# Patient Record
Sex: FEMALE | Race: WHITE | Employment: UNEMPLOYED | URBAN - METROPOLITAN AREA
[De-identification: names, ages, dates, MRNs, and addresses within clinical notes are randomized per-mention and may not be internally consistent; named-entity substitution may affect disease eponyms.]

---

## 2017-10-17 ENCOUNTER — ED HISTORICAL/CONVERTED ENCOUNTER (OUTPATIENT)
Dept: OTHER | Age: 42
End: 2017-10-17

## 2017-12-13 ENCOUNTER — ED HISTORICAL/CONVERTED ENCOUNTER (OUTPATIENT)
Dept: OTHER | Age: 42
End: 2017-12-13

## 2019-09-30 ENCOUNTER — ED HISTORICAL/CONVERTED ENCOUNTER (OUTPATIENT)
Dept: OTHER | Age: 44
End: 2019-09-30

## 2020-03-10 ENCOUNTER — ED HISTORICAL/CONVERTED ENCOUNTER (OUTPATIENT)
Dept: OTHER | Age: 45
End: 2020-03-10

## 2020-07-30 PROBLEM — R92.8 ABNORMAL MAMMOGRAM: Status: ACTIVE | Noted: 2020-07-30

## 2020-07-30 RX ORDER — SUMATRIPTAN 50 MG/1
50 TABLET, FILM COATED ORAL
COMMUNITY

## 2020-07-30 RX ORDER — ALPRAZOLAM 1 MG/1
0.5 TABLET ORAL
COMMUNITY

## 2020-07-30 RX ORDER — NAPROXEN 500 MG/1
500 TABLET ORAL 2 TIMES DAILY WITH MEALS
COMMUNITY
End: 2020-10-10

## 2020-07-31 ENCOUNTER — OFFICE VISIT (OUTPATIENT)
Dept: SURGERY | Age: 45
End: 2020-07-31
Payer: COMMERCIAL

## 2020-07-31 VITALS
SYSTOLIC BLOOD PRESSURE: 120 MMHG | TEMPERATURE: 98.4 F | HEART RATE: 59 BPM | DIASTOLIC BLOOD PRESSURE: 72 MMHG | BODY MASS INDEX: 26.97 KG/M2 | HEIGHT: 67 IN | WEIGHT: 171.8 LBS

## 2020-07-31 DIAGNOSIS — R92.8 ABNORMAL MAMMOGRAPHY: ICD-10-CM

## 2020-07-31 DIAGNOSIS — Z80.3 FAMILY HISTORY OF BREAST CANCER: ICD-10-CM

## 2020-07-31 DIAGNOSIS — F17.200 TOBACCO DEPENDENCE: ICD-10-CM

## 2020-07-31 DIAGNOSIS — N64.4 BREAST PAIN: Primary | ICD-10-CM

## 2020-07-31 PROCEDURE — 99204 OFFICE O/P NEW MOD 45 MIN: CPT | Performed by: SURGERY

## 2020-07-31 RX ORDER — IBUPROFEN 200 MG
1 TABLET ORAL EVERY 24 HOURS
Qty: 30 PATCH | Refills: 0 | Status: SHIPPED | OUTPATIENT
Start: 2020-07-31 | End: 2020-08-30

## 2020-07-31 RX ORDER — PREGABALIN 75 MG/1
CAPSULE ORAL
COMMUNITY
Start: 2020-06-18 | End: 2022-01-06

## 2020-07-31 RX ORDER — DICLOFENAC SODIUM 10 MG/G
GEL TOPICAL 4 TIMES DAILY
Qty: 50 G | Refills: 1 | Status: SHIPPED | OUTPATIENT
Start: 2020-07-31 | End: 2020-10-10

## 2020-07-31 NOTE — PROGRESS NOTES
1. Have you been to the ER, urgent care clinic since your last visit? Hospitalized since your last visit? No    2. Have you seen or consulted any other health care providers outside of the [address] [place] [address] since your last visit? Include any pap smears or colon screening. No       Patient is here for 2nd opinion for abnormal mammo. Patient has no other complaints.

## 2020-08-02 NOTE — PROGRESS NOTES
This is the first [address] [place] [address] visit for this 45y.o.-year-old woman who presents with left abnormal mammogram      HISTORY OF PRESENT ILLNESS: Ms Cayetano Goldman is a 45y.o.-year-old woman who presents for left abnormal mammogra. She denies any palpable masses, nipple discharge, skin changes, or axillary adenopathy. She denies breast pain. She does not have any significant past history for breast diseases or breast biopsy. Breast cancer risk factors:  Menarche at age 19    Age at first live birth: 27  premenopausal.  OCP use: admits to  HRT use: denies  Infertility treatment:  denies    FAMILY HISTORY:  Maternal aunt w breast cancer at 52yo  Maternal great aunt w breast cancer at 70yo  Maternal grandfather w stomach cancer at 68 (no relation to the great aunt)    Past Medical History:   Diagnosis Date    Anxiety     Diabetes ([address]     gestational    Fibromyalgia     Headache     migraines         Past Surgical History:   Procedure Laterality Date    HX CHOLECYSTECTOMY      HX GYN      hysterectomy    HX ORTHOPAEDIC      toe    HX TUBAL LIGATION           Social History     Socioeconomic History    Marital status: UNKNOWN     Spouse name: Not on file    Number of children: Not on file    Years of education: Not on file    Highest education level: Not on file   Tobacco Use    Smoking status: Current Every Day Smoker     Packs/day: 0.50     Years: 10.00     Pack years: 5.00    Smokeless tobacco: Never Used   Substance and Sexual Activity    Alcohol use: Yes     Comment: Rarely    Drug use: Not Currently         Allergies   Allergen Reactions    Sulfa (Sulfonamide Antibiotics) Unable to Obtain    Vancomycin Other (comments)     Red streaks at injection site.      Zoloft [Sertraline] Unable to Obtain         Current Outpatient Medications   Medication Sig Dispense Refill    pregabalin (LYRICA) 75 mg capsule TK 1 C PO BID      diclofenac (VOLTAREN) 1 % gel Apply to affected area four (4) times daily. 50 g 1    nicotine (NICODERM CQ) 14 mg/24 hr patch 1 Patch by TransDERmal route every twenty-four (24) hours for 30 days. 30 Patch 0    naproxen (NAPROSYN) 500 mg tablet Take 500 mg by mouth two (2) times daily (with meals).  fexofenadine HCl (ALLEGRA PO) Take  by mouth.  SUMAtriptan (IMITREX) 50 mg tablet Take 50 mg by mouth once as needed for Migraine.  ALPRAZolam (Xanax) 1 mg tablet Take  by mouth. REVIEW OF SYSTEMS:  General: normal, no unintentional weight loss  HEENT: normal, no lymphadenopathy  Cardiovascular: normal, no chest pain  Respiratory: no cough, shortness of breath, or wheezing  BREAST: no breast complaints  GI: normal, no blood in urine or stool  : normal, no hematuria  Musculoskeletal: no back pain  Integumentary: no abnormal skin lesions  Neuro: no memory changes, dizziness, loss of consciousness  Psych: no mood disorders, feels safe in a relationship      PHYSICAL EXAM:  Patient is a 45y.o.-year-old woman  General Appearance: healthy-appearing, no acute distress, ambulating normally  Head: normocephalic  Neck: supple and no masses  Lymph Nodes: no cervical LAD, supraclavicular LAD, or axillary LAD    BREASTS: symmetric  RIGHT BREAST: no erythema, induration, tenderness, ecchymosis, skin changes, abnormal secretions, or axillary lymphadenopathy and normal nipple appearance, no masses  LEFT BREAST: no erythema, induration, tenderness, ecchymosis, skin changes, abnormal secretions, or axillary lymphadenopathy and normal nipple appearance, no masses    Lungs: no dyspnea  Back: normal curvature  Abdomen: soft and no tenderness, no hepatomegaly, no splenomegaly  Skin: no jaundice  Musculoskeletal: Extremities w no edema, normal motor strength, normal movement of all extremities  Neurologic: Cranial Nerves: grossly intact.  Sensation: grossly intact  Psychiatric: good judgement and insight, active and alert and normal mood      RADIOLOGIC WORK-UP: Radiology films and reports were reviewed. 7.13.2020 left breast US and dx mmg: probably benign [id number] mass at the 3:00 position 8cm FN. Likely fibroadenoma. I performed an ultrasound in the office that demonstrated the left breast mass without suspicious characteristics      IMPRESSION:  45y.o.-year old woman with left abnormal mammogram, likely fibroadenoma    DISCUSSION AND PLAN:  I discussed the results of the physical examination and imaging with Ms. Criss Newsome and explained that the findings are probably benign. We would recommend a short term imaging and physical examination follow up to demonstrate stability of the findings. No other additional diagnostic or therapeutic intervention is indicated at this time. I will place an order for her 3-month follow-up imaging and she will return to clinic for an exam after her imaging is performed. I asked Ms Criss Newsome about tobacco use: she is a current smoker; I explained and advised that she quit; she is willing to quit. Patient will continue to attempt to quit. Advised her to use nicotine patches. The patient was counseled on continued smoking cessation. We will arrange follow-up of her tobacco us. I advised her to return to clinic earlier if she notes a change in her exam or she has a new finding. This encounter lasted 45 minutes and > 50% was devoted to a discussion of management options.

## 2020-08-17 ENCOUNTER — ED HISTORICAL/CONVERTED ENCOUNTER (OUTPATIENT)
Dept: OTHER | Age: 45
End: 2020-08-17

## 2020-10-05 ENCOUNTER — HOSPITAL ENCOUNTER (OUTPATIENT)
Dept: NON INVASIVE DIAGNOSTICS | Age: 45
Discharge: HOME | End: 2020-10-05
Attending: PSYCHIATRY & NEUROLOGY
Payer: COMMERCIAL

## 2020-10-05 VITALS
HEIGHT: 67 IN | BODY MASS INDEX: 26.68 KG/M2 | SYSTOLIC BLOOD PRESSURE: 122 MMHG | WEIGHT: 170 LBS | DIASTOLIC BLOOD PRESSURE: 71 MMHG

## 2020-10-05 DIAGNOSIS — R42 DIZZINESS: ICD-10-CM

## 2020-10-05 PROCEDURE — 93880 EXTRACRANIAL BILAT STUDY: CPT

## 2020-10-05 PROCEDURE — 93306 TTE W/DOPPLER COMPLETE: CPT

## 2020-10-06 LAB
ECHO AO ROOT DIAM: 3.1 CM
ECHO AV AREA PEAK VELOCITY: 3.67 CM2
ECHO AV AREA/BSA PEAK VELOCITY: 1.9 CM2/M2
ECHO AV PEAK GRADIENT: 5 MMHG
ECHO EST RA PRESSURE: 3 MMHG
ECHO LV E' SEPTAL VELOCITY: 12.4 CM/S
ECHO LV EJECTION FRACTION A2C: 35 %
ECHO LV EJECTION FRACTION BIPLANE: 63.9 % (ref 55–100)
ECHO LV EJECTION FRACTION BIPLANE: 71.1 % (ref 55–100)
ECHO LV INTERNAL DIMENSION DIASTOLIC: 4.24 CM (ref 3.9–5.3)
ECHO LV INTERNAL DIMENSION SYSTOLIC: 2.8 CM
ECHO LV IVSD: 0.85 CM (ref 0.6–0.9)
ECHO LV POSTERIOR WALL DIASTOLIC: 0.88 CM (ref 0.6–0.9)
ECHO LV POSTERIOR WALL DIASTOLIC: 0.89 CM (ref 0.6–0.9)
ECHO LVOT DIAM: 2 CM
ECHO LVOT PEAK GRADIENT: 7 MMHG
ECHO MV A VELOCITY: 40.7 CM/S
ECHO MV AREA PHT: 1.45 CM2
ECHO MV E DECELERATION TIME (DT): 0.23 S
ECHO MV E VELOCITY: 64.2 CM/S
ECHO MV E/A RATIO: 1.58
ECHO MV E/E' SEPTAL: 5.18
ECHO MV PRESSURE HALF TIME (PHT): 0.15 S
ECHO PV REGURGITANT MAX VELOCITY: 112 CM/S
ECHO PV REGURGITANT MAX VELOCITY: 131 CM/S
ECHO PVEIN A DURATION: 0.07 S
ECHO PVEIN A VELOCITY: 22.5 CM/S
ECHO RIGHT VENTRICULAR SYSTOLIC PRESSURE (RVSP): 16 MMHG
ECHO RV INTERNAL DIMENSION: 2.24 CM
ECHO TV MAX VELOCITY: 179 CM/S
ECHO TV REGURGITANT PEAK GRADIENT: 13 MMHG
LEFT CCA DIST DIAS: 33.6 CM/S
LEFT CCA DIST SYS: 117 CM/S
LEFT CCA PROX DIAS: 22.2 CM/S
LEFT CCA PROX SYS: 105 CM/S
LEFT ECA DIAS: 13 CM/S
LEFT ECA SYS: 108 CM/S
LEFT ICA DIST DIAS: 37.4 CM/S
LEFT ICA DIST SYS: 93.2 CM/S
LEFT ICA PROX DIAS: 35.1 CM/S
LEFT ICA PROX SYS: 96.2 CM/S
LEFT SUBCLAVIAN DIAS: 37.4 CM/S
LEFT SUBCLAVIAN SYS: 134 CM/S
LEFT VERTEBRAL DIAS: 19.1 CM/S
LEFT VERTEBRAL SYS: 67.2 CM/S
RIGHT CCA DIST DIAS: 27.5 CM/S
RIGHT CCA DIST SYS: 112 CM/S
RIGHT CCA PROX DIAS: 28.4 CM/S
RIGHT CCA PROX SYS: 123 CM/S
RIGHT ECA DIAS: 15.5 CM/S
RIGHT ECA SYS: 102 CM/S
RIGHT ICA DIST DIAS: 35.9 CM/S
RIGHT ICA DIST SYS: 109 CM/S
RIGHT ICA PROX DIAS: 35.1 CM/S
RIGHT ICA PROX SYS: 99.3 CM/S
RIGHT SUBCLAVIAN DIAS: 0 CM/S
RIGHT SUBCLAVIAN SYS: 112 CM/S
RIGHT VERTEBRAL DIAS: 10.7 CM/S
RIGHT VERTEBRAL SYS: 45.7 CM/S

## 2020-10-10 ENCOUNTER — HOSPITAL ENCOUNTER (EMERGENCY)
Age: 45
Discharge: HOME | End: 2020-10-10
Attending: EMERGENCY MEDICINE
Payer: COMMERCIAL

## 2020-10-10 ENCOUNTER — APPOINTMENT (OUTPATIENT)
Dept: GENERAL RADIOLOGY | Age: 45
End: 2020-10-10
Attending: EMERGENCY MEDICINE
Payer: COMMERCIAL

## 2020-10-10 VITALS
RESPIRATION RATE: 18 BRPM | WEIGHT: 165 LBS | HEART RATE: 60 BPM | DIASTOLIC BLOOD PRESSURE: 86 MMHG | TEMPERATURE: 98.3 F | SYSTOLIC BLOOD PRESSURE: 129 MMHG | HEIGHT: 67 IN | OXYGEN SATURATION: 100 % | BODY MASS INDEX: 25.9 KG/M2

## 2020-10-10 DIAGNOSIS — S93.401A SPRAIN OF RIGHT ANKLE, UNSPECIFIED LIGAMENT, INITIAL ENCOUNTER: Primary | ICD-10-CM

## 2020-10-10 PROCEDURE — 99283 EMERGENCY DEPT VISIT LOW MDM: CPT

## 2020-10-10 PROCEDURE — 73610 X-RAY EXAM OF ANKLE: CPT

## 2020-10-10 RX ORDER — IBUPROFEN 400 MG/1
400 TABLET ORAL
Qty: 20 TAB | Refills: 0 | Status: SHIPPED | OUTPATIENT
Start: 2020-10-10 | End: 2022-01-06

## 2020-10-10 NOTE — ED TRIAGE NOTES
Walking and turned left ankle out and fell, no other injury  Pt walked to triage limping, states she can move but hurts tender to lateral posterior side.  PMS good,

## 2020-10-10 NOTE — ED PROVIDER NOTES
EMERGENCY DEPARTMENT HISTORY AND PHYSICAL EXAM      Date: 10/10/2020  Patient Name: Cynthia Crenshaw    History of Presenting Illness     Chief Complaint   Patient presents with    Ankle Pain       History Provided By: Patient    HPI: Cynthia Crenshaw, 45 y.o. female with a past medical history significant Anxiety and DMpresents to the ED with cc of rolled right ankle yesterday    There are no other complaints, changes, or physical findings at this time. PCP: Soumya Woods, DO    No current facility-administered medications on file prior to encounter. Current Outpatient Medications on File Prior to Encounter   Medication Sig Dispense Refill    pregabalin (LYRICA) 75 mg capsule TK 1 C PO BID      [DISCONTINUED] diclofenac (VOLTAREN) 1 % gel Apply  to affected area four (4) times daily. 50 g 1    fexofenadine HCl (ALLEGRA PO) Take  by mouth.  SUMAtriptan (IMITREX) 50 mg tablet Take 50 mg by mouth once as needed for Migraine.  ALPRAZolam (Xanax) 1 mg tablet Take  by mouth.  [DISCONTINUED] naproxen (NAPROSYN) 500 mg tablet Take 500 mg by mouth two (2) times daily (with meals).          Past History     Past Medical History:  Past Medical History:   Diagnosis Date    Anxiety     Diabetes ([address]     gestational    Fibromyalgia     Headache     migraines       Past Surgical History:  Past Surgical History:   Procedure Laterality Date    HX CHOLECYSTECTOMY      HX GYN      hysterectomy    HX ORTHOPAEDIC      toe    HX TUBAL LIGATION         Family History:  Family History   Problem Relation Age of Onset    Lupus Mother     Lupus Sister     Coronary Artery Disease Other     Heart Disease Other         MI    Diabetes Other     Lung Cancer Other     Breast Cancer Maternal Aunt     Stomach Cancer Maternal Grandfather     Prostate Cancer Paternal Grandfather        Social History:  Social History     Tobacco Use    Smoking status: Current Every Day Smoker     Packs/day: 0.25     Years: 10.00     Pack years: 2.50    Smokeless tobacco: Never Used   Substance Use Topics    Alcohol use: Yes     Comment: Rarely    Drug use: Never       Allergies: Allergies   Allergen Reactions    Sulfa (Sulfonamide Antibiotics) Unable to Obtain    Vancomycin Other (comments)     Red streaks at injection site.  Zoloft [Sertraline] Unable to Obtain         Review of Systems     Review of Systems   Constitutional: Negative. HENT: Negative. Eyes: Negative. Respiratory: Negative. Cardiovascular: Negative. Gastrointestinal: Negative. Endocrine: Negative. Genitourinary: Negative. Musculoskeletal: Positive for joint swelling. Twisted right ankle. Painful and swollen   Neurological: Negative. Hematological: Negative. All other systems reviewed and are negative. Physical Exam     Physical Exam  Vitals signs and nursing note reviewed. Constitutional:       General: She is not in acute distress. Appearance: Normal appearance. HENT:      Head: Normocephalic and atraumatic. Neck:      Musculoskeletal: Normal range of motion and neck supple. Cardiovascular:      Rate and Rhythm: Regular rhythm. Pulses: Normal pulses. Heart sounds: Normal heart sounds. Pulmonary:      Effort: Pulmonary effort is normal.      Breath sounds: Normal breath sounds. Musculoskeletal:         General: Swelling and tenderness present. Comments: Right lateral malleolus   Neurological:      General: No focal deficit present. Mental Status: She is alert and oriented to person, place, and time. Diagnostic Study Results     Labs -   No results found for this or any previous visit (from the past 12 hour(s)). Radiologic Studies -   @lastxrresult@  CT Results  (Last 48 hours)    None        CXR Results  (Last 48 hours)    None            Medical Decision Making   I am the first provider for this patient.     I reviewed the vital signs, available nursing notes, past medical history, past surgical history, family history and social history. Vital Signs-Reviewed the patient's vital signs. Patient Vitals for the past 12 hrs:   Temp Pulse Resp BP SpO2   10/10/20 1111 98.3 °F (36.8 °C) 60 18 129/86 100 %       Records Reviewed:           Provider Notes (Medical Decision Making):     MDM  Number of Diagnoses or Management Options  Sprain of right ankle, unspecified ligament, initial encounter:      Amount and/or Complexity of Data Reviewed  Tests in the radiology section of CPT®: reviewed             ED Course:   Initial assessment performed. The patients presenting problems have been discussed, and they are in agreement with the care plan formulated and outlined with them. I have encouraged them to ask questions as they arise throughout their visit. PLAN:  1. Current Discharge Medication List      START taking these medications    Details   ibuprofen (MOTRIN) 400 mg tablet Take 1 Tab by mouth every six (6) hours as needed for Pain. Qty: 20 Tab, Refills: 0           2. Follow-up Information     Follow up With Specialties Details Why [address] [facility], [facility], [address] In 3 days  [address] [facility] [address]  [phone]          Return to ED if worse     Diagnosis     Clinical Impression:   1.  Sprain of right ankle, unspecified ligament, initial encounter

## 2021-06-28 ENCOUNTER — HOSPITAL ENCOUNTER (EMERGENCY)
Age: 46
Discharge: HOME | End: 2021-06-28
Attending: EMERGENCY MEDICINE
Payer: COMMERCIAL

## 2021-06-28 ENCOUNTER — APPOINTMENT (OUTPATIENT)
Dept: CT IMAGING | Age: 46
End: 2021-06-28
Attending: EMERGENCY MEDICINE
Payer: COMMERCIAL

## 2021-06-28 VITALS
DIASTOLIC BLOOD PRESSURE: 82 MMHG | RESPIRATION RATE: 18 BRPM | HEIGHT: 67 IN | HEART RATE: 73 BPM | SYSTOLIC BLOOD PRESSURE: 124 MMHG | BODY MASS INDEX: 27.15 KG/M2 | TEMPERATURE: 99.3 F | WEIGHT: 173 LBS | OXYGEN SATURATION: 97 %

## 2021-06-28 DIAGNOSIS — R10.84 ABDOMINAL PAIN, GENERALIZED: Primary | ICD-10-CM

## 2021-06-28 LAB
ALBUMIN SERPL-MCNC: 3.6 G/DL (ref 3.5–5)
ALBUMIN/GLOB SERPL: 0.9 {RATIO} (ref 1.1–2.2)
ALP SERPL-CCNC: 80 U/L (ref 45–117)
ALT SERPL-CCNC: 36 U/L (ref 12–78)
ANION GAP SERPL CALC-SCNC: 5 MMOL/L (ref 5–15)
APPEARANCE UR: CLEAR
AST SERPL W P-5'-P-CCNC: 22 U/L (ref 15–37)
BACTERIA URNS QL MICRO: NEGATIVE /HPF
BASOPHILS # BLD: 0.1 K/UL (ref 0–0.1)
BASOPHILS NFR BLD: 0 % (ref 0–1)
BILIRUB SERPL-MCNC: 0.2 MG/DL (ref 0.2–1)
BILIRUB UR QL: NEGATIVE
BUN SERPL-MCNC: 11 MG/DL (ref 6–20)
BUN/CREAT SERPL: 15 (ref 12–20)
CA-I BLD-MCNC: 9.1 MG/DL (ref 8.5–10.1)
CHLORIDE SERPL-SCNC: 108 MMOL/L (ref 97–108)
CO2 SERPL-SCNC: 26 MMOL/L (ref 21–32)
COLOR UR: NORMAL
CREAT SERPL-MCNC: 0.75 MG/DL (ref 0.55–1.02)
DIFFERENTIAL METHOD BLD: ABNORMAL
EOSINOPHIL # BLD: 0.4 K/UL (ref 0–0.4)
EOSINOPHIL NFR BLD: 3 % (ref 0–7)
ERYTHROCYTE [DISTWIDTH] IN BLOOD BY AUTOMATED COUNT: 12.9 % (ref 11.5–14.5)
GLOBULIN SER CALC-MCNC: 3.8 G/DL (ref 2–4)
GLUCOSE SERPL-MCNC: 134 MG/DL (ref 65–100)
GLUCOSE UR STRIP.AUTO-MCNC: NEGATIVE MG/DL
HCG SERPL QL: NEGATIVE
HCT VFR BLD AUTO: 44 % (ref 35–47)
HGB BLD-MCNC: 14.7 G/DL (ref 11.5–16)
HGB UR QL STRIP: NEGATIVE
IMM GRANULOCYTES # BLD AUTO: 0 K/UL (ref 0–0.04)
IMM GRANULOCYTES NFR BLD AUTO: 0 % (ref 0–0.5)
KETONES UR QL STRIP.AUTO: NEGATIVE MG/DL
LEUKOCYTE ESTERASE UR QL STRIP.AUTO: NEGATIVE
LIPASE SERPL-CCNC: 168 U/L (ref 73–393)
LYMPHOCYTES # BLD: 3.6 K/UL (ref 0.8–3.5)
LYMPHOCYTES NFR BLD: 27 % (ref 12–49)
MCH RBC QN AUTO: 30.3 PG (ref 26–34)
MCHC RBC AUTO-ENTMCNC: 33.4 G/DL (ref 30–36.5)
MCV RBC AUTO: 90.7 FL (ref 80–99)
MONOCYTES # BLD: 0.8 K/UL (ref 0–1)
MONOCYTES NFR BLD: 6 % (ref 5–13)
NEUTS SEG # BLD: 8.7 K/UL (ref 1.8–8)
NEUTS SEG NFR BLD: 64 % (ref 32–75)
NITRITE UR QL STRIP.AUTO: NEGATIVE
NRBC # BLD: 0 K/UL (ref 0–0.01)
NRBC BLD-RTO: 0 PER 100 WBC
PH UR STRIP: 6 [PH] (ref 5–8)
PLATELET # BLD AUTO: 286 K/UL (ref 150–400)
PMV BLD AUTO: 12.2 FL (ref 8.9–12.9)
POTASSIUM SERPL-SCNC: 3.7 MMOL/L (ref 3.5–5.1)
PROT SERPL-MCNC: 7.4 G/DL (ref 6.4–8.2)
PROT UR STRIP-MCNC: NEGATIVE MG/DL
RBC # BLD AUTO: 4.85 M/UL (ref 3.8–5.2)
RBC #/AREA URNS HPF: NORMAL /HPF (ref 0–5)
SODIUM SERPL-SCNC: 139 MMOL/L (ref 136–145)
SP GR UR REFRACTOMETRY: 1.01 (ref 1–1.03)
UA: UC IF INDICATED,UAUC: NORMAL
UROBILINOGEN UR QL STRIP.AUTO: 0.1 EU/DL (ref 0.1–1)
WBC # BLD AUTO: 13.5 K/UL (ref 3.6–11)
WBC URNS QL MICRO: NORMAL /HPF (ref 0–4)

## 2021-06-28 PROCEDURE — 83690 ASSAY OF LIPASE: CPT

## 2021-06-28 PROCEDURE — 99283 EMERGENCY DEPT VISIT LOW MDM: CPT

## 2021-06-28 PROCEDURE — 84703 CHORIONIC GONADOTROPIN ASSAY: CPT

## 2021-06-28 PROCEDURE — 80053 COMPREHEN METABOLIC PANEL: CPT

## 2021-06-28 PROCEDURE — 93005 ELECTROCARDIOGRAM TRACING: CPT

## 2021-06-28 PROCEDURE — 74011000636 HC RX REV CODE- 636: Performed by: EMERGENCY MEDICINE

## 2021-06-28 PROCEDURE — 81001 URINALYSIS AUTO W/SCOPE: CPT

## 2021-06-28 PROCEDURE — 36415 COLL VENOUS BLD VENIPUNCTURE: CPT

## 2021-06-28 PROCEDURE — 85025 COMPLETE CBC W/AUTO DIFF WBC: CPT

## 2021-06-28 PROCEDURE — 74177 CT ABD & PELVIS W/CONTRAST: CPT

## 2021-06-28 RX ORDER — ALUMINA, MAGNESIA, AND SIMETHICONE 2400; 2400; 240 MG/30ML; MG/30ML; MG/30ML
10 SUSPENSION ORAL
Qty: 335 ML | Refills: 0 | Status: SHIPPED | OUTPATIENT
Start: 2021-06-28 | End: 2022-01-06

## 2021-06-28 RX ADMIN — IOPAMIDOL 100 ML: 755 INJECTION, SOLUTION INTRAVENOUS at 19:22

## 2021-06-28 NOTE — ED PROVIDER NOTES
EMERGENCY DEPARTMENT HISTORY AND PHYSICAL EXAM      Date: 6/28/2021  Patient Name: Yoshi Almanzar    History of Presenting Illness     Chief Complaint   Patient presents with    Nausea    Abdominal Pain       History Provided By: Patient    HPI: Yoshi Almanzar, 39 y.o. female with PMHx as noted below presents the emergency department chief complaint abdominal pain, nausea vomiting. Patient stated onset of pain was earlier today. Patient states originally pain was in her epigastrium and describes it as a dull pain. She states the pain is radiating to her right lower quadrant and right upper quadrant. Pain is moderate in intensity and she does have some associated nausea. Pt denies any other alleviating or exacerbating factors. Additionally, pt specifically denies any recent fever, chills, headache, vomiting,CP, SOB, lightheadedness, dizziness, numbness, weakness, BLE swelling, heart palpitations, urinary sxs, diarrhea, constipation, melena, hematochezia, cough, or congestion. PCP: Gaetano Sofia, DO    Current Outpatient Medications   Medication Sig Dispense Refill    aluminum & magnesium hydroxide-simethicone (Maalox Maximum Strength) 400-400-40 mg/5 mL suspension Take 10 mL by mouth every six (6) hours as needed for Indigestion. 335 mL 0    ibuprofen (MOTRIN) 400 mg tablet Take 1 Tab by mouth every six (6) hours as needed for Pain. 20 Tab 0    pregabalin (LYRICA) 75 mg capsule TK 1 C PO BID      fexofenadine HCl (ALLEGRA PO) Take  by mouth.  SUMAtriptan (IMITREX) 50 mg tablet Take 50 mg by mouth once as needed for Migraine.  ALPRAZolam (Xanax) 1 mg tablet Take  by mouth.          Past History     Past Medical History:  Past Medical History:   Diagnosis Date    Anxiety     Diabetes ([facility] [address]     gestational    Fibromyalgia     Headache     migraines       Past Surgical History:  Past Surgical History:   Procedure Laterality Date    HX CHOLECYSTECTOMY      HX GYN      hysterectomy    HX ORTHOPAEDIC      toe    HX TUBAL LIGATION         Family History:  Family History   Problem Relation Age of Onset    Lupus Mother     Lupus Sister     Coronary Artery Disease Other     Heart Disease Other         MI    Diabetes Other     Lung Cancer Other     Breast Cancer Maternal Aunt     Stomach Cancer Maternal Grandfather     Prostate Cancer Paternal Grandfather        Social History:  Social History     Tobacco Use    Smoking status: Current Every Day Smoker     Packs/day: 0.25     Years: 10.00     Pack years: 2.50    Smokeless tobacco: Never Used   Substance Use Topics    Alcohol use: Yes     Comment: Rarely    Drug use: Never       Allergies: Allergies   Allergen Reactions    Sulfa (Sulfonamide Antibiotics) Unable to Obtain    Vancomycin Other (comments)     Red streaks at injection site.  Zoloft [Sertraline] Unable to Obtain         Review of Systems   Review of Systems  Constitutional: Negative for fever, chills, and fatigue. HENT: Negative for congestion, sore throat, rhinorrhea, sneezing and neck stiffness   Eyes: Negative for discharge and redness. Respiratory: Negative for  shortness of breath, wheezing   Cardiovascular: Negative for chest pain, palpitations   Gastrointestinal: Positive for nausea,  abdominal pain. Negative vomiting constipation, diarrhea and blood in stool. Genitourinary: Negative for dysuria, hematuria, flank pain, decreased urine volume, discharge,   Musculoskeletal: Negative for myalgias or joint pain . Skin: Negative for rash or lesions . Neurological: Negative weakness, light-headedness, numbness and headaches. Physical Exam   Physical Exam    GENERAL: alert and oriented, no acute distress  EYES: PEERL, No injection, discharge or icterus. ENT: Mucous membranes pink and moist.  NECK: Supple  LUNGS: Airway patent. Non-labored respirations. Breath sounds clear with good air entry bilaterally. HEART: Regular rate and rhythm.  No peripheral edema  ABDOMEN: Non-distended, mild diffusely tender, worse in the epigastrium and right lower quadrant without guarding or rebound. SKIN:  warm, dry  MSK/EXTREMITIES: Without swelling, tenderness or deformity, symmetric with normal ROM  NEUROLOGICAL: Alert, oriented      Diagnostic Study Results     Labs -     Recent Results (from the past 12 hour(s))   CBC WITH AUTOMATED DIFF    Collection Time: 06/28/21  5:45 PM   Result Value Ref Range    WBC 13.5 (H) 3.6 - 11.0 K/uL    RBC 4.85 3.80 - 5.20 M/uL    HGB 14.7 11.5 - 16.0 g/dL    HCT 44.0 35.0 - 47.0 %    MCV 90.7 80.0 - 99.0 FL    MCH 30.3 26.0 - 34.0 PG    MCHC 33.4 30.0 - 36.5 g/dL    RDW 12.9 11.5 - 14.5 %    PLATELET 599 080 - 343 K/uL    MPV 12.2 8.9 - 12.9 FL    NRBC 0.0 0.0  WBC    ABSOLUTE NRBC 0.00 0.00 - 0.01 K/uL    NEUTROPHILS 64 32 - 75 %    LYMPHOCYTES 27 12 - 49 %    MONOCYTES 6 5 - 13 %    EOSINOPHILS 3 0 - 7 %    BASOPHILS 0 0 - 1 %    IMMATURE GRANULOCYTES 0 0 - 0.5 %    ABS. NEUTROPHILS 8.7 (H) 1.8 - 8.0 K/UL    ABS. LYMPHOCYTES 3.6 (H) 0.8 - 3.5 K/UL    ABS. MONOCYTES 0.8 0.0 - 1.0 K/UL    ABS. EOSINOPHILS 0.4 0.0 - 0.4 K/UL    ABS. BASOPHILS 0.1 0.0 - 0.1 K/UL    ABS. IMM. GRANS. 0.0 0.00 - 0.04 K/UL    DF AUTOMATED     METABOLIC PANEL, COMPREHENSIVE    Collection Time: 06/28/21  5:45 PM   Result Value Ref Range    Sodium 139 136 - 145 mmol/L    Potassium 3.7 3.5 - 5.1 mmol/L    Chloride 108 97 - 108 mmol/L    CO2 26 21 - 32 mmol/L    Anion gap 5 5 - 15 mmol/L    Glucose 134 (H) 65 - 100 mg/dL    BUN 11 6 - 20 mg/dL    Creatinine 0.75 0.55 - 1.02 mg/dL    BUN/Creatinine ratio 15 12 - 20      GFR est AA >60 >60 ml/min/1.73m2    GFR est non-AA >60 >60 ml/min/1.73m2    Calcium 9.1 8.5 - 10.1 mg/dL    Bilirubin, total 0.2 0.2 - 1.0 mg/dL    AST (SGOT) 22 15 - 37 U/L    ALT (SGPT) 36 12 - 78 U/L    Alk.  phosphatase 80 45 - 117 U/L    Protein, total 7.4 6.4 - 8.2 g/dL    Albumin 3.6 3.5 - 5.0 g/dL    Globulin 3.8 2.0 - 4.0 g/dL    A-G Ratio 0.9 (L) 1.1 - 2.2     URINALYSIS W/ REFLEX CULTURE    Collection Time: 06/28/21  5:45 PM    Specimen: Urine   Result Value Ref Range    Color Yellow/Straw      Appearance Clear Clear      Specific gravity 1.014 1.003 - 1.030      pH (UA) 6.0 5.0 - 8.0      Protein Negative Negative mg/dL    Glucose Negative Negative mg/dL    Ketone Negative Negative mg/dL    Bilirubin Negative Negative      Blood Negative Negative      Urobilinogen 0.1 0.1 - 1.0 EU/dL    Nitrites Negative Negative      Leukocyte Esterase Negative Negative      UA:UC IF INDICATED Culture not indicated by UA result Culture not indicated by UA result      WBC 0-4 0 - 4 /hpf    RBC 0-5 0 - 5 /hpf    Bacteria Negative Negative /hpf   HCG QL SERUM    Collection Time: 06/28/21  5:45 PM   Result Value Ref Range    HCG, Ql. Negative Negative     LIPASE    Collection Time: 06/28/21  5:45 PM   Result Value Ref Range    Lipase 168 73 - 393 U/L       Radiologic Studies -   CT ABD PELV W CONT   Final Result      No acute process. Cholecystectomy. Hysterectomy. Small left ovarian cyst, within physiologic   limits. Mild diverticulosis. No diverticulitis. CT Results  (Last 48 hours)               06/28/21 1921  CT ABD PELV W CONT Final result    Impression:      No acute process. Cholecystectomy. Hysterectomy. Small left ovarian cyst, within physiologic   limits. Mild diverticulosis. No diverticulitis. Narrative:  Technique:        Axial images with multiplanar reformats of abdomen and pelvis with 100cc Isoview   370   intravenous contrast injection. Oral contrast is not used for this study. Dose Reduction:  All CT scans at this facility are performed using dose   reduction optimization techniques as appropriate to a performed exam including   the following: Automated exposure control, adjustments of the mA and/or kV   according to patient size, or use of iterative reconstruction technique. Comparison:  No priors available. Findings:        Clear lung bases. There is a small calcification involving hepatic dome, most   likely representing small calcified granuloma. Cholecystectomy. Bile ducts are   not dilated. No suspicious liver lesion. Unremarkable spleen, pancreas,   bilateral adrenal glands. Unremarkable bilateral kidneys. No hydronephrosis. No   definite renal or ureteral calculus seen. There is mild diverticulosis involving   left colon and sigmoid colon. No diverticulitis. Hysterectomy. Unremarkable   right adnexa. Left adnexal 2 cm cyst present, within physiologic limits. No   definite mass or lymphadenopathy seen. No ascites. There is no free   intraperitoneal air present. No suspicious osseous lesions. CXR Results  (Last 48 hours)    None            Medical Decision Making     IHollis MD am the first provider for this patient and am the attending of record for this patient encounter. I reviewed the vital signs, available nursing notes, past medical history, past surgical history, family history and social history. Vital Signs-Reviewed the patient's vital signs. Patient Vitals for the past 12 hrs:   Temp Pulse Resp BP SpO2   06/28/21 1700 99.3 °F (37.4 °C) 73 18 124/82 97 %         Records Reviewed: Nursing Notes and Old Medical Records    Provider Notes (Medical Decision Making): The patient presents with a chief complaint of abdominal pain. Differential diagnosis considered includes acute appendicitis, acute cholecystitis, pancreatitis, gastritis, PUD, diverticulitis, mesenteric ischemia, abdominal aortic aneurysm, bowel obstruction, enteritis, colitis, fecal impaction, volvulus, IBS, inflammatory bowel disease, specific food intolerance, peritonitis, perforated viscous, malignancy, UTI, abscess, and abdominal pain NOS. Pelvic source of pain was also considered.   All labs and diagnostic studies were reviewed as above and reassuring, nondiagnostic clinical examination, history, and work-up exclude some of the more serious diagnoses as listed above. Cause of the abdominal pain was not clearly identified. Pt is tolerating po. The patient states that their symptoms are relatively mild, was offered pain medication however she declined. There are no other new complaints at this time      There were no concerns for any acute life-threatening or surgical pathology that would warrant admission at this time. Vital signs were within acceptable limits at the time of discharge. Patient was in stable condition and felt to be reliable for outpatient management. There were no lab findings of immediate concern. The patient was advised to return to the emergency room for acutely worsening pain, persistence of pain, fevers, bloody stools, intractable vomiting or bloody emesis, inability to tolerate food/liquids, syncope, or change in mental status. Otherwise, the patient is encouraged to follow-up with a primary care physician within the week if possible. .  The patient understood the work-up and assessment and had no further questions. The patient was discharged home in stable clinical condition. ED Course:   Initial assessment performed. The patients presenting problems have been discussed, and they are in agreement with the care plan formulated and outlined with them. I have encouraged them to ask questions as they arise throughout their visit. PROGRESS  Maribel ENIO Weston's  results have been reviewed with her. She has been counseled regarding her diagnosis. She verbally conveys understanding and agreement of the signs, symptoms, diagnosis, treatment and prognosis and additionally agrees to follow up as recommended with Dr. Cathryn Henderson DO in 24 - 48 hours. She also agrees with the care-plan and conveys that all of her questions have been answered.   I have also put together some discharge instructions for her that include: 1) educational information regarding their diagnosis, 2) how to care for their diagnosis at home, as well a 3) list of reasons why they would want to return to the ED prior to their follow-up appointment, should their condition change. Disposition:  home    PLAN:  1. Discharge Medication List as of 6/28/2021  9:45 PM      START taking these medications    Details   aluminum & magnesium hydroxide-simethicone (Maalox Maximum Strength) 400-400-40 mg/5 mL suspension Take 10 mL by mouth every six (6) hours as needed for Indigestion. , Normal, Disp-335 mL, R-0         CONTINUE these medications which have NOT CHANGED    Details   ibuprofen (MOTRIN) 400 mg tablet Take 1 Tab by mouth every six (6) hours as needed for Pain., Normal, Disp-20 Tab,R-0      pregabalin (LYRICA) 75 mg capsule TK 1 C PO BID, Historical Med      fexofenadine HCl (ALLEGRA PO) Take  by mouth., Historical Med      SUMAtriptan (IMITREX) 50 mg tablet Take 50 mg by mouth once as needed for Migraine., Historical Med      ALPRAZolam (Xanax) 1 mg tablet Take  by mouth., Historical Med           2. Follow-up Information     Follow up With Specialties Details Why [address] [place], [place] [facility] Schedule an appointment as soon as possible for a visit in 2 days  [address]  [facility] [address] [facility] EMERGENCY DEPT Emergency Medicine  If symptoms worsen [address] [id number] [phone]        Return to ED if worse     Diagnosis     Clinical Impression:   1. Abdominal pain, generalized        Please note that this dictation was completed with Dragon, computer voice recognition software. Quite often unanticipated grammatical, syntax, homophones, and other interpretive errors are inadvertently transcribed by the computer software. Please disregard these errors. Additionally, please excuse any errors that have escaped final proofreading.

## 2021-06-28 NOTE — ED NOTES
Bedside shift change [facility]  (oncoming nurse) by Avelina Wang RN  (offgoing nurse). Report included the following information SBAR and ED Summary.

## 2021-06-29 LAB
ATRIAL RATE: 76 BPM
CALCULATED P AXIS, ECG09: 74 DEGREES
CALCULATED R AXIS, ECG10: 81 DEGREES
CALCULATED T AXIS, ECG11: 33 DEGREES
DIAGNOSIS, 93000: NORMAL
P-R INTERVAL, ECG05: 142 MS
Q-T INTERVAL, ECG07: 390 MS
QRS DURATION, ECG06: 118 MS
QTC CALCULATION (BEZET), ECG08: 438 MS
VENTRICULAR RATE, ECG03: 76 BPM

## 2021-10-29 ENCOUNTER — TRANSCRIBE ORDER (OUTPATIENT)
Dept: SCHEDULING | Age: 46
End: 2021-10-29

## 2021-10-29 DIAGNOSIS — R18.8 ASCITIC FLUID: Primary | ICD-10-CM

## 2021-10-29 DIAGNOSIS — R10.9 STOMACH ACHE: ICD-10-CM

## 2021-11-03 ENCOUNTER — HOSPITAL ENCOUNTER (OUTPATIENT)
Dept: CT IMAGING | Age: 46
Discharge: HOME | End: 2021-11-03
Attending: INTERNAL MEDICINE
Payer: COMMERCIAL

## 2021-11-03 DIAGNOSIS — R10.9 STOMACH ACHE: ICD-10-CM

## 2021-11-03 DIAGNOSIS — R18.8 ASCITIC FLUID: ICD-10-CM

## 2021-11-03 PROCEDURE — 74011000636 HC RX REV CODE- 636: Performed by: INTERNAL MEDICINE

## 2021-11-03 PROCEDURE — 74177 CT ABD & PELVIS W/CONTRAST: CPT

## 2021-11-03 RX ADMIN — IOPAMIDOL 100 ML: 755 INJECTION, SOLUTION INTRAVENOUS at 10:33

## 2022-01-06 ENCOUNTER — HOSPITAL ENCOUNTER (OUTPATIENT)
Dept: PREADMISSION TESTING | Age: 47
Discharge: HOME | End: 2022-01-06
Payer: COMMERCIAL

## 2022-01-06 VITALS
RESPIRATION RATE: 16 BRPM | HEART RATE: 69 BPM | WEIGHT: 170.8 LBS | BODY MASS INDEX: 26.81 KG/M2 | OXYGEN SATURATION: 97 % | TEMPERATURE: 98.4 F | HEIGHT: 67 IN | SYSTOLIC BLOOD PRESSURE: 120 MMHG | DIASTOLIC BLOOD PRESSURE: 76 MMHG

## 2022-01-06 LAB
ABO + RH BLD: NORMAL
ANION GAP SERPL CALC-SCNC: 8 MMOL/L (ref 5–15)
BLOOD GROUP ANTIBODIES SERPL: NORMAL
BUN SERPL-MCNC: 6 MG/DL (ref 6–20)
BUN/CREAT SERPL: 9 (ref 12–20)
CALCIUM SERPL-MCNC: 9.4 MG/DL (ref 8.5–10.1)
CHLORIDE SERPL-SCNC: 106 MMOL/L (ref 97–108)
CO2 SERPL-SCNC: 26 MMOL/L (ref 21–32)
CREAT SERPL-MCNC: 0.66 MG/DL (ref 0.55–1.02)
ERYTHROCYTE [DISTWIDTH] IN BLOOD BY AUTOMATED COUNT: 12.9 % (ref 11.5–14.5)
GLUCOSE SERPL-MCNC: 100 MG/DL (ref 65–100)
HCT VFR BLD AUTO: 44.4 % (ref 35–47)
HGB BLD-MCNC: 15 G/DL (ref 11.5–16)
MCH RBC QN AUTO: 30.1 PG (ref 26–34)
MCHC RBC AUTO-ENTMCNC: 33.8 G/DL (ref 30–36.5)
MCV RBC AUTO: 89.2 FL (ref 80–99)
NRBC # BLD: 0 K/UL (ref 0–0.01)
NRBC BLD-RTO: 0 PER 100 WBC
PLATELET # BLD AUTO: 315 K/UL (ref 150–400)
PMV BLD AUTO: 11.2 FL (ref 8.9–12.9)
POTASSIUM SERPL-SCNC: 4.7 MMOL/L (ref 3.5–5.1)
RBC # BLD AUTO: 4.98 M/UL (ref 3.8–5.2)
SODIUM SERPL-SCNC: 140 MMOL/L (ref 136–145)
SPECIMEN EXP DATE BLD: NORMAL
WBC # BLD AUTO: 13.1 K/UL (ref 3.6–11)

## 2022-01-06 PROCEDURE — 93005 ELECTROCARDIOGRAM TRACING: CPT

## 2022-01-06 PROCEDURE — 36415 COLL VENOUS BLD VENIPUNCTURE: CPT

## 2022-01-06 PROCEDURE — 86900 BLOOD TYPING SEROLOGIC ABO: CPT

## 2022-01-06 PROCEDURE — U0005 INFEC AGEN DETEC AMPLI PROBE: HCPCS

## 2022-01-06 PROCEDURE — 80048 BASIC METABOLIC PNL TOTAL CA: CPT

## 2022-01-06 PROCEDURE — 85027 COMPLETE CBC AUTOMATED: CPT

## 2022-01-06 RX ORDER — DULOXETIN HYDROCHLORIDE 60 MG/1
60 CAPSULE, DELAYED RELEASE ORAL
Status: ON HOLD | COMMUNITY
End: 2022-06-17

## 2022-01-06 RX ORDER — METFORMIN HYDROCHLORIDE 500 MG/1
500 TABLET ORAL 2 TIMES DAILY WITH MEALS
COMMUNITY

## 2022-01-06 NOTE — PERIOP NOTES
[address]                   [facility] [place] [facility], [address] [facility]   MAIN OR                                  [phone]   MAIN PRE OP                          [phone]                                                                                AMBULATORY PRE OP          [phone]  PRE-ADMISSION TESTING    [phone]   Surgery Date: Wednesday, January 12, 2022      Is surgery arrival time given by surgeon? NO  If NO, [facility] staff will call you between 3 and 7pm the day before your surgery with your arrival time. (If your surgery is on a Monday, we will call you the Friday before.)    Call [phone] after 7pm Monday-Friday if you did not receive this call. INSTRUCTIONS BEFORE YOUR SURGERY   When You  Arrive Arrive at the 2nd [address] [facility] on the day of your surgery  Have your insurance card, photo ID, and any copayment (if needed)   Food   and   Drink NO food or drink after midnight the night before surgery    This means NO water, gum, mints, coffee, juice, etc.  No alcohol (beer, wine, liquor) 24 hours before and after surgery   Medications to   TAKE   Morning of Surgery MEDICATIONS TO TAKE THE MORNING OF SURGERY WITH A SIP OF WATER:   Xanax   Medications  To  STOP      7 days before surgery  Non-Steroidal anti-inflammatory Drugs (NSAID's): for example, Ibuprofen (Advil, Motrin), Naproxen (Aleve)   Aspirin, if taking for pain    Herbal supplements, vitamins, and fish oil   Other:  (Pain medications not listed above, including Tylenol may be taken)   Blood  Thinners  If you take  Aspirin, Plavix, Coumadin, or any blood-thinning or anti-blood clot medicine, talk to the doctor who prescribed the medications for pre-operative instructions.    Bathing Clothing  Jewelry  Valuables      If you shower the morning of surgery, please do not apply anything to your skin (lotions, powders, deodorant, or makeup, especially mascara)   Follow [employer] body wash instructions provided to you during PAT appointment. Begin 3 days prior to surgery.  Do not shave or trim anywhere 24 hours before surgery   Wear your hair loose or down; no pony-tails, buns, or metal hair clips   Wear loose, comfortable, clean clothes   Wear glasses instead of contacts  [employer] money, valuables, and jewelry, including body piercings, at home   If you were given an [employer], bring it on day of surgery. Going Home - or Spending the Night  SAME-DAY SURGERY: You must have a responsible adult drive you home and stay with you 24 hours after surgery   ADMITS: If your doctor is keeping you in the hospital after surgery, leave personal belongings/luggage in your car until you have a hospital room number. Hospital discharge time is 12 noon  Drivers must be here before 12 noon unless you are told differently   Special Instructions It is now mandated that all surgical patients be tested for COVID-19 prior to surgery. Testing has to be exactly 4 days prior to surgery. Your COVID test date is today        Patients are advised to self-quarantine at home after testing and prior to your surgery date. You will be notified if your results are positive. What to watch for:   Coronavirus (COVID-19) affects different people in different ways   It also appears with a wide range of symptoms from mild to severe   Signs usually appear 2-14 days after exposure     If you develop any of the following, notify your doctor immediately:  o Fever  o Chills, with or without a shiver  o Muscle pain  o Headache  o Sore throat  o Dry cough  o New loss of taste or smell  o Tiredness      If you develop any of the following, call 911:  o Shortness of breath  o Difficulty breathing  o Chest pain  o New confusion  o Blueness of fingers and/or lips       Follow all instructions so your surgery wont be cancelled. Please, be on time.                     If a situation occurs and you are delayed the day of surgery, call  [phone]. If your physical condition changes (like a fever, cold, flu, etc.) call your surgeon. Home medication(s) reviewed and verified via      LIST   VERBAL   during PAT appointment. The patient was contacted by     IN-PERSON  The patient verbalizes understanding of all instructions and     DOES NOT   need reinforcement.

## 2022-01-07 LAB
ATRIAL RATE: 66 BPM
CALCULATED P AXIS, ECG09: 59 DEGREES
CALCULATED R AXIS, ECG10: 83 DEGREES
CALCULATED T AXIS, ECG11: 53 DEGREES
DIAGNOSIS, 93000: NORMAL
P-R INTERVAL, ECG05: 150 MS
Q-T INTERVAL, ECG07: 402 MS
QRS DURATION, ECG06: 134 MS
QTC CALCULATION (BEZET), ECG08: 421 MS
VENTRICULAR RATE, ECG03: 66 BPM

## 2022-01-09 LAB
SARS-COV-2, XPLCVT: NOT DETECTED
SOURCE, COVRS: NORMAL

## 2022-01-11 ENCOUNTER — ANESTHESIA EVENT (OUTPATIENT)
Dept: SURGERY | Age: 47
End: 2022-01-11
Payer: COMMERCIAL

## 2022-01-12 ENCOUNTER — HOSPITAL ENCOUNTER (OUTPATIENT)
Age: 47
Discharge: HOME | End: 2022-01-12
Attending: STUDENT IN AN ORGANIZED HEALTH CARE EDUCATION/TRAINING PROGRAM | Admitting: STUDENT IN AN ORGANIZED HEALTH CARE EDUCATION/TRAINING PROGRAM
Payer: COMMERCIAL

## 2022-01-12 ENCOUNTER — ANESTHESIA (OUTPATIENT)
Dept: SURGERY | Age: 47
End: 2022-01-12
Payer: COMMERCIAL

## 2022-01-12 VITALS
SYSTOLIC BLOOD PRESSURE: 118 MMHG | WEIGHT: 167.99 LBS | DIASTOLIC BLOOD PRESSURE: 80 MMHG | OXYGEN SATURATION: 95 % | HEART RATE: 93 BPM | HEIGHT: 67 IN | BODY MASS INDEX: 26.37 KG/M2 | TEMPERATURE: 98.8 F | RESPIRATION RATE: 16 BRPM

## 2022-01-12 DIAGNOSIS — G89.18 POSTOPERATIVE PAIN: Primary | ICD-10-CM

## 2022-01-12 PROBLEM — N83.8 OVARIAN MASS, LEFT: Status: ACTIVE | Noted: 2022-01-12

## 2022-01-12 PROBLEM — R10.2 PELVIC PAIN: Status: ACTIVE | Noted: 2022-01-12

## 2022-01-12 LAB
GLUCOSE BLD STRIP.AUTO-MCNC: 120 MG/DL (ref 65–117)
SERVICE CMNT-IMP: ABNORMAL

## 2022-01-12 PROCEDURE — 76030000005 HC AMB SURG OR TIME 2.5 TO 3: Performed by: STUDENT IN AN ORGANIZED HEALTH CARE EDUCATION/TRAINING PROGRAM

## 2022-01-12 PROCEDURE — 74011250636 HC RX REV CODE- 250/636: Performed by: ANESTHESIOLOGY

## 2022-01-12 PROCEDURE — 77030008606 HC TRCR ENDOSC KII AMR -B: Performed by: STUDENT IN AN ORGANIZED HEALTH CARE EDUCATION/TRAINING PROGRAM

## 2022-01-12 PROCEDURE — 76210000035 HC AMBSU PH I REC 1 TO 1.5 HR: Performed by: STUDENT IN AN ORGANIZED HEALTH CARE EDUCATION/TRAINING PROGRAM

## 2022-01-12 PROCEDURE — 2709999900 HC NON-CHARGEABLE SUPPLY: Performed by: STUDENT IN AN ORGANIZED HEALTH CARE EDUCATION/TRAINING PROGRAM

## 2022-01-12 PROCEDURE — 77030010507 HC ADH SKN DERMBND J&J -B: Performed by: STUDENT IN AN ORGANIZED HEALTH CARE EDUCATION/TRAINING PROGRAM

## 2022-01-12 PROCEDURE — 76060000065 HC AMB SURG ANES 2.5 TO 3 HR: Performed by: STUDENT IN AN ORGANIZED HEALTH CARE EDUCATION/TRAINING PROGRAM

## 2022-01-12 PROCEDURE — 77030009848 HC PASSR SUT SET COOP -C: Performed by: STUDENT IN AN ORGANIZED HEALTH CARE EDUCATION/TRAINING PROGRAM

## 2022-01-12 PROCEDURE — 77030031139 HC SUT VCRL2 J&J -A: Performed by: STUDENT IN AN ORGANIZED HEALTH CARE EDUCATION/TRAINING PROGRAM

## 2022-01-12 PROCEDURE — 77030012770 HC TRCR OPT FX AMR -B: Performed by: STUDENT IN AN ORGANIZED HEALTH CARE EDUCATION/TRAINING PROGRAM

## 2022-01-12 PROCEDURE — 77030035029 HC NDL INSUF VERES DISP COVD -B: Performed by: STUDENT IN AN ORGANIZED HEALTH CARE EDUCATION/TRAINING PROGRAM

## 2022-01-12 PROCEDURE — 82962 GLUCOSE BLOOD TEST: CPT

## 2022-01-12 PROCEDURE — 88305 TISSUE EXAM BY PATHOLOGIST: CPT

## 2022-01-12 PROCEDURE — 74011000250 HC RX REV CODE- 250: Performed by: STUDENT IN AN ORGANIZED HEALTH CARE EDUCATION/TRAINING PROGRAM

## 2022-01-12 PROCEDURE — 77030026438 HC STYL ET INTUB CARD -A: Performed by: ANESTHESIOLOGY

## 2022-01-12 PROCEDURE — 77030008684 HC TU ET CUF COVD -B: Performed by: ANESTHESIOLOGY

## 2022-01-12 PROCEDURE — 77030010517 HC APPL SEAL FLOSEL BAXT -B: Performed by: STUDENT IN AN ORGANIZED HEALTH CARE EDUCATION/TRAINING PROGRAM

## 2022-01-12 PROCEDURE — 77030020829: Performed by: STUDENT IN AN ORGANIZED HEALTH CARE EDUCATION/TRAINING PROGRAM

## 2022-01-12 PROCEDURE — 77030002933 HC SUT MCRYL J&J -A: Performed by: STUDENT IN AN ORGANIZED HEALTH CARE EDUCATION/TRAINING PROGRAM

## 2022-01-12 PROCEDURE — 77030034154 HC SHR COAG HARM ACE J&J -F: Performed by: STUDENT IN AN ORGANIZED HEALTH CARE EDUCATION/TRAINING PROGRAM

## 2022-01-12 PROCEDURE — 74011250636 HC RX REV CODE- 250/636: Performed by: NURSE ANESTHETIST, CERTIFIED REGISTERED

## 2022-01-12 PROCEDURE — 77030039147 HC PWDR HEMSTS SURGICEL JNJ -D: Performed by: STUDENT IN AN ORGANIZED HEALTH CARE EDUCATION/TRAINING PROGRAM

## 2022-01-12 PROCEDURE — 76210000050 HC AMBSU PH II REC 0.5 TO 1 HR: Performed by: STUDENT IN AN ORGANIZED HEALTH CARE EDUCATION/TRAINING PROGRAM

## 2022-01-12 PROCEDURE — 74011000250 HC RX REV CODE- 250: Performed by: NURSE ANESTHETIST, CERTIFIED REGISTERED

## 2022-01-12 PROCEDURE — 77030019905 HC CATH URETH INTMIT MDII -A: Performed by: STUDENT IN AN ORGANIZED HEALTH CARE EDUCATION/TRAINING PROGRAM

## 2022-01-12 PROCEDURE — 77030009851 HC PCH RTVR ENDOSC AMR -B: Performed by: STUDENT IN AN ORGANIZED HEALTH CARE EDUCATION/TRAINING PROGRAM

## 2022-01-12 RX ORDER — SODIUM CHLORIDE, SODIUM LACTATE, POTASSIUM CHLORIDE, CALCIUM CHLORIDE 600; 310; 30; 20 MG/100ML; MG/100ML; MG/100ML; MG/100ML
125 INJECTION, SOLUTION INTRAVENOUS CONTINUOUS
Status: DISCONTINUED | OUTPATIENT
Start: 2022-01-12 | End: 2022-01-12 | Stop reason: HOSPADM

## 2022-01-12 RX ORDER — IBUPROFEN 800 MG/1
800 TABLET ORAL
Qty: 30 TABLET | Refills: 0 | Status: SHIPPED | OUTPATIENT
Start: 2022-01-12 | End: 2022-01-22

## 2022-01-12 RX ORDER — MIDAZOLAM HYDROCHLORIDE 1 MG/ML
INJECTION, SOLUTION INTRAMUSCULAR; INTRAVENOUS AS NEEDED
Status: DISCONTINUED | OUTPATIENT
Start: 2022-01-12 | End: 2022-01-12 | Stop reason: HOSPADM

## 2022-01-12 RX ORDER — DEXAMETHASONE SODIUM PHOSPHATE 4 MG/ML
INJECTION, SOLUTION INTRA-ARTICULAR; INTRALESIONAL; INTRAMUSCULAR; INTRAVENOUS; SOFT TISSUE AS NEEDED
Status: DISCONTINUED | OUTPATIENT
Start: 2022-01-12 | End: 2022-01-12 | Stop reason: HOSPADM

## 2022-01-12 RX ORDER — BUPIVACAINE HYDROCHLORIDE 5 MG/ML
INJECTION, SOLUTION EPIDURAL; INTRACAUDAL AS NEEDED
Status: DISCONTINUED | OUTPATIENT
Start: 2022-01-12 | End: 2022-01-12 | Stop reason: HOSPADM

## 2022-01-12 RX ORDER — ALBUTEROL SULFATE 0.83 MG/ML
2.5 SOLUTION RESPIRATORY (INHALATION) AS NEEDED
Status: DISCONTINUED | OUTPATIENT
Start: 2022-01-12 | End: 2022-01-12 | Stop reason: HOSPADM

## 2022-01-12 RX ORDER — PROPOFOL 10 MG/ML
INJECTION, EMULSION INTRAVENOUS
Status: DISCONTINUED | OUTPATIENT
Start: 2022-01-12 | End: 2022-01-12 | Stop reason: HOSPADM

## 2022-01-12 RX ORDER — ROCURONIUM BROMIDE 10 MG/ML
INJECTION, SOLUTION INTRAVENOUS AS NEEDED
Status: DISCONTINUED | OUTPATIENT
Start: 2022-01-12 | End: 2022-01-12 | Stop reason: HOSPADM

## 2022-01-12 RX ORDER — NEOSTIGMINE METHYLSULFATE 1 MG/ML
INJECTION, SOLUTION INTRAVENOUS AS NEEDED
Status: DISCONTINUED | OUTPATIENT
Start: 2022-01-12 | End: 2022-01-12 | Stop reason: HOSPADM

## 2022-01-12 RX ORDER — LIDOCAINE HYDROCHLORIDE 20 MG/ML
INJECTION, SOLUTION EPIDURAL; INFILTRATION; INTRACAUDAL; PERINEURAL AS NEEDED
Status: DISCONTINUED | OUTPATIENT
Start: 2022-01-12 | End: 2022-01-12 | Stop reason: HOSPADM

## 2022-01-12 RX ORDER — HYDROMORPHONE HYDROCHLORIDE 1 MG/ML
0.5 INJECTION, SOLUTION INTRAMUSCULAR; INTRAVENOUS; SUBCUTANEOUS
Status: DISCONTINUED | OUTPATIENT
Start: 2022-01-12 | End: 2022-01-12 | Stop reason: HOSPADM

## 2022-01-12 RX ORDER — DIPHENHYDRAMINE HYDROCHLORIDE 50 MG/ML
12.5 INJECTION, SOLUTION INTRAMUSCULAR; INTRAVENOUS AS NEEDED
Status: DISCONTINUED | OUTPATIENT
Start: 2022-01-12 | End: 2022-01-12 | Stop reason: HOSPADM

## 2022-01-12 RX ORDER — LIDOCAINE HYDROCHLORIDE 10 MG/ML
0.1 INJECTION, SOLUTION EPIDURAL; INFILTRATION; INTRACAUDAL; PERINEURAL AS NEEDED
Status: DISCONTINUED | OUTPATIENT
Start: 2022-01-12 | End: 2022-01-12 | Stop reason: HOSPADM

## 2022-01-12 RX ORDER — SODIUM CHLORIDE 0.9 % (FLUSH) 0.9 %
5-40 SYRINGE (ML) INJECTION EVERY 8 HOURS
Status: DISCONTINUED | OUTPATIENT
Start: 2022-01-12 | End: 2022-01-12 | Stop reason: HOSPADM

## 2022-01-12 RX ORDER — ONDANSETRON 2 MG/ML
INJECTION INTRAMUSCULAR; INTRAVENOUS AS NEEDED
Status: DISCONTINUED | OUTPATIENT
Start: 2022-01-12 | End: 2022-01-12 | Stop reason: HOSPADM

## 2022-01-12 RX ORDER — OXYCODONE AND ACETAMINOPHEN 5; 325 MG/1; MG/1
1 TABLET ORAL
Qty: 12 TABLET | Refills: 0 | Status: SHIPPED | OUTPATIENT
Start: 2022-01-12 | End: 2022-01-15

## 2022-01-12 RX ORDER — PROPOFOL 10 MG/ML
INJECTION, EMULSION INTRAVENOUS AS NEEDED
Status: DISCONTINUED | OUTPATIENT
Start: 2022-01-12 | End: 2022-01-12 | Stop reason: HOSPADM

## 2022-01-12 RX ORDER — ONDANSETRON 2 MG/ML
4 INJECTION INTRAMUSCULAR; INTRAVENOUS AS NEEDED
Status: DISCONTINUED | OUTPATIENT
Start: 2022-01-12 | End: 2022-01-12 | Stop reason: HOSPADM

## 2022-01-12 RX ORDER — KETOROLAC TROMETHAMINE 30 MG/ML
INJECTION, SOLUTION INTRAMUSCULAR; INTRAVENOUS AS NEEDED
Status: DISCONTINUED | OUTPATIENT
Start: 2022-01-12 | End: 2022-01-12 | Stop reason: HOSPADM

## 2022-01-12 RX ORDER — EPHEDRINE SULFATE/0.9% NACL/PF 50 MG/5 ML
SYRINGE (ML) INTRAVENOUS AS NEEDED
Status: DISCONTINUED | OUTPATIENT
Start: 2022-01-12 | End: 2022-01-12 | Stop reason: HOSPADM

## 2022-01-12 RX ORDER — FENTANYL CITRATE 50 UG/ML
INJECTION, SOLUTION INTRAMUSCULAR; INTRAVENOUS AS NEEDED
Status: DISCONTINUED | OUTPATIENT
Start: 2022-01-12 | End: 2022-01-12 | Stop reason: HOSPADM

## 2022-01-12 RX ORDER — SODIUM CHLORIDE, SODIUM LACTATE, POTASSIUM CHLORIDE, CALCIUM CHLORIDE 600; 310; 30; 20 MG/100ML; MG/100ML; MG/100ML; MG/100ML
150 INJECTION, SOLUTION INTRAVENOUS CONTINUOUS
Status: DISCONTINUED | OUTPATIENT
Start: 2022-01-12 | End: 2022-01-12 | Stop reason: HOSPADM

## 2022-01-12 RX ORDER — SODIUM CHLORIDE 0.9 % (FLUSH) 0.9 %
5-40 SYRINGE (ML) INJECTION AS NEEDED
Status: DISCONTINUED | OUTPATIENT
Start: 2022-01-12 | End: 2022-01-12 | Stop reason: HOSPADM

## 2022-01-12 RX ORDER — GLYCOPYRROLATE 0.2 MG/ML
INJECTION INTRAMUSCULAR; INTRAVENOUS AS NEEDED
Status: DISCONTINUED | OUTPATIENT
Start: 2022-01-12 | End: 2022-01-12 | Stop reason: HOSPADM

## 2022-01-12 RX ADMIN — GLYCOPYRROLATE 0.4 MG: 0.2 INJECTION INTRAMUSCULAR; INTRAVENOUS at 10:06

## 2022-01-12 RX ADMIN — ROCURONIUM BROMIDE 50 MG: 10 INJECTION INTRAVENOUS at 08:04

## 2022-01-12 RX ADMIN — PROPOFOL 150 MG: 10 INJECTION, EMULSION INTRAVENOUS at 08:02

## 2022-01-12 RX ADMIN — Medication 5 MG: at 08:35

## 2022-01-12 RX ADMIN — LIDOCAINE HYDROCHLORIDE 40 MG: 20 INJECTION, SOLUTION INTRAVENOUS at 08:02

## 2022-01-12 RX ADMIN — ONDANSETRON HYDROCHLORIDE 4 MG: 2 SOLUTION INTRAMUSCULAR; INTRAVENOUS at 08:11

## 2022-01-12 RX ADMIN — SODIUM CHLORIDE, POTASSIUM CHLORIDE, SODIUM LACTATE AND CALCIUM CHLORIDE: 600; 310; 30; 20 INJECTION, SOLUTION INTRAVENOUS at 07:53

## 2022-01-12 RX ADMIN — KETOROLAC TROMETHAMINE 30 MG: 30 INJECTION, SOLUTION INTRAMUSCULAR; INTRAVENOUS at 10:04

## 2022-01-12 RX ADMIN — FENTANYL CITRATE 100 MCG: 50 INJECTION INTRAMUSCULAR; INTRAVENOUS at 08:02

## 2022-01-12 RX ADMIN — FENTANYL CITRATE 50 MCG: 50 INJECTION INTRAMUSCULAR; INTRAVENOUS at 08:23

## 2022-01-12 RX ADMIN — ROCURONIUM BROMIDE 10 MG: 10 INJECTION INTRAVENOUS at 09:40

## 2022-01-12 RX ADMIN — PROPOFOL 50 MCG/KG/MIN: 10 INJECTION, EMULSION INTRAVENOUS at 08:02

## 2022-01-12 RX ADMIN — DEXAMETHASONE SODIUM PHOSPHATE 8 MG: 4 INJECTION, SOLUTION INTRAMUSCULAR; INTRAVENOUS at 08:11

## 2022-01-12 RX ADMIN — FENTANYL CITRATE 50 MCG: 50 INJECTION INTRAMUSCULAR; INTRAVENOUS at 09:38

## 2022-01-12 RX ADMIN — LIDOCAINE HYDROCHLORIDE 20 MG: 20 INJECTION, SOLUTION INTRAVENOUS at 10:28

## 2022-01-12 RX ADMIN — MIDAZOLAM 3 MG: 1 INJECTION, SOLUTION INTRAMUSCULAR; INTRAVENOUS at 07:54

## 2022-01-12 RX ADMIN — Medication 3 MG: at 10:06

## 2022-01-12 RX ADMIN — FENTANYL CITRATE 50 MCG: 50 INJECTION INTRAMUSCULAR; INTRAVENOUS at 09:05

## 2022-01-12 RX ADMIN — ROCURONIUM BROMIDE 10 MG: 10 INJECTION INTRAVENOUS at 09:27

## 2022-01-12 NOTE — BRIEF OP NOTE
Brief Postoperative Note    Patient: Adriana Urrutia  YOB: 1975  MRN: [id number]    Date of Procedure: 1/12/2022     Pre-Op Diagnosis: COMPLEX LEFT OVARIAN MASS  PELVIC PAIN    Post-Op Diagnosis: Same as preoperative diagnosis. and extensive bowel and omental adhesions      Procedure(s):  LAPAROSCOPIC LEFT SALPINGO-OOPHORECTOMY, LYSIS OF ADHESIONS    Surgeon(s):  Thomas Duncan DO    Surgical Assistant: Surg Asst-1: Jacinda Bejarano    Anesthesia: General     Estimated Blood Loss (mL): 25cc, UOP 125cc, IVF 862LH LR     Complications: None    Specimens:   ID Type Source Tests Collected by Time Destination   1 : left fallopian tube and left ovary Preservative Fallopian Tube  Thomas Duncan DO 1/12/2022 0956 Pathology        Implants: None    Drains: None     Findings: Extensive omental adhesions in midline near umbilicus and in RUQ of the abdomen, Dayhoit Janak appearance of adhesions on the liver, bowel adherent to both pelvic side walls, adhesions of the sigmoid colon on the left were carefully dissected to allow visualization of the left ovary and fallopian tube. The left ovary was ~5cm in size with what appeared to be a 3cm simple appearing cyst, normal smooth surface. The left fallopian tube appeared normal. Both the ovary and fallopian tube had adhesions to the left pelvic sidewall. The right adnexa was not explored as adhesions of the bowel on that side made visualization difficult and it was felt more potential for harm than good to exam that area. She had normal appearing right ovary on US imaging.      Electronically Signed by Rowena Rocha DO on 1/12/2022 at 10:22 AM

## 2022-01-12 NOTE — H&P
Day of surgery H&P Update     Pt seen and examined. No interval changes. Please see paper H&P from the office on 12/29/21. Diagnosis is complex left ovarian mass, pelvic pain, dyspareunia. Planned procedure is laparoscopic left salpingo-oophorectomy. Consents reviewed and signed and all questions answered. SCDs for DVT PPx. ABX PPx not indicated. Proceed to OR.      Olive Brandt,

## 2022-01-12 NOTE — PERIOP NOTES
POST ANESTHESIA CARE    DISCHARGE / TRANSFER NOTE  Laurie Rodrigues was:    [x] discharged        via   [x] Wheelchair          to [x] Private Vehicle     [] transferred   [] Carried   [] Taxi / Vehicle \"for Hire\"  [] Walk out  [] Ambulance / Medical Transportation   [] Stretcher  [] Hospital room _**_          [] Bed      Patient was escorted by:      [x] Nurse   [] Volunteer [] Transporter / Technician  [] Parent      [] Spouse / Family /      Patient verbalized     [x] appreciation and was very pleased with care received   [] frustration with care received       throughout their stay. Patient was discharged in     [x] pleasant mood  [] sad mood  [] mad mood . Pain at discharge/transfer was      2  /10. Discharge, medication and follow-up instructions were verbalized as understood prior to discharge  (if applicable for same-day procedures being discharged.)    All personal belongings have been returned to patient, and patient/family verbally confirm receiving belongings as all present.

## 2022-01-12 NOTE — ANESTHESIA PREPROCEDURE EVALUATION
Relevant Problems   No relevant active problems       Anesthetic History   No history of anesthetic complications            Review of Systems / Medical History  Patient summary reviewed, nursing notes reviewed and pertinent labs reviewed    Pulmonary          Smoker         Neuro/Psych         Psychiatric history    Comments: Anxiety/depression Cardiovascular  Within defined limits                Exercise tolerance: >4 METS     GI/Hepatic/Renal  Within defined limits              Endo/Other    Diabetes: well controlled, type 2         Other Findings   Comments: Cysts on ovary           Physical Exam    Airway  Mallampati: III    Neck ROM: normal range of motion   Mouth opening: Normal     Cardiovascular    Rhythm: regular  Rate: normal         Dental  No notable dental hx       Pulmonary  Breath sounds clear to auscultation               Abdominal         Other Findings            Anesthetic Plan    ASA: 2  Anesthesia type: general          Induction: Intravenous  Anesthetic plan and risks discussed with: Patient      Informed consent obtained.

## 2022-01-12 NOTE — ANESTHESIA POSTPROCEDURE EVALUATION
Procedure(s):  LAPAROSCOPIC LEFT SALPINGO-OOPHORECTOMY. general    Anesthesia Post Evaluation      Multimodal analgesia: multimodal analgesia not used between 6 hours prior to anesthesia start to PACU discharge  Patient location during evaluation: PACU  Patient participation: complete - patient participated  Level of consciousness: awake and alert and sleepy but conscious  Pain score: 0  Pain management: adequate  Airway patency: patent  Anesthetic complications: no  Cardiovascular status: hemodynamically stable and acceptable  Respiratory status: acceptable  Hydration status: acceptable  Comments: Patient seen and evaluated; no concerns. Post anesthesia nausea and vomiting:  none      INITIAL Post-op Vital signs:   Vitals Value Taken Time   /83 01/12/22 1115   Temp 37.1 °C (98.8 °F) 01/12/22 1041   Pulse 95 01/12/22 1116   Resp 15 01/12/22 1116   SpO2 97 % 01/12/22 1116   Vitals shown include unvalidated device data.

## 2022-01-12 NOTE — DISCHARGE INSTRUCTIONS
Discharge Instructions for outpatient GYN surgery    Patient ID:  Mellissa Yoo  [id number]  42 y.o.  1975    Take Home Medications     Follow-up with Dr Alpa Ramachandran in 2 weeks, call office for appointment, [phone]    Follow-up care is a key part of your treatment and safety. Be sure to schedule and keep all recommended follow up appointments    What to Expect at [address] might feel a bit sleepy, groggy or nauseous today because of the anesthetic or pain medication you received. Do not drive today. These symptoms should resolve by tomorrow. You will probably feel some cramping over the next day or two- this is normal.  You may take an over-the-counter pain medication such as Tylenol, Aleve, Motrin, Advil (ibuprofen) or you may have been given a prescription pain medication. Try to limit use of prescription pain medication to just a day or two, as they can cause constipation. You will probably experience some light vaginal bleeding or spotting. This is normal.     How can you care for yourself at home? Activity  Pelvic rest for 2-4 weeks (nothing in your vagina such as tampons, intercourse or douching)  You man return to work and normal activities tomorrow or the next day. If you are feeling well, you can also resume exercise. If you are having pain or not feeling well, you should wait a few days before exercising. Diet  Regular diet as tolerated  Drink plenty of fluids    Medicines  Take pain medicines as directed by your doctor. If you a prescription for pain medicine, take as needed . If you are not taking a prescription pain medicine, take an over-the-counter medicine such as acetaminophen (Tylenol), ibuprofen (Advil, Motrin), or naproxen (Aleve). Read and follow all instructions on the label. Do not take two or more pain medicines at the same time unless the doctor told you to. Many pain medicines contain acetaminophen, which is Tylenol.  Too much Tylenol can be harmful. If your doctor prescribed antibiotics, take them as directed. Do not stop taking them just because you feel better. If you think your pain medicine is making your stomach upset:  Take your medicine after meals (unless your doctor tells you not to). Ask your doctor for a different pain medicine. Call your doctor  or seek immediate medical care if you have:  bright red vaginal bleeding that soaks one or more pads in an hour, or you have large clots. foul-smelling discharge from your vagina. persistent nausea and vomiting  pain that does not get better after you take pain medicine. signs of infection, such as: Increased pain, swelling, warmth, or redness. A persistent fever of 101.5 F  signs of a blood clot, such as:  Pain, redness or swelling in your lower extremeties  You have trouble passing urine or stool, especially if you have pain or swelling in your lower belly. hot flashes, sweating, flushing, or a fast or pounding heartbeat.:  no bowel movement after taking a laxative. loss of consciousness  sudden chest pain and shortness of breath, or you cough up blood. persistent abdominal pain despite taking pain medication          DISCHARGE SUMMARY from Your Nurse    PATIENT INSTRUCTIONS:    AFTER ANESTHESIA & SEDATION, and WHILE TAKING PAIN MEDICINE  After general anesthesia / intravenous sedation and the 24 hours following, and/or while taking prescription Opiates:  · Limit your activities  · Do not drive and operate hazardous machinery until you have been of all narcotics and sedatives for over 24 hours  · Do not make important personal or business decisions  · Do  not drink alcoholic beverages  · If you have not urinated within 8 hours after discharge, please contact your surgeon on call.         SIGNS OF INFECTION, THINGS TO REPORT TO YOUR DOCTOR  Report the following Signs of Infection or General Problems after surgery to your surgeon:  · Excessive pain, swelling, redness, drainage, pus or odor of or around the surgical area  · Fever/ temperature over 101; Temperature over 100 if on medications (chemotherapy or medicines which affect your ability to fight infections)  · Nausea and vomiting lasting longer than 4 hours or if unable to take medications  · Any signs of decreased circulation or nerve impairment to extremity: change in color, persistent  numbness, tingling, coldness or increase pain  · If you have any questions. GOOD HELP TO FIGHT AN INFECTION  Here are a few tip to help reduce the chance of getting an infection after surgery:   Wash Your Hands   Good handwashing is the most important thing you and your caregiver can do.  Wash before and after caring for any wounds. Dry your hand with a clean towel.  Wash with soap and water for at least 20 seconds. A TIP: sing the \"Happy Birthday\" song through one time while washing to help with the timing.  Use a hand  in between washings.  Shower   When your surgeon says it is OK to take a shower, use a new bar of antibacterial soap (if that is what you use, and keep that bar of soap ONLY for your use), or antibacterial body wash.  Use a clean wash cloth or sponge when you bathe.  Dry off with a clean towel  after every bath - be careful around any wounds, skin staples, sutures or surgical glue over/on wounds.  Do not enter swimming pools, hot tubs, lakes, rivers and/or ocean until wounds are healed and your doctor/surgeon says it is OK.  Use Clean Sheets   Sleep on freshly laundered sheets after your surgery.  Keep the surgery site covered with a clean, dry bandage (if instructed to do so). If the bandage becomes soiled, reapply a new, dry, clean bandage.  Do not allow pets to sleep with you while your wound is healing.  Lifestyle Modification and Controlling Your Blood Sugar   Smoking slows wound healing. Stop smoking and limit exposure to second-hand smoke.  High blood sugar slows wound healing. Eat a well-balanced diet to provide proper nutrition while healing   Monitor your blood sugar (if you are a diabetic) and take your medications as you are suppose to so you can control you blood sugar after surgery. COUGH AND DEEP BREATHE  Breathing deep and coughing are very important exercises to do after surgery. Deep breathing and coughing open the little air tubes and air sacks in your lungs. You take deep breaths every day. You may not even notice - it is just something you do when you sigh or yawn. It is a natural exercise you do to keep these air passages open. After surgery, take deep breaths and cough, on purpose. Coughing and deep breathing help prevent bronchitis and pneumonia after surgery. If you had chest or belly surgery, use a pillow as a \"hug belkys\" and hold it tightly to your chest or belly when you cough. DIRECTIONS:  1. Take 10 to 15 slow deep breaths every hour while awake. 2. Breathe in deeply, and hold it for 2 seconds. 3. Exhale slowly through puckered lips, like blowing up a balloon. 4. After every 4th or 5th deep breath, hug your pillow to your chest or belly and give a hard, deep cough. Yes, it will probably hurt if you had abdominal surgery. But doing this exercise is very important part of healing after surgery. Take your pain medicine to help you do this exercise without too much pain. ANKLE PUMPS    Ankle pumps increase the circulation of oxygenated blood to your lower extremities and decrease your risk for circulation problems such as blood clots. They also stretch the muscles, tendons and ligaments in your foot and ankle, and prevent joint contracture in the ankle and foot, especially after surgeries on the legs. It is important to do ankle pump exercises regularly after surgery because immobility increases your risk for developing a blood clot. Your doctor may also have you take an Aspirin for the next few days as well.       If your doctor did not ask you to take an Aspirin, consult with him before starting Aspirin therapy on your own. The exercise is quite simple. · Slowly point your foot forward, feeling the muscles on the top of your lower leg stretch, and hold this position for 5 seconds. · Next, pull your foot back toward you as far as possible, stretching the calf muscles, and hold that position for 5 seconds. · Repeat with the other foot. · Perform 10 repetitions every hour while awake for both ankles if possible (down and then up with the foot once is one repetition). You should feel gentle stretching of the muscles in your lower leg when doing this exercise. If you feel pain, or your range of motion is limited, don't push too hard. Only go the limit your joint and muscles will let you go. If you have increasing pain, progressively worsening leg warmth or swelling, STOP the exercise and call your doctor. Other Wound Care information:  [x] No additional recommendations. Below is information on the medication(s) your doctor is prescribing for you: The maximum daily dose of acetaminophen was discussed with the patient. She was encouraged not to exceed 3,000 mg of acetaminophen during a 24 hour period and was asked to keep in mind that acetaminophen can also be found in many over-the-counter cold medications as well as narcotics that may be given for pain. The patient expresses understanding of these issues and questions were answered. 4 THINGS ABOUT PAIN MEDICINE I ALWAYS TALK ABOUT:  There are 4 side effects I always talk about for pain medications. 1. They make you sleepy and drowsy. Do not drive a car or operate machines while taking pain medication. Do not make any major decisions. Take a nap. Relax. Let your body recover from the affects of anesthesia and surgery. 2. Some people have quite a problem with itching and. ..  3. Nausea and/or vomiting. These are mention together because they are a related genetic issue; while some people experience these problems, others do not. These are expected and know side effects. Itching is caused by histamine release - practically all opiate medications can cause this. An over-the-counter anti-histamine can help. Over-the-counter Benadryl® (the generic drug name is diphenhydramine) can help, but may cause increased drowsiness which can be intensified by pain medications. Over-the-counter Claritin® (the generic drug name is loratadine) or Zyrtec® (the generic drug name is cetirizine) may be effective without as much drowsiness as with the Benadryl/diphenhydramine. If you have nausea, like the itching, practically all opioids can cause this. Hopefully your surgeon may have given you some medicine for nausea. If your surgeon did not give you anti-nausea medications, and you are experiencing nausea/vomiting that prevent you from drinking clear liquids, CALL HIM/HER and request them, especially if these issues seem to get worse after you leave the hospital.    4. Last but not least is the problem of constipation (not obi able to have a bowel movement - poop.)  All pain medicine can slow down the movement of food through the gut. The slower it goes, the worse it can be. This only adds insult to the injury of surgery. And if you had tummy surgery, like having your gall bladder removed or a hernia repair, YOU DO NOT WANT THIS PROBLEM. There are 4 things I recommend. · Drink lots of fluids. For healthy people with no heart problems, this means at least 64 ounces of liquids or more per day. For example, a Big Gulp® from [employer] is 32 ounces. So you need to drink at least 2  Big Gulp®'s of fluids every day. If you have heart problems you may not be able to do this. Talk to your doctor about what you should do to prevent constipation.     · Drinking fruit juice like apple, pear, or prune juice gives you extra \"BANG\" for your beverage. These drinks are high in natural fiber. If you are a diabetic, drink sugar-free fluids with fiber additives (see next 2 points.)  Avoid drinking extra fruit juice unless this is a regular part of your diet plan. · Eat extra fresh fruits and vegetables. · Add extra fiber-products. Fiber products like Metamucil®, Citrucel®, Miralax® or Benefiber® can help. These products are over-the-counter and you do not need a prescription from your doctor. If you have followed these recommendations and still have some difficulty having a good poop, take and over-the-counter stimulant like Dulcolax® (biscodyl)  or Senokot® (senna concentrate). These may help get things moving. Ignacio Smith MEDICATION AND   SIDE EFFECT GUIDE    The [facility] MEDICATION AND SIDE EFFECT GUIDE was provided to the PATIENT AND CARE PROVIDER. Information provided includes instruction about drug purpose and common side effects for the following medications:   · IBUPROFEN  · PERCOCET      Medication information added to discharge record on January 12, 2022 at 10:20 AM.      Some information we wish all of our patients to be familiar with and General Information for Healthy Lifestyle choices:    · Make a list of your current medications with your Primary Care Provider. · Update this list whenever your medications are discontinued, doses are changed, or new medications (including over-the-counter products like ibuprofen, vitamins, or herbal remedies) are added. · Carry medication information at all times in case of emergency situations      No smoking / No tobacco products / Avoid exposure to second hand smoke    Surgeon General's Warning:  Quitting smoking now greatly reduces serious risk to your health. Obesity, smoking, and sedentary lifestyle greatly increases your risk for illness.   A healthy diet, regular physical exercise & weight monitoring are important for maintaining a healthy lifestyle. A Note About Congestive Heart Failure: You may be retaining fluid if you have a history of heart failure or if you experience any of the following symptoms:      · Weight gain of 3 pounds or more overnight or 5 pounds in a week  · Increased swelling in our hands or feet  · Shortness of breath while lying flat in bed      Please call your doctor as soon as you notice any of these symptoms; do not wait until your next office visit. A Note About Strokes:  Recognize signs and symptoms of STROKE. The simple mnemonic, F.A.S.T., can help you remember signs of a stroke and what to do if you suspect a stroke is occuring to you or someone you are with:    F - Face looks uneven  A - Arms unable to move, or move evenly  S - Speech is slurred or non-existent  T - Time - CALL 911 as soon as signs and symptoms begin - DO NOT go to bed or wait to see if you get better - TIME IS BRAIN. Warning Signs of HEART ATTACK   Call 911 if you have these symptoms:     Chest discomfort. Most heart attacks involve discomfort in the center of the chest that lasts more than a few minutes, or that goes away and comes back. It can feel like uncomfortable pressure, squeezing, fullness, or pain.  Discomfort in other areas of the upper body. Symptoms can include pain or discomfort in one or both arms, the back, neck, jaw, or stomach.  Shortness of breath with or without chest discomfort.  Other signs may include breaking out in a cold sweat, nausea, or lightheadedness. Don't wait more than five minutes to call 911 - MINUTES MATTER! Fast action can save your life. Calling 911 is almost always the fastest way to get lifesaving treatment. Emergency Medical Services staff can begin treatment when they arrive -- up to an hour sooner than if someone gets to the hospital by car. Learning About Coronavirus [phone])  Coronavirus [phone]): Overview  What is coronavirus (COVID-19)?   The coronavirus disease (COVID-19) is caused by a virus. It is an illness that was first found in [place], [place], in December 2019. It has since spread worldwide. The virus can cause fever, cough, and trouble breathing. In severe cases, it can cause pneumonia and make it hard to breathe without help. It can cause death. Coronaviruses are a large group of viruses. They cause the common cold. They also cause more serious illnesses like Middle East respiratory syndrome (MERS) and severe acute respiratory syndrome (SARS). COVID-19 is caused by a novel coronavirus. That means it's a new type that has not been seen in people before. This virus spreads person-to-person through droplets from coughing and sneezing. It can also spread when you are close to someone who is infected. And it can spread when you touch something that has the virus on it, such as a doorknob or a tabletop. What can you do to protect yourself from coronavirus (COVID-19)? The best way to protect yourself from getting sick is to:  · Avoid areas where there is an outbreak. · Avoid contact with people who may be infected. · Wash your hands often with soap or alcohol-based hand sanitizers. · Avoid crowds and try to stay at least 6 feet away from other people. · Wash your hands often, especially after you cough or sneeze. Use soap and water, and scrub for at least 20 seconds. If soap and water aren't available, use an alcohol-based hand . · Avoid touching your mouth, nose, and eyes. What can you do to avoid spreading the virus to others? To help avoid spreading the virus to others:  · Cover your mouth with a tissue when you cough or sneeze. Then throw the tissue in the trash. · Use a disinfectant to clean things that you touch often. · Stay home if you are sick or have been exposed to the virus. Don't go to school, work, or public areas. And don't use public transportation. · If you are sick:  ? Leave your home only if you need to get medical care.  But call the doctor's office first so they know you're coming. And wear a face mask, if you have one.  ? If you have a face mask, wear it whenever you're around other people. It can help stop the spread of the virus when you cough or sneeze. ? Clean and disinfect your home every day. Use household  and disinfectant wipes or sprays. Take special care to clean things that you grab with your hands. These include doorknobs, remote controls, phones, and handles on your refrigerator and microwave. And don't forget countertops, tabletops, bathrooms, and computer keyboards. When to call for help  Call 911 anytime you think you may need emergency care. For example, call if:  · You have severe trouble breathing. (You can't talk at all.)  · You have constant chest pain or pressure. · You are severely dizzy or lightheaded. · You are confused or can't think clearly. · Your face and lips have a blue color. · You pass out (lose consciousness) or are very hard to wake up. Call your doctor now if you develop symptoms such as:  · Shortness of breath. · Fever. · Cough. If you need to get care, call ahead to the doctor's office for instructions before you go. Make sure you wear a face mask, if you have one, to prevent exposing other people to the virus. Where can you get the latest information? The following health organizations are tracking and studying this virus. Their websites contain the most up-to-date information. Cele Lugo also learn what to do if you think you may have been exposed to the virus. · U.S. Centers for Disease Control and Prevention (CDC): The CDC provides updated news about the disease and travel advice. The website also tells you how to prevent the spread of infection. www.cdc.gov  · World Health Organization [facility]): WHO offers information about the virus outbreaks.  WHO also has travel advice. www.who.int  Current as of: April 1, 2020               Content Version: 12.4  © 8787-1304 [employer]. Care instructions adapted under license by your healthcare professional. If you have questions about a medical condition or this instruction, always ask your healthcare professional. [place] [address] any warranty or liability for your use of this information. AT THE COMPLETION OF DISCHARGE INSTRUCTION REVIEW, WE VERIFY:  The discharge information has been reviewed with the patient and caregiver. Questions have been asked and answered meeting patient and caregiver expectations. The patient and caregiver verbalized understanding. Your discharge nurse was Marbella Avitia RN-BC       Board Certified - Pain Management      CONTENTS FOUND IN YOUR DISCHARGE ENVELOPE:  [x]     Surgeon and Hospital Discharge Instructions  [x]     [facility] Care Provider Card  [x]     Medication & Side Effect Guide            (your newly prescribed medications have been marked/highlighted showing the most common side effects from the classes of drugs on your prescriptions)  [x]     Medication Prescription(s) x 2 ( [x] These have been sent electronically to your pharmacy by your surgeon,   - OR -       your surgeon has already provided these to you during a previous/pre-op office visit)  []     Pain block and/or block with On-Q Catheter from Anesthesia Service (information included in your instructions above)        []    [facility] Education Information  []     Physical Therapy Prescription  []     Follow-up Appointment Cards  []     Surgery-related Pictures/Media  []     Medical device information sheets/pamphlets from their    []     School/work excuse note. []     /parent work excuse note.       The following personal items collected during your admission for safe keeping are returned to you:     Dental Appliance: Dental Appliances: None  Vi louie: Visual Aid: Glasses  Hearing Aid:    Jewelry: Jewelry: None  Clothing: Clothing: Footwear,Shirt,Pants,Undergarments,Jacket/Coat  Other Valuables:  Other Valuables: None  Valuables sent to safe:

## 2022-01-12 NOTE — PERIOP NOTES
PACU IN REPORT FROM ANESTHESIA    Verbal report received from   Pari Rosales   [] MD/-Anesthesiologist    [x] CRNA   [x] with student    CHOICE ANESTHESIA:  [x] GENERAL  [] TIVA  [] MAC  [] LOCAL  [] REGIONAL  [] SPINAL   [] EPIDURAL   **Note the anesthesia record for medications given intraoperatively. **           [] E.R.A.S. PROTOCOL    SURGICAL PROCEDURE: Procedure(s) (LRB):  LAPAROSCOPIC LEFT SALPINGO-OOPHORECTOMY (N/A)     SURGEON: Tamiko VUONG DO.    Brief Initial Visual Assessment:    Patient Age: [] Infant(1-12mo)      []Pediatric(1-13yrs)    [] Adolescent(13-18yrs)    [x] Adult(18-65yrs)      []Geriatric Adult(>65yrs). Patient    [] Alert           []Calm & Cooperative      [] Anxious  Appearance: [] Drowsy      [x] Sedated      [x] Unresponsive     Oriented x  0            Airway:     [x] Patent                          [] Obstructs easily/Obstructed on arrival   [] \"Difficult Airway\" report by Anesthesia                        [] Airway improved with head/airway repositioning                       Airway Adjuncts Present: [] Oral Airway    [] Nasal Trumpet    [] ETT    [] LMA            Respiratory  [x] Even   [] Labored   [] Shallow   [] Tachypnea   [] Bradypnea  Pattern:    [x] Non-Labored  [] VENT and/or respiratory assistance     being provided. Skin:     [x] Pink [] Dusky    [] Pale        [x] Warm    [] Hot [] Cool       [] Cold   [x]Dry [] Moist [] Diaphoretic     Membranes:  [x] Pink [] Pale       [x] Moist [] Dry     [] Crusty     Pain:   [x] No Acute Discomfort. 0  /10 Scale [x] Verbal Numeric   [] Moderate Discomfort.     [] V.A.S. [] Acute Discomfort. [x] A.N.V.    [] Chronic-Issue Related Discomfort.   [] F.L.A.C.C. Note E-MAR for medications administered. []Faces, Shafer/Baker    Note assessments documented in flowsheets; any assessment variants to be found in comments or narrative perioperative nurse notes.        Post-anesthesia care now assumed by [facility] BSN, RN-BC

## 2022-01-12 NOTE — OP NOTES
[facility]eddy [facility] 81  OPERATIVE REPORT    Name:  Swati Arellano  MR#:  [id number]  :  1975  ACCOUNT #:  [de-identified]  DATE OF SERVICE:  2022    PREOPERATIVE DIAGNOSES:  A 44-year-old  3, para 3 with left lower quadrant pain, dyspareunia and complex left ovarian mass. POSTOPERATIVE DIAGNOSES:  A 44-year-old  3, para 3 with left lower quadrant pain, dyspareunia and complex left ovarian mass, with extensive bowel and omental adhesions. PROCEDURE PERFORMED:  Laparoscopic left salpingo-oophorectomy with extensive lysis of adhesions. SURGEON:  Ning Manzo DO    ASSISTANT:  Deborah Wolfe SA     ANESTHESIA:  General.    COMPLICATIONS:  None. SPECIMENS REMOVED:  Left fallopian tube and left ovary. IMPLANTS:  None. ESTIMATED BLOOD LOSS:  25 mL. URINE OUTPUT:  125 mL. IV FLUIDS:  900 mL of crystalloid. DRAINS:  None. FINDINGS:  Extensive omental adhesions in the midline near the umbilicus in the right upper quadrant of the abdomen, Ohco-Fgak-Bnnped appearance of adhesions on the liver, bowel adherent to both pelvic sidewalls, adhesions of the sigmoid colon on the left were carefully dissected to allow visualization of the left ovary and fallopian tube. The left ovary was approximately 5 cm in size with what appeared to be simple 3-cm ovarian cyst and had a normal smooth surface. The left fallopian tube appeared normal.  Both the ovary and fallopian tube adhesions to the left pelvic sidewall. The right adnexa was not explored as adhesions of the bowel on that side made visualization difficult and it was felt more potential for harm than good to examine that area. She had normal-appearing right ovary on ultrasound imaging. PROCEDURE:  The patient was taken to the operating room and positioned supine on the operating room table.   After adequate anesthesia was achieved via general endotracheal anesthesia, she had her legs positioned in the [facility]. Her vagina and perineum and abdomen were prepped and she was draped in a sterile fashion. After a time-out was performed, a sponge on a stick was inserted into the vagina to help with manipulation. Attention was turned to the abdominal portion of the procedure. A 5-mm incision was made at the umbilicus and entry with the Veress needle was attempted, however, opening pressure was noted to be high so it was felt abdominal entry may not have been obtained so direct optical entry was achieved without difficulty. Once the camera was inserted, it was noted that the trocar had been placed through omental adhesions. There was no bleeding noted. A 5-mm port was placed in the left lower quadrant under direct visualization. There were no abdominal adhesions on the upper left side or on the anterior wall of the abdomen on the left. The camera was then moved over to the left lower quadrant port and a 12 mm port was placed in the right lower quadrant under direct visualization, being careful to avoid the adhesions of the omentum on that anterior abdominal wall more in the right upper quadrant. The pelvis and abdomen were inspected with the findings noted above. The patient was placed in Trendelenburg position. Attention was turned to the left adnexal area. Blunt dissection was attempted to peel more filmy adhesions of the bowel off of the sidewall of the pelvis. The epiploica of the bowel was able to be manipulated to provide traction to identify the filmier aspects of the adhesions to the pelvic sidewall. These were then taken down using the Harmonic. This was carried out with great care to avoid using energy source near the bowel or grabbing the bowel with the grasper. Blunt dissection was utilized where possible using the blunt grasper to peel the bowel away as small gains were made in taking down adhesions. Ultimately, the ovary could be visualized.   On the left side, it was approximately 5 cm in size with what appeared to be a 3-cm simple-appearing ovarian cyst with a smooth surface. The left fallopian tube looked normal.  Both had adhesions to the left pelvic sidewall. The distal end of the tube was able to be grasped and brought medially to expose the adhesions which were taken down using the Harmonic device. The infundibulopelvic ligament was identified and cauterized using a bipolar [place], followed by a transection using the Harmonic. Additional adhesions along the ovarian edge to the pelvic sidewall were carefully dissected free using the Harmonic. Once the specimen was removed, it was placed in an EndoCatch bag and removed through the 12-mm port. The area of the left pelvic sidewall where dissection was performed and ovary removed was inspected. Hemostasis was good. Shaka powder was placed. The ureter was able to be visualized, peristalsing in the left lower quadrant. At this point, given the omental adhesions in the midline, we decided to close the fascia of the 12-mm port from above via direct visualization. All trocars were removed. Gas was allowed to escape from the abdomen prior to removing them. The fascia was visualized and elevated using Kocher clamps and 0 Vicryl suture was used in a running fashion to close the 12-mm defect and fascia in the right lower quadrant. Skin of the incision was then closed with 4-0 Monocryl, bandaged with Dermabond. All counts were correct at the end of the case. The sponge stick was removed from the vagina. There were no complications.       DO KELLEN Montiel/V_TRHIN_I/BC_CAD  D:  01/12/2022 10:56  T:  01/12/2022 16:09  JOB #:  [id number]  CC:  Joss Lemus DO

## 2022-03-04 ENCOUNTER — HOSPITAL ENCOUNTER (EMERGENCY)
Age: 47
Discharge: HOME | End: 2022-03-04
Attending: EMERGENCY MEDICINE
Payer: COMMERCIAL

## 2022-03-04 VITALS
DIASTOLIC BLOOD PRESSURE: 79 MMHG | BODY MASS INDEX: 26.21 KG/M2 | TEMPERATURE: 97.9 F | RESPIRATION RATE: 18 BRPM | HEIGHT: 67 IN | OXYGEN SATURATION: 100 % | HEART RATE: 76 BPM | SYSTOLIC BLOOD PRESSURE: 122 MMHG | WEIGHT: 167 LBS

## 2022-03-04 DIAGNOSIS — T78.40XA ALLERGIC REACTION, INITIAL ENCOUNTER: Primary | ICD-10-CM

## 2022-03-04 DIAGNOSIS — H57.89 IRRITATION OF RIGHT EYE: ICD-10-CM

## 2022-03-04 DIAGNOSIS — L29.9 ITCHING: ICD-10-CM

## 2022-03-04 PROCEDURE — 99283 EMERGENCY DEPT VISIT LOW MDM: CPT

## 2022-03-04 PROCEDURE — 74011000250 HC RX REV CODE- 250: Performed by: EMERGENCY MEDICINE

## 2022-03-04 PROCEDURE — 74011636637 HC RX REV CODE- 636/637: Performed by: EMERGENCY MEDICINE

## 2022-03-04 RX ORDER — PREDNISONE 20 MG/1
40 TABLET ORAL
Status: COMPLETED | OUTPATIENT
Start: 2022-03-04 | End: 2022-03-04

## 2022-03-04 RX ORDER — TETRACAINE HYDROCHLORIDE 5 MG/ML
1 SOLUTION OPHTHALMIC
Status: COMPLETED | OUTPATIENT
Start: 2022-03-04 | End: 2022-03-04

## 2022-03-04 RX ORDER — METHYLPREDNISOLONE 4 MG/1
TABLET ORAL
Qty: 1 DOSE PACK | Refills: 0 | Status: SHIPPED | OUTPATIENT
Start: 2022-03-04 | End: 2022-04-21 | Stop reason: ALTCHOICE

## 2022-03-04 RX ORDER — ERYTHROMYCIN 5 MG/G
OINTMENT OPHTHALMIC
Qty: 1 G | Refills: 0 | Status: SHIPPED | OUTPATIENT
Start: 2022-03-04 | End: 2022-03-11

## 2022-03-04 RX ADMIN — PREDNISONE 40 MG: 20 TABLET ORAL at 14:29

## 2022-03-04 RX ADMIN — FLUORESCEIN SODIUM 1 STRIP: 1 STRIP OPHTHALMIC at 14:29

## 2022-03-04 RX ADMIN — TETRACAINE HYDROCHLORIDE 1 DROP: 5 SOLUTION OPHTHALMIC at 14:29

## 2022-03-04 NOTE — ED PROVIDER NOTES
EMERGENCY DEPARTMENT HISTORY AND PHYSICAL EXAM      Date: 3/4/2022  Patient Name: Mony Rodriguez    History of Presenting Illness     Chief Complaint   Patient presents with    Skin Problem       History Provided By: Patient    HPI: Mony Rodriguez, 42 y.o. female with a past medical history significant No significant past medical history presents to the ED with cc of itching all over and left eye irritation. Pt states she was leaf blowing two days ago. She didn't notice anything going into her eye but she developed some swelling of her eyelid and mild tenderness of her outer lower lid. She also developed itching all over without a rash. She took Benadryl before coming today and It has helped some. She does have some seasonal allergies but hasn't had this happen before. She denies any blurred vision, pain with eye movement or drainage. She denies throat, lip or facial swelling. There are no other complaints, changes, or physical findings at this time. PCP: Corrina Yeager, DO    No current facility-administered medications on file prior to encounter. Current Outpatient Medications on File Prior to Encounter   Medication Sig Dispense Refill    metFORMIN (GLUCOPHAGE) 500 mg tablet Take 500 mg by mouth two (2) times daily (with meals).  DULoxetine (Cymbalta) 60 mg capsule Take 60 mg by mouth nightly.  SUMAtriptan (IMITREX) 50 mg tablet Take 50 mg by mouth once as needed for Migraine.  ALPRAZolam (Xanax) 1 mg tablet Take 0.5 mg by mouth daily as needed.          Past History     Past Medical History:  Past Medical History:   Diagnosis Date    Anxiety     Diabetes ([facility] [address]     gestational    Fibromyalgia     Currently seeing a Neurologist to confirm diagnosis    Ill-defined condition     Patient reports increased WBC's- has appointment with Oncologist    Migraines        Past Surgical History:  Past Surgical History:   Procedure Laterality Date    HX CHOLECYSTECTOMY      HX GYN hysterectomy    HX ORTHOPAEDIC      toe    HX TUBAL LIGATION      HX WISDOM TEETH EXTRACTION Bilateral     Bottoms only       Family History:  Family History   Problem Relation Age of Onset    Lupus Mother     Lupus Sister     Coronary Art Dis Other     Heart Disease Other         MI    Diabetes Other     Lung Cancer Other     Breast Cancer Maternal Aunt     Stomach Cancer Maternal Grandfather     Prostate Cancer Paternal Grandfather        Social History:  Social History     Tobacco Use    Smoking status: Current Every Day Smoker     Packs/day: 0.50     Years: 15.00     Pack years: 7.50    Smokeless tobacco: Never Used   Vaping Use    Vaping Use: Some days    Substances: Nicotine   Substance Use Topics    Alcohol use: Yes     Comment: Rarely    Drug use: Never       Allergies: Allergies   Allergen Reactions    Sulfa (Sulfonamide Antibiotics) Unable to Obtain     Migraine Headache    Vancomycin Other (comments)     Red streaks at injection site.  Zoloft [Sertraline] Unable to Obtain     Anxious and passed out         Review of Systems     Review of Systems   Constitutional: Negative for activity change, appetite change, fatigue and fever. HENT: Negative. Negative for congestion, rhinorrhea and sore throat. Eyes: Negative for photophobia, discharge, redness and visual disturbance. Respiratory: Negative. Negative for cough, shortness of breath and wheezing. Cardiovascular: Negative. Negative for chest pain and leg swelling. Gastrointestinal: Negative. Negative for abdominal distention, abdominal pain, constipation, diarrhea, nausea and vomiting. Endocrine: Negative. Genitourinary: Negative for difficulty urinating, dysuria, menstrual problem, vaginal bleeding and vaginal discharge. Musculoskeletal: Negative. Negative for arthralgias, joint swelling and myalgias. Skin: Negative. Negative for rash. itching   Neurological: Negative.   Negative for dizziness, weakness, light-headedness and headaches. Psychiatric/Behavioral: Negative. Physical Exam     Physical Exam  Vitals and nursing note reviewed. Constitutional:       General: She is not in acute distress. HENT:      Head: Atraumatic. Eyes:      General:         Right eye: No discharge. Left eye: No discharge. Extraocular Movements: Extraocular movements intact. Pupils: Pupils are equal, round, and reactive to light. Comments: Righ tlateral, lower inner lid erythematous  No swelling, no stye  Eyelid inversion did not show foreign body   Cardiovascular:      Rate and Rhythm: Normal rate and regular rhythm. Heart sounds: Normal heart sounds. No murmur heard. Pulmonary:      Effort: Pulmonary effort is normal. No respiratory distress. Breath sounds: Normal breath sounds. No wheezing or rales. Chest:      Chest wall: No tenderness. Abdominal:      General: Bowel sounds are normal. There is no distension. Palpations: Abdomen is soft. There is no mass. Tenderness: There is no abdominal tenderness. There is no guarding or rebound. Musculoskeletal:         General: Normal range of motion. Cervical back: Normal range of motion. Skin:     General: Skin is warm. Comments: dermatographia   Neurological:      Mental Status: She is alert and oriented to person, place, and time. Cranial Nerves: No cranial nerve deficit. Lab and Diagnostic Study Results     Labs -   No results found for this or any previous visit (from the past 12 hour(s)). Radiologic Studies -   @lastxrresult@  CT Results  (Last 48 hours)    None        CXR Results  (Last 48 hours)    None            Medical Decision Making   - I am the first provider for this patient. - I reviewed the vital signs, available nursing notes, past medical history, past surgical history, family history and social history. - Initial assessment performed.  The patients presenting problems have been discussed, and they are in agreement with the care plan formulated and outlined with them. I have encouraged them to ask questions as they arise throughout their visit. Vital Signs-Reviewed the patient's vital signs. Patient Vitals for the past 12 hrs:   Temp Pulse Resp BP SpO2   03/04/22 1326 97.9 °F (36.6 °C) -- -- -- --   03/04/22 1325 -- 76 18 122/79 100 %       Records Reviewed: Nursing Notes    The patient presents with itching with a differential diagnosis of allergic reaction with possible foreign body or irritant vs corneal abrasion right eye. ED Course:          Provider Notes (Medical Decision Making):   *MDM   Will irrigate eye then use Fluorscein stain woods lamp to r/o corneal abrasion or ulcer. Will provide steroids for systemic allergice reaction. Procedures   Medical Decision Makingedical Decision Making  Performed by: Yasir Montalvo MD  PROCEDURES:*  Procedures     Procedure Note - Wood's lamp exam:  7740 PM  Performed by: Dr Cassi Gomez  Pts right eye was anesthetized with tetracaine, stained with fluorescein, and examined with a Wood's lamp, using lid eversion. Foreign body: no  Fluorescein uptake: no,   The procedure took 1-15 minutes, and pt tolerated well. Disposition   Disposition: Condition improved    Discharged    DISCHARGE PLAN:  1. Cannot display discharge medications since this patient is not currently admitted. 2.   Follow-up Information     Follow up With Specialties Details Why [address] [place] [address]  Schedule an appointment as soon as possible for a visit   [address] [address]        3. Return to ED if worse   4.    Discharge Medication List as of 3/4/2022  3:26 PM      START taking these medications    Details   methylPREDNISolone (Medrol, Jason,) 4 mg tablet Take as directed, Normal, Disp-1 Dose Pack, R-0      erythromycin (ILOTYCIN) ophthalmic ointment Apply half inch to affected eye (pull down eyelid and apply thin line across approx half inch), Normal, Disp-1 g, R-0         CONTINUE these medications which have NOT CHANGED    Details   metFORMIN (GLUCOPHAGE) 500 mg tablet Take 500 mg by mouth two (2) times daily (with meals). , Historical Med      DULoxetine (Cymbalta) 60 mg capsule Take 60 mg by mouth nightly., Historical Med      SUMAtriptan (IMITREX) 50 mg tablet Take 50 mg by mouth once as needed for Migraine., Historical Med      ALPRAZolam (Xanax) 1 mg tablet Take 0.5 mg by mouth daily as needed., Historical Med               Diagnosis     Clinical Impression:   1. Allergic reaction, initial encounter    2. Itching    3. Irritation of right eye        Attestations:    Rosalinda Mccarthy MD    Please note that this dictation was completed with Spotistic, the Cardize voice recognition software. Quite often unanticipated grammatical, syntax, homophones, and other interpretive errors are inadvertently transcribed by the computer software. Please disregard these errors. Please excuse any errors that have escaped final proofreading. Thank you.

## 2022-03-04 NOTE — Clinical Note
[address] [facility] EMERGENCY DEPARTMENT  [address] [facility] [address]  [phone]    Work/School Note    Date: 3/4/2022    To Whom It May concern:      Valeria Perez was seen and treated today in the emergency room by the following provider(s):  Attending Provider: Isabell Louis MD.      Valeria Perez is excused from work/school on 03/04/22. She is clear to return to work/school on 03/05/22.         Sincerely,          Dhruv Mendosa MD

## 2022-03-18 PROBLEM — R10.2 PELVIC PAIN: Status: ACTIVE | Noted: 2022-01-12

## 2022-03-19 PROBLEM — R92.8 ABNORMAL MAMMOGRAM: Status: ACTIVE | Noted: 2020-07-30

## 2022-03-20 PROBLEM — N83.8 OVARIAN MASS, LEFT: Status: ACTIVE | Noted: 2022-01-12

## 2022-04-06 ENCOUNTER — OFFICE VISIT (OUTPATIENT)
Dept: PODIATRY | Age: 47
End: 2022-04-06
Payer: COMMERCIAL

## 2022-04-06 VITALS
OXYGEN SATURATION: 99 % | WEIGHT: 173 LBS | HEIGHT: 67 IN | HEART RATE: 89 BPM | BODY MASS INDEX: 27.15 KG/M2 | SYSTOLIC BLOOD PRESSURE: 127 MMHG | DIASTOLIC BLOOD PRESSURE: 76 MMHG | TEMPERATURE: 97.8 F

## 2022-04-06 DIAGNOSIS — L85.3 XEROSIS CUTIS: ICD-10-CM

## 2022-04-06 DIAGNOSIS — E11.42 TYPE 2 DIABETES MELLITUS WITH DIABETIC POLYNEUROPATHY, WITHOUT LONG-TERM CURRENT USE OF INSULIN (HCC): ICD-10-CM

## 2022-04-06 DIAGNOSIS — B35.3 TINEA PEDIS OF BOTH FEET: ICD-10-CM

## 2022-04-06 DIAGNOSIS — M79.674 PAIN OF TOE OF RIGHT FOOT: Primary | ICD-10-CM

## 2022-04-06 PROCEDURE — 99204 OFFICE O/P NEW MOD 45 MIN: CPT | Performed by: PODIATRIST

## 2022-04-06 RX ORDER — CLOTRIMAZOLE AND BETAMETHASONE DIPROPIONATE 10; .64 MG/G; MG/G
CREAM TOPICAL 2 TIMES DAILY
Qty: 45 G | Refills: 0 | Status: SHIPPED | OUTPATIENT
Start: 2022-04-06

## 2022-04-06 RX ORDER — AMMONIUM LACTATE 12 G/100G
CREAM TOPICAL 2 TIMES DAILY
Qty: 280 G | Refills: 2 | Status: SHIPPED | OUTPATIENT
Start: 2022-04-06

## 2022-04-06 NOTE — PROGRESS NOTES
1. Have you been to the ER, urgent care clinic since your last visit? Hospitalized since your last visit? Yes Where: [facility]    2. Have you seen or consulted any other health care providers outside of the [address] [place] [address] since your last visit? Include any pap smears or colon screening.  No     Chief Complaint   Patient presents with    Foot Swelling     toes are swelling and itching

## 2022-04-21 ENCOUNTER — OFFICE VISIT (OUTPATIENT)
Dept: NEUROLOGY | Age: 47
End: 2022-04-21
Payer: COMMERCIAL

## 2022-04-21 VITALS
DIASTOLIC BLOOD PRESSURE: 86 MMHG | HEART RATE: 69 BPM | SYSTOLIC BLOOD PRESSURE: 124 MMHG | OXYGEN SATURATION: 99 % | RESPIRATION RATE: 16 BRPM

## 2022-04-21 DIAGNOSIS — R29.818 TRANSIENT NEUROLOGICAL SYMPTOMS: Primary | ICD-10-CM

## 2022-04-21 PROCEDURE — 99204 OFFICE O/P NEW MOD 45 MIN: CPT | Performed by: SPECIALIST

## 2022-04-21 NOTE — PATIENT INSTRUCTIONS
History noted patient examined. Would like to get the formal radiological report on the brain MRI and will order some labs and pend revisit until we can get additional information at this point in time. Further suggestions could follow.

## 2022-04-21 NOTE — LETTER
4/21/2022    Patient: Cherylene Redman   YOB: 1975   Date of Visit: 4/21/2022     Carl Snyder, [place] [address] [place] [id number]  Via Fax: [phone]    Dear Barry Laird,      Thank you for referring Ms. Marcy Eid to [address] [place] [address] for evaluation. My notes for this consultation are attached. If you have questions, please do not hesitate to call me. I look forward to following your patient along with you.       Sincerely,    Daniela Craig MD

## 2022-04-21 NOTE — PROGRESS NOTES
Neurology Consult      Subjective:      Rafal Nguyen is a 42 y.o. female who comes in today with the following history. Said 3 years ago she was running and felt a prompt right shin pain. And it did not happen again in isolation. She occasionally get electrical pulses in her bed back and have a dizziness sensation? This would come and go as a still does maybe 3-4 times a month and lasting seconds? Interestingly in the course of her conversation she spontaneously offered she will get this random change of colors in the room visually and in almost loss of consciousness? But does not pass out. Did reference within the last 6 years a minutes worth of loss of consciousness and went to [facility] ER and was told she had a UTI? She has had a total of 8-9 of the spells? She ended up with [facility] but the LP apparently was complicated and was told that the samples from the spinal fluid were insufficient or not reliable? Was going to get a call back from the physician but never did. More recently has noticed that she gets pain all over. Does smoke but is cutting down and alcohol is seldom. Denies any affiliation with THC or street drugs etc.  Does have background diabetes and fibromyalgia and migraines. Current Outpatient Medications   Medication Sig Dispense Refill    clotrimazole-betamethasone (LOTRISONE) topical cream Apply  to affected area two (2) times a day. 45 g 0    ammonium lactate (AMLACTIN) 12 % topical cream Apply  to affected area two (2) times a day. rub in to affected area well 280 g 2    metFORMIN (GLUCOPHAGE) 500 mg tablet Take 500 mg by mouth two (2) times daily (with meals).  DULoxetine (Cymbalta) 60 mg capsule Take 60 mg by mouth nightly.  SUMAtriptan (IMITREX) 50 mg tablet Take 50 mg by mouth once as needed for Migraine.       ALPRAZolam (Xanax) 1 mg tablet Take 0.5 mg by mouth daily as needed. Allergies   Allergen Reactions    Sulfa (Sulfonamide Antibiotics) Unable to Obtain     Migraine Headache    Vancomycin Other (comments)     Red streaks at injection site.  Zoloft [Sertraline] Unable to Obtain     Anxious and passed out     Past Medical History:   Diagnosis Date    Anxiety     Diabetes ([facility] [address]     gestational    Fibromyalgia     Currently seeing a Neurologist to confirm diagnosis    Ill-defined condition     Patient reports increased WBC's- has appointment with Oncologist    Migraines       Past Surgical History:   Procedure Laterality Date    HX CHOLECYSTECTOMY      HX GYN      hysterectomy    HX ORTHOPAEDIC      toe    HX TUBAL LIGATION      HX WISDOM TEETH EXTRACTION Bilateral     Bottoms only      Social History     Socioeconomic History    Marital status:      Spouse name: Not on file    Number of children: Not on file    Years of education: Not on file    Highest education level: Not on file   Occupational History    Not on file   Tobacco Use    Smoking status: Current Every Day Smoker     Packs/day: 0.50     Years: 15.00     Pack years: 7.50    Smokeless tobacco: Never Used   Vaping Use    Vaping Use: Some days    Substances: Nicotine   Substance and Sexual Activity    Alcohol use: Yes     Comment: Rarely    Drug use: Never    Sexual activity: Not on file   Other Topics Concern    Not on file   Social History Narrative    Not on file     Social Determinants of Health     Financial Resource Strain:     Difficulty of Paying Living Expenses: Not on file   Food Insecurity:     Worried About Running Out of Food in the Last Year: Not on file    Rosio of Food in the Last Year: Not on file   Transportation Needs:     Lack of Transportation (Medical): Not on file    Lack of Transportation (Non-Medical):  Not on file   Physical Activity:     Days of Exercise per Week: Not on file    Minutes of Exercise per Session: Not on file   Stress:     Feeling of Stress : Not on file   Social Connections:     Frequency of Communication with Friends and Family: Not on file    Frequency of Social Gatherings with Friends and Family: Not on file    Attends Islam Services: Not on file    Active Member of Clubs or Organizations: Not on file    Attends Club or Organization Meetings: Not on file    Marital Status: Not on file   Intimate Partner Violence:     Fear of Current or Ex-Partner: Not on file    Emotionally Abused: Not on file    Physically Abused: Not on file    Sexually Abused: Not on file   Housing Stability:     Unable to Pay for Housing in the Last Year: Not on file    Number of [place] in the Last Year: Not on file    Unstable Housing in the Last Year: Not on file      Family History   Problem Relation Age of Onset    Lupus Mother     Lupus Sister     Coronary Art Dis Other     Heart Disease Other         MI    Diabetes Other     Lung Cancer Other     Breast Cancer Maternal Aunt     Stomach Cancer Maternal Grandfather     Prostate Cancer Paternal Grandfather       Visit Vitals  /86 (BP 1 Location: Left arm, BP Patient Position: Sitting, BP Cuff Size: Adult)   Pulse 69   Resp 16   SpO2 99%        Review of Systems:   A comprehensive review of systems was negative except for that written in the HPI. Neuro Exam:     Appearance: The patient is well developed, well nourished, provides a coherent history and is in no acute distress. Mental Status: Oriented to time, place and person. Mood and affect appropriate. Cranial Nerves:   Intact visual fields. Fundi are benign. MARC, EOM's full, no nystagmus, no ptosis. Facial sensation is normal. Corneal reflexes are intact. Facial movement is symmetric. Hearing is normal bilaterally. Palate is midline with normal sternocleidomastoid and trapezius muscles are normal. Tongue is midline. Motor:  5/5 strength in upper and lower proximal and distal muscles.  Normal bulk and tone. No fasciculations. Reflexes:   Deep tendon reflexes 2+/4 and symmetrical.   Sensory:   Normal to touch, pinprick and vibration. Gait:   Gait shows patient with a stiffness on right foot approach step to step. Romberg negative. On toes on heels was off balance as was independently extending in tandem. Did slightly improve with repetition. Tremor:   No tremor noted. Cerebellar:  No cerebellar signs present. Neurovascular:  Normal heart sounds and regular rhythm, peripheral pulses intact, and no carotid bruits. Toes equivocal on the right downgoing on the left- positive clonus both feet. Straight leg raising -90 degrees. Lhermitte's negative. No Echevarria's. Assessment:   Transient neurologic symptoms. Very interesting chronological history and would like to get the official brain MRI report from [facility].  We will also initiate some labs. Further suggestions could follow. This includes the possibility of formal imaging of neck and spine itself. Plan:   Revisit pended.   Signed by :  Sabrina Smith MD

## 2022-04-23 LAB
ALBUMIN SERPL-MCNC: 4.4 G/DL (ref 3.8–4.8)
ALBUMIN/GLOB SERPL: 1.6 {RATIO} (ref 1.2–2.2)
ALP SERPL-CCNC: 78 IU/L (ref 44–121)
ALT SERPL-CCNC: 21 IU/L (ref 0–32)
ANA SER QL: NEGATIVE
AST SERPL-CCNC: 20 IU/L (ref 0–40)
BASOPHILS # BLD AUTO: 0.1 X10E3/UL (ref 0–0.2)
BASOPHILS NFR BLD AUTO: 0 %
BILIRUB SERPL-MCNC: 0.3 MG/DL (ref 0–1.2)
BUN SERPL-MCNC: 5 MG/DL (ref 6–24)
BUN/CREAT SERPL: 8 (ref 9–23)
CALCIUM SERPL-MCNC: 9.9 MG/DL (ref 8.7–10.2)
CHLORIDE SERPL-SCNC: 102 MMOL/L (ref 96–106)
CO2 SERPL-SCNC: 20 MMOL/L (ref 20–29)
CREAT SERPL-MCNC: 0.61 MG/DL (ref 0.57–1)
EGFR: 113 ML/MIN/1.73
EOSINOPHIL # BLD AUTO: 0.4 X10E3/UL (ref 0–0.4)
EOSINOPHIL NFR BLD AUTO: 3 %
ERYTHROCYTE [DISTWIDTH] IN BLOOD BY AUTOMATED COUNT: 12.8 % (ref 11.7–15.4)
ERYTHROCYTE [SEDIMENTATION RATE] IN BLOOD BY WESTERGREN METHOD: 2 MM/HR (ref 0–32)
FOLATE SERPL-MCNC: 4.3 NG/ML
GLOBULIN SER CALC-MCNC: 2.8 G/DL (ref 1.5–4.5)
GLUCOSE SERPL-MCNC: 82 MG/DL (ref 65–99)
HCT VFR BLD AUTO: 43.9 % (ref 34–46.6)
HGB BLD-MCNC: 14.5 G/DL (ref 11.1–15.9)
IMM GRANULOCYTES # BLD AUTO: 0 X10E3/UL (ref 0–0.1)
IMM GRANULOCYTES NFR BLD AUTO: 0 %
LYMPHOCYTES # BLD AUTO: 4 X10E3/UL (ref 0.7–3.1)
LYMPHOCYTES NFR BLD AUTO: 31 %
MCH RBC QN AUTO: 30.1 PG (ref 26.6–33)
MCHC RBC AUTO-ENTMCNC: 33 G/DL (ref 31.5–35.7)
MCV RBC AUTO: 91 FL (ref 79–97)
MONOCYTES # BLD AUTO: 0.6 X10E3/UL (ref 0.1–0.9)
MONOCYTES NFR BLD AUTO: 5 %
NEUTROPHILS # BLD AUTO: 8 X10E3/UL (ref 1.4–7)
NEUTROPHILS NFR BLD AUTO: 61 %
PLATELET # BLD AUTO: 308 X10E3/UL (ref 150–450)
POTASSIUM SERPL-SCNC: 4.2 MMOL/L (ref 3.5–5.2)
PROT SERPL-MCNC: 7.2 G/DL (ref 6–8.5)
RBC # BLD AUTO: 4.81 X10E6/UL (ref 3.77–5.28)
SODIUM SERPL-SCNC: 141 MMOL/L (ref 134–144)
TSH SERPL DL<=0.005 MIU/L-ACNC: 2.86 UIU/ML (ref 0.45–4.5)
VIT B12 SERPL-MCNC: 299 PG/ML (ref 232–1245)
WBC # BLD AUTO: 13.1 X10E3/UL (ref 3.4–10.8)

## 2022-04-26 DIAGNOSIS — M79.674 PAIN OF TOE OF RIGHT FOOT: ICD-10-CM

## 2022-05-06 NOTE — PROGRESS NOTES
[facility]    Subjective:         Patient is a 42 y.o. female who is being seen as a new pt for B/L foot pain and an itchy rash to the toes. Pt states she has been suffering with the pain in her feet for the past few weeks. She denies any trauma. States the pain rises to the level of 3/10. She states she does have any itchy rash, recalcitrant to OTC meds, to her toes. She denies any recent changes in her activity level or shoes gear. She denies any recent diagnostic testing. She denies any other pedal complaints    Past Medical History:   Diagnosis Date    Anxiety     Diabetes ([facility] [address]     gestational    Fibromyalgia     Currently seeing a Neurologist to confirm diagnosis    Ill-defined condition     Patient reports increased WBC's- has appointment with Oncologist    Migraines      Past Surgical History:   Procedure Laterality Date    HX CHOLECYSTECTOMY      HX GYN      hysterectomy    HX ORTHOPAEDIC      toe    HX TUBAL LIGATION      HX WISDOM TEETH EXTRACTION Bilateral     Bottoms only       Family History   Problem Relation Age of Onset    Lupus Mother     Lupus Sister     Coronary Art Dis Other     Heart Disease Other         MI    Diabetes Other     Lung Cancer Other     Breast Cancer Maternal Aunt     Stomach Cancer Maternal Grandfather     Prostate Cancer Paternal Grandfather       Social History     Tobacco Use    Smoking status: Current Every Day Smoker     Packs/day: 0.50     Years: 15.00     Pack years: 7.50    Smokeless tobacco: Never Used   Substance Use Topics    Alcohol use: Yes     Comment: Rarely     Allergies   Allergen Reactions    Sulfa (Sulfonamide Antibiotics) Unable to Obtain     Migraine Headache    Vancomycin Other (comments)     Red streaks at injection site.  Zoloft [Sertraline] Unable to Obtain     Anxious and passed out     Prior to Admission medications    Medication Sig Start Date End Date Taking?  Authorizing Provider clotrimazole-betamethasone (LOTRISONE) topical cream Apply  to affected area two (2) times a day. 4/6/22  Yes Shemarpau Sarina Hughes, DPM   ammonium lactate (AMLACTIN) 12 % topical cream Apply  to affected area two (2) times a day. rub in to affected area well 4/6/22  Yes Ane Severance, DPM   metFORMIN (GLUCOPHAGE) 500 mg tablet Take 500 mg by mouth two (2) times daily (with meals). Yes Provider, Historical   DULoxetine (Cymbalta) 60 mg capsule Take 60 mg by mouth nightly. Yes Provider, Historical   SUMAtriptan (IMITREX) 50 mg tablet Take 50 mg by mouth once as needed for Migraine. Yes Provider, Historical   ALPRAZolam (Xanax) 1 mg tablet Take 0.5 mg by mouth daily as needed. Yes Provider, Historical       Review of Systems   Constitutional: Negative. HENT: Negative. Eyes: Negative. Respiratory: Negative. Cardiovascular: Negative. Gastrointestinal: Negative. Endocrine: Negative. Genitourinary: Negative. Musculoskeletal: Positive for arthralgias. Skin: Negative. Allergic/Immunologic: Positive for immunocompromised state. Neurological: Positive for numbness. Hematological: Negative. Psychiatric/Behavioral: The patient is nervous/anxious. All other systems reviewed and are negative. Objective:     Visit Vitals  /76 (BP 1 Location: Left upper arm, BP Patient Position: Sitting, BP Cuff Size: Adult)   Pulse 89   Temp 97.8 °F (36.6 °C) (Temporal)   Ht 5' 7\" (1.702 m)   Wt 173 lb (78.5 kg)   SpO2 99%   BMI 27.10 kg/m²       Physical Exam  Vitals reviewed. Constitutional:       Appearance: She is overweight. Cardiovascular:      Pulses:           Dorsalis pedis pulses are 2+ on the right side and 2+ on the left side. Posterior tibial pulses are 2+ on the right side and 2+ on the left side. Pulmonary:      Effort: Pulmonary effort is normal.   Musculoskeletal:      Right lower leg: No edema. Left lower leg: No edema.       Right foot: Decreased range of motion. Deformity present. No Charcot foot or foot drop. Left foot: Normal range of motion. No deformity, Charcot foot or foot drop. Feet:      Right foot:      Protective Sensation: 10 sites tested. 10 sites sensed. Skin integrity: Dry skin present. Toenail Condition: Right toenails are normal.      Left foot:      Protective Sensation: 10 sites tested. 10 sites sensed. Skin integrity: Dry skin present. Toenail Condition: Left toenails are normal.   Lymphadenopathy:      Lower Body: No right inguinal adenopathy. No left inguinal adenopathy. Skin:     General: Skin is warm. Capillary Refill: Capillary refill takes 2 to 3 seconds. Findings: Rash present. Neurological:      Mental Status: She is alert and oriented to person, place, and time. Psychiatric:         Mood and Affect: Mood and affect normal.         Behavior: Behavior is cooperative. Data Review: No results found for this or any previous visit (from the past 24 hour(s)). Impression:       ICD-10-CM ICD-9-CM    1. Pain of toe of right foot  M79.674 729.5 XR FOOT RT MIN 3 V   2. Tinea pedis of both feet  B35.3 110.4    3. Xerosis cutis  L85.3 706.8    4. Type 2 diabetes mellitus with diabetic polyneuropathy, without long-term current use of insulin ([facility])  E11.42 250.60      357.2        Recommendation:     Patient seen and evaluated in the office  Discussed and educated patient regarding their current medical condition at it pertains to her diabetes and her overall pedal health. Instructed patient to monitor their feet daily for possible wound formation, be compliant with all medications and wear supportive shoe gear for wound prevention. Reviewed and discussed most recent lab work, specifically as it pertains to her diabetes.  Pt verbalized understanding of all discussed and reviewed  Rx for XR to eval the pain in the toes of her right foot  Topical antifungal/steroid cream to be applied BID for 2 weeks for her tinea pedis  Ammonium lactate 12% cream to be applied BID for her xerotic skin        Tee Godoy, [facility], [address] [place] [address] and Mike [address] [place] [address] [address], [address]  O: [phone]  F: [phone]  C: [phone]

## 2022-05-24 ENCOUNTER — OFFICE VISIT (OUTPATIENT)
Dept: PODIATRY | Age: 47
End: 2022-05-24
Payer: COMMERCIAL

## 2022-05-24 VITALS
WEIGHT: 173 LBS | TEMPERATURE: 97.3 F | BODY MASS INDEX: 27.15 KG/M2 | DIASTOLIC BLOOD PRESSURE: 73 MMHG | HEIGHT: 67 IN | SYSTOLIC BLOOD PRESSURE: 119 MMHG | HEART RATE: 73 BPM

## 2022-05-24 DIAGNOSIS — D49.2 SOFT TISSUE TUMOR OF RIGHT FOOT: Primary | ICD-10-CM

## 2022-05-24 PROCEDURE — 99214 OFFICE O/P EST MOD 30 MIN: CPT | Performed by: PODIATRIST

## 2022-05-24 NOTE — PROGRESS NOTES
Chief Complaint   Patient presents with    Results     xr results     1. Have you been to the ER, urgent care clinic since your last visit? Hospitalized since your last visit? No    2. Have you seen or consulted any other health care providers outside of the [address] [place] [address] since your last visit? Include any pap smears or colon screening.  No  PCP-Dr Adame Rx

## 2022-05-24 NOTE — PROGRESS NOTES
[facility]    Subjective:         Patient is a 42 y.o. female who is being seen as returning patient for continued mass formation to the right 4th toe. Patient states she has gone for her x ray and would like to review the findings. She cont to deny any trauma. Denies any pain at this time. Denies any breaks in the skin/local/systemic signs of infection. She denies any other pedal complaints      Past Medical History:   Diagnosis Date    Anxiety     Diabetes ([facility] [address]     gestational    Fibromyalgia     Currently seeing a Neurologist to confirm diagnosis    Ill-defined condition     Patient reports increased WBC's- has appointment with Oncologist    Migraines      Past Surgical History:   Procedure Laterality Date    HX CHOLECYSTECTOMY      HX GYN      hysterectomy    HX ORTHOPAEDIC      toe    HX TUBAL LIGATION      HX WISDOM TEETH EXTRACTION Bilateral     Bottoms only       Family History   Problem Relation Age of Onset    Lupus Mother     Lupus Sister     Coronary Art Dis Other     Heart Disease Other         MI    Diabetes Other     Lung Cancer Other     Breast Cancer Maternal Aunt     Stomach Cancer Maternal Grandfather     Prostate Cancer Paternal Grandfather       Social History     Tobacco Use    Smoking status: Current Every Day Smoker     Packs/day: 0.50     Years: 15.00     Pack years: 7.50    Smokeless tobacco: Never Used   Substance Use Topics    Alcohol use: Yes     Comment: Rarely     Allergies   Allergen Reactions    Sulfa (Sulfonamide Antibiotics) Unable to Obtain     Migraine Headache    Vancomycin Other (comments)     Red streaks at injection site.  Zoloft [Sertraline] Unable to Obtain     Anxious and passed out     Prior to Admission medications    Medication Sig Start Date End Date Taking? Authorizing Provider   clotrimazole-betamethasone (LOTRISONE) topical cream Apply  to affected area two (2) times a day.  4/6/22   Ane Severance, DPM ammonium lactate (AMLACTIN) 12 % topical cream Apply  to affected area two (2) times a day. rub in to affected area well 4/6/22   Sumit Patino DPM   metFORMIN (GLUCOPHAGE) 500 mg tablet Take 500 mg by mouth two (2) times daily (with meals). Provider, Historical   DULoxetine (Cymbalta) 60 mg capsule Take 60 mg by mouth nightly. Provider, Historical   SUMAtriptan (IMITREX) 50 mg tablet Take 50 mg by mouth once as needed for Migraine. Provider, Historical   ALPRAZolam (Xanax) 1 mg tablet Take 0.5 mg by mouth daily as needed. Provider, Historical       Review of Systems   Constitutional: Negative. HENT: Negative. Eyes: Negative. Respiratory: Negative. Cardiovascular: Negative. Gastrointestinal: Negative. Endocrine: Negative. Genitourinary: Negative. Musculoskeletal: Positive for arthralgias. Skin: Negative. Allergic/Immunologic: Positive for immunocompromised state. Neurological: Positive for numbness. Hematological: Negative. Psychiatric/Behavioral: The patient is nervous/anxious. All other systems reviewed and are negative. Objective:     Visit Vitals  /73 (BP 1 Location: Right upper arm, BP Patient Position: Sitting, BP Cuff Size: Large adult)   Pulse 73   Temp 97.3 °F (36.3 °C)   Ht 5' 7\" (1.702 m)   Wt 173 lb (78.5 kg)   BMI 27.10 kg/m²       Physical Exam  Vitals reviewed. Constitutional:       Appearance: She is overweight. Cardiovascular:      Pulses:           Dorsalis pedis pulses are 2+ on the right side and 2+ on the left side. Posterior tibial pulses are 2+ on the right side and 2+ on the left side. Pulmonary:      Effort: Pulmonary effort is normal.   Musculoskeletal:      Right lower leg: No edema. Left lower leg: No edema. Right foot: Decreased range of motion. Deformity present. No Charcot foot or foot drop. Left foot: Normal range of motion. No deformity, Charcot foot or foot drop.    Feet:      Right foot:      Protective Sensation: 10 sites tested. 10 sites sensed. Skin integrity: Dry skin present. Toenail Condition: Right toenails are normal.      Left foot:      Protective Sensation: 10 sites tested. 10 sites sensed. Skin integrity: Dry skin present. Toenail Condition: Left toenails are normal.   Lymphadenopathy:      Lower Body: No right inguinal adenopathy. No left inguinal adenopathy. Skin:     General: Skin is warm. Capillary Refill: Capillary refill takes 2 to 3 seconds. Findings: Rash present. Neurological:      Mental Status: She is alert and oriented to person, place, and time. Psychiatric:         Mood and Affect: Mood and affect normal.         Behavior: Behavior is cooperative. Data Review:         Impression:       ICD-10-CM ICD-9-CM    1. Soft tissue tumor of right foot  D49.2 239.2          Recommendation:     Patient seen and evaluated in the office  Personally reviewed x-ray images of the right foot and discussed findings with patient. Treatment options reviewed, conservative or surgical.  Possible complications of each discussed. Patient elected for surgical intervention at this time. In-depth conversation had regarding preoperative, intraoperative and postoperative surgical protocols. Patient given a form to be completed by her PCP for preoperative surgical clearance        eTe Briggs, [facility], [address] [place] [address] and Mike  Surgery  [address] [place] [address]  O: [phone]  F: [phone]  C: [phone]

## 2022-06-13 RX ORDER — BACLOFEN 10 MG/1
TABLET ORAL 3 TIMES DAILY
COMMUNITY

## 2022-06-17 ENCOUNTER — ANESTHESIA (OUTPATIENT)
Dept: SURGERY | Age: 47
End: 2022-06-17
Payer: COMMERCIAL

## 2022-06-17 ENCOUNTER — HOSPITAL ENCOUNTER (OUTPATIENT)
Age: 47
Setting detail: OUTPATIENT SURGERY
Discharge: HOME | End: 2022-06-17
Attending: PODIATRIST | Admitting: INTERNAL MEDICINE
Payer: COMMERCIAL

## 2022-06-17 ENCOUNTER — ANESTHESIA EVENT (OUTPATIENT)
Dept: SURGERY | Age: 47
End: 2022-06-17
Payer: COMMERCIAL

## 2022-06-17 VITALS
HEIGHT: 67 IN | HEART RATE: 67 BPM | WEIGHT: 172 LBS | TEMPERATURE: 98.1 F | DIASTOLIC BLOOD PRESSURE: 75 MMHG | OXYGEN SATURATION: 99 % | SYSTOLIC BLOOD PRESSURE: 104 MMHG | RESPIRATION RATE: 18 BRPM | BODY MASS INDEX: 27 KG/M2

## 2022-06-17 DIAGNOSIS — D49.2 SOFT TISSUE TUMOR OF RIGHT FOOT: Primary | ICD-10-CM

## 2022-06-17 LAB
ATRIAL RATE: 66 BPM
CALCULATED P AXIS, ECG09: 54 DEGREES
CALCULATED R AXIS, ECG10: 84 DEGREES
CALCULATED T AXIS, ECG11: 42 DEGREES
DIAGNOSIS, 93000: NORMAL
GLUCOSE BLD STRIP.AUTO-MCNC: 111 MG/DL (ref 65–117)
P-R INTERVAL, ECG05: 150 MS
PERFORMED BY, TECHID: NORMAL
Q-T INTERVAL, ECG07: 416 MS
QRS DURATION, ECG06: 136 MS
QTC CALCULATION (BEZET), ECG08: 436 MS
VENTRICULAR RATE, ECG03: 66 BPM

## 2022-06-17 PROCEDURE — 28092 REMOVAL OF TOE LESIONS: CPT | Performed by: PODIATRIST

## 2022-06-17 PROCEDURE — 74011250636 HC RX REV CODE- 250/636: Performed by: NURSE ANESTHETIST, CERTIFIED REGISTERED

## 2022-06-17 PROCEDURE — 74011000250 HC RX REV CODE- 250: Performed by: ANESTHESIOLOGY

## 2022-06-17 PROCEDURE — 74011000250 HC RX REV CODE- 250: Performed by: NURSE ANESTHETIST, CERTIFIED REGISTERED

## 2022-06-17 PROCEDURE — 82962 GLUCOSE BLOOD TEST: CPT

## 2022-06-17 PROCEDURE — 74011250636 HC RX REV CODE- 250/636: Performed by: PODIATRIST

## 2022-06-17 PROCEDURE — 2709999900 HC NON-CHARGEABLE SUPPLY: Performed by: PODIATRIST

## 2022-06-17 PROCEDURE — 76060000032 HC ANESTHESIA 0.5 TO 1 HR: Performed by: PODIATRIST

## 2022-06-17 PROCEDURE — 76010000138 HC OR TIME 0.5 TO 1 HR: Performed by: PODIATRIST

## 2022-06-17 PROCEDURE — 76210000021 HC REC RM PH II 0.5 TO 1 HR: Performed by: PODIATRIST

## 2022-06-17 PROCEDURE — 88305 TISSUE EXAM BY PATHOLOGIST: CPT

## 2022-06-17 PROCEDURE — 93005 ELECTROCARDIOGRAM TRACING: CPT

## 2022-06-17 PROCEDURE — 76210000006 HC OR PH I REC 0.5 TO 1 HR: Performed by: PODIATRIST

## 2022-06-17 RX ORDER — SODIUM CHLORIDE 0.9 % (FLUSH) 0.9 %
5-40 SYRINGE (ML) INJECTION EVERY 8 HOURS
Status: DISCONTINUED | OUTPATIENT
Start: 2022-06-17 | End: 2022-06-17 | Stop reason: HOSPADM

## 2022-06-17 RX ORDER — HYDROMORPHONE HYDROCHLORIDE 1 MG/ML
0.5 INJECTION, SOLUTION INTRAMUSCULAR; INTRAVENOUS; SUBCUTANEOUS
Status: DISCONTINUED | OUTPATIENT
Start: 2022-06-17 | End: 2022-06-17 | Stop reason: HOSPADM

## 2022-06-17 RX ORDER — MORPHINE SULFATE 10 MG/ML
2 INJECTION, SOLUTION INTRAMUSCULAR; INTRAVENOUS
Status: DISCONTINUED | OUTPATIENT
Start: 2022-06-17 | End: 2022-06-17 | Stop reason: HOSPADM

## 2022-06-17 RX ORDER — SODIUM CHLORIDE 0.9 % (FLUSH) 0.9 %
5-40 SYRINGE (ML) INJECTION AS NEEDED
Status: DISCONTINUED | OUTPATIENT
Start: 2022-06-17 | End: 2022-06-17 | Stop reason: HOSPADM

## 2022-06-17 RX ORDER — OXYCODONE AND ACETAMINOPHEN 5; 325 MG/1; MG/1
1 TABLET ORAL AS NEEDED
Status: DISCONTINUED | OUTPATIENT
Start: 2022-06-17 | End: 2022-06-17 | Stop reason: HOSPADM

## 2022-06-17 RX ORDER — LABETALOL HCL 20 MG/4 ML
5 SYRINGE (ML) INTRAVENOUS
Status: DISCONTINUED | OUTPATIENT
Start: 2022-06-17 | End: 2022-06-17 | Stop reason: HOSPADM

## 2022-06-17 RX ORDER — MIDAZOLAM HYDROCHLORIDE 1 MG/ML
0.5 INJECTION, SOLUTION INTRAMUSCULAR; INTRAVENOUS
Status: DISCONTINUED | OUTPATIENT
Start: 2022-06-17 | End: 2022-06-17 | Stop reason: HOSPADM

## 2022-06-17 RX ORDER — ONDANSETRON 2 MG/ML
INJECTION INTRAMUSCULAR; INTRAVENOUS AS NEEDED
Status: DISCONTINUED | OUTPATIENT
Start: 2022-06-17 | End: 2022-06-17 | Stop reason: HOSPADM

## 2022-06-17 RX ORDER — OXYCODONE AND ACETAMINOPHEN 5; 325 MG/1; MG/1
1 TABLET ORAL
Qty: 12 TABLET | Refills: 0 | Status: SHIPPED | OUTPATIENT
Start: 2022-06-17 | End: 2022-06-20

## 2022-06-17 RX ORDER — LIDOCAINE HYDROCHLORIDE 20 MG/ML
INJECTION, SOLUTION EPIDURAL; INFILTRATION; INTRACAUDAL; PERINEURAL AS NEEDED
Status: DISCONTINUED | OUTPATIENT
Start: 2022-06-17 | End: 2022-06-17 | Stop reason: HOSPADM

## 2022-06-17 RX ORDER — CEFAZOLIN SODIUM 1 G/3ML
INJECTION, POWDER, FOR SOLUTION INTRAMUSCULAR; INTRAVENOUS AS NEEDED
Status: DISCONTINUED | OUTPATIENT
Start: 2022-06-17 | End: 2022-06-17 | Stop reason: HOSPADM

## 2022-06-17 RX ORDER — METOPROLOL TARTRATE 5 MG/5ML
2.5 INJECTION INTRAVENOUS
Status: DISCONTINUED | OUTPATIENT
Start: 2022-06-17 | End: 2022-06-17 | Stop reason: HOSPADM

## 2022-06-17 RX ORDER — HYDRALAZINE HYDROCHLORIDE 20 MG/ML
10 INJECTION INTRAMUSCULAR; INTRAVENOUS
Status: DISCONTINUED | OUTPATIENT
Start: 2022-06-17 | End: 2022-06-17 | Stop reason: HOSPADM

## 2022-06-17 RX ORDER — DIPHENHYDRAMINE HYDROCHLORIDE 50 MG/ML
12.5 INJECTION, SOLUTION INTRAMUSCULAR; INTRAVENOUS AS NEEDED
Status: DISCONTINUED | OUTPATIENT
Start: 2022-06-17 | End: 2022-06-17 | Stop reason: HOSPADM

## 2022-06-17 RX ORDER — SODIUM CHLORIDE, SODIUM LACTATE, POTASSIUM CHLORIDE, CALCIUM CHLORIDE 600; 310; 30; 20 MG/100ML; MG/100ML; MG/100ML; MG/100ML
20 INJECTION, SOLUTION INTRAVENOUS CONTINUOUS
Status: DISCONTINUED | OUTPATIENT
Start: 2022-06-17 | End: 2022-06-17 | Stop reason: HOSPADM

## 2022-06-17 RX ORDER — NORETHINDRONE AND ETHINYL ESTRADIOL 0.5-0.035
5 KIT ORAL AS NEEDED
Status: DISCONTINUED | OUTPATIENT
Start: 2022-06-17 | End: 2022-06-17 | Stop reason: HOSPADM

## 2022-06-17 RX ORDER — PROPOFOL 10 MG/ML
INJECTION, EMULSION INTRAVENOUS AS NEEDED
Status: DISCONTINUED | OUTPATIENT
Start: 2022-06-17 | End: 2022-06-17 | Stop reason: HOSPADM

## 2022-06-17 RX ORDER — FENTANYL CITRATE 50 UG/ML
50 INJECTION, SOLUTION INTRAMUSCULAR; INTRAVENOUS
Status: DISCONTINUED | OUTPATIENT
Start: 2022-06-17 | End: 2022-06-17 | Stop reason: HOSPADM

## 2022-06-17 RX ORDER — DEXAMETHASONE SODIUM PHOSPHATE 4 MG/ML
INJECTION, SOLUTION INTRA-ARTICULAR; INTRALESIONAL; INTRAMUSCULAR; INTRAVENOUS; SOFT TISSUE AS NEEDED
Status: DISCONTINUED | OUTPATIENT
Start: 2022-06-17 | End: 2022-06-17 | Stop reason: HOSPADM

## 2022-06-17 RX ORDER — ONDANSETRON 2 MG/ML
4 INJECTION INTRAMUSCULAR; INTRAVENOUS AS NEEDED
Status: DISCONTINUED | OUTPATIENT
Start: 2022-06-17 | End: 2022-06-17 | Stop reason: HOSPADM

## 2022-06-17 RX ORDER — MIDAZOLAM HYDROCHLORIDE 1 MG/ML
1 INJECTION, SOLUTION INTRAMUSCULAR; INTRAVENOUS AS NEEDED
Status: DISCONTINUED | OUTPATIENT
Start: 2022-06-17 | End: 2022-06-17 | Stop reason: HOSPADM

## 2022-06-17 RX ORDER — FENTANYL CITRATE 50 UG/ML
INJECTION, SOLUTION INTRAMUSCULAR; INTRAVENOUS AS NEEDED
Status: DISCONTINUED | OUTPATIENT
Start: 2022-06-17 | End: 2022-06-17 | Stop reason: HOSPADM

## 2022-06-17 RX ORDER — SODIUM CHLORIDE, SODIUM LACTATE, POTASSIUM CHLORIDE, CALCIUM CHLORIDE 600; 310; 30; 20 MG/100ML; MG/100ML; MG/100ML; MG/100ML
INJECTION, SOLUTION INTRAVENOUS
Status: DISCONTINUED | OUTPATIENT
Start: 2022-06-17 | End: 2022-06-17 | Stop reason: HOSPADM

## 2022-06-17 RX ORDER — FENTANYL CITRATE 50 UG/ML
50 INJECTION, SOLUTION INTRAMUSCULAR; INTRAVENOUS AS NEEDED
Status: DISCONTINUED | OUTPATIENT
Start: 2022-06-17 | End: 2022-06-17 | Stop reason: HOSPADM

## 2022-06-17 RX ORDER — LIDOCAINE HYDROCHLORIDE 10 MG/ML
0.1 INJECTION, SOLUTION EPIDURAL; INFILTRATION; INTRACAUDAL; PERINEURAL AS NEEDED
Status: DISCONTINUED | OUTPATIENT
Start: 2022-06-17 | End: 2022-06-17 | Stop reason: HOSPADM

## 2022-06-17 RX ORDER — MIDAZOLAM HYDROCHLORIDE 1 MG/ML
INJECTION, SOLUTION INTRAMUSCULAR; INTRAVENOUS AS NEEDED
Status: DISCONTINUED | OUTPATIENT
Start: 2022-06-17 | End: 2022-06-17 | Stop reason: HOSPADM

## 2022-06-17 RX ADMIN — MIDAZOLAM HYDROCHLORIDE 2 MG: 2 INJECTION, SOLUTION INTRAMUSCULAR; INTRAVENOUS at 10:03

## 2022-06-17 RX ADMIN — ONDANSETRON 4 MG: 2 INJECTION INTRAMUSCULAR; INTRAVENOUS at 10:09

## 2022-06-17 RX ADMIN — FENTANYL CITRATE 100 MCG: 50 INJECTION, SOLUTION INTRAMUSCULAR; INTRAVENOUS at 10:06

## 2022-06-17 RX ADMIN — SODIUM CHLORIDE, POTASSIUM CHLORIDE, SODIUM LACTATE AND CALCIUM CHLORIDE 20 ML/HR: 600; 310; 30; 20 INJECTION, SOLUTION INTRAVENOUS at 09:14

## 2022-06-17 RX ADMIN — LIDOCAINE HYDROCHLORIDE 80 MG: 20 INJECTION, SOLUTION EPIDURAL; INFILTRATION; INTRACAUDAL; PERINEURAL at 10:07

## 2022-06-17 RX ADMIN — SODIUM CHLORIDE, POTASSIUM CHLORIDE, SODIUM LACTATE AND CALCIUM CHLORIDE: 600; 310; 30; 20 INJECTION, SOLUTION INTRAVENOUS at 10:03

## 2022-06-17 RX ADMIN — PROPOFOL 160 MG: 10 INJECTION, EMULSION INTRAVENOUS at 10:07

## 2022-06-17 RX ADMIN — CEFAZOLIN SODIUM 2 G: 1 INJECTION, POWDER, FOR SOLUTION INTRAMUSCULAR; INTRAVENOUS at 10:12

## 2022-06-17 RX ADMIN — SODIUM CHLORIDE, POTASSIUM CHLORIDE, SODIUM LACTATE AND CALCIUM CHLORIDE: 600; 310; 30; 20 INJECTION, SOLUTION INTRAVENOUS at 10:17

## 2022-06-17 RX ADMIN — DEXAMETHASONE SODIUM PHOSPHATE 4 MG: 4 INJECTION, SOLUTION INTRA-ARTICULAR; INTRALESIONAL; INTRAMUSCULAR; INTRAVENOUS; SOFT TISSUE at 10:09

## 2022-06-17 NOTE — OP NOTES
Operative Report    Patient: Mukesh Read MRN: [id number]  SSN: [id number]    YOB: 1975  Age: 42 y.o. Sex: female       Date of Surgery:   06/17/2022     Preoperative Diagnosis:   SOFT TISSUE TUMOR TO THE FOURTH TOE OF THE RIGHT FOOT     Postoperative Diagnosis:   Same    Surgeon(s) and Role:     * Viet Rodriguez DPM - Primary    Assistant(s):  None    Anesthesia:   General with local, consisting of 20cc of 1% lidocaine plain    Procedure:  EXCISION OF TUMOR, RIGHT FOURTH TOE     Procedure in Detail:   Pt was seen in the pre-operative holding area and all questions were answered and all concerns were addressed. The operative procedure was discussed in great detail, with all possible complications highlighted. Pt verbalized complete understanding and the consent was signed and witnessed. Pt was given ancef 2g for surgical prophylaxis. The operative limb was marked and the pt was transported to the operating room. The pt was transferred to the operating table and anesthesia was administered as indicated above. The RLE was scrubbed and draped in sterile fashion. A time out was performed to confirm the correct pt, correct procedure, correct limb, abx, allergies, fire risk and attendees within the operating room. A tourniquet was applied and inflated to the right calf at 250mmHg. Upon completion, the procedure commenced. With a #15 blade, a linear incision was made overlying the midline of the dorsum of the right fourth toe. Careful dissection was taken down to the level of target tissue, taking note to avoid vital neurovascular structures. The soft tissue tumor was located and sharply excised. The tissue was sent to pathology for analysis. Inspection was performed and all tumor was noted to have been removed. The site was flushed with normal saline. The skin was reapproximated with 4-0 nylon.   The tourniquet was released to the right calf and noted to be inflated for a total of 12 min. The surgical site was dressed with Betadine ointment, 4 x 4's, Kerlix and a light Ace wrap. A postoperative shoe was applied for protected ambulation    Pt tolerated the above procedures well and all post operative counts were correct. Pt was transferred to PACU without incident. A thorough neurovascular check was then performed. Upon meeting discharge criteria, pt was discharged home to self-care. She is instructed to keep her dressing clean/dry/intact. She is to elevate above the level of her heart and apply ice behind the right knee. She is to ambulate with the protection of her postoperative shoe. A prescription was given for Percocet 5-325 for oral analgesia.   She is to follow-up in the office in 2 weeks for suture removal    Estimated Blood Loss:    7cc    Tourniquet Time:   Total Tourniquet Time Documented:  Calf (Right) - 12 minutes  Total: Calf (Right) - 12 minutes      Implants:   none           Specimens:   ID Type Source Tests Collected by Time Destination   1 : tumor right fourth toe Preservative Toe  Antwon Benitez DPM 6/17/2022 1020 Pathology         Drains:   None                Complications:   None      Signed By:  Yvette Vann DPM     June 17, 2022

## 2022-06-17 NOTE — PROGRESS NOTES
Toes normal color, popliteal pulse positive. Drink provided, call bell within reach, will continue to monitor.

## 2022-06-17 NOTE — PROGRESS NOTES
1038: Received pt. In pacu. Toes warm, Right 2nd toe purple/discolored. OR nurse going to notify Dr. Winston Harrison. 1047: Big toe on R foot becoming purplish/discolored. Dr. Winston Harrison notified and to bedside, post op shoe removed, ace wrap removed, toes remain warm, placed in reverse trendelenberg. Pt. Awake. Pt. Denies pain or tingling. 1055: Dr. Lacey Caceres in pacu, R big toe back to normal color, R  2nd toe coloring better but still purplish tint. Warm blanket applied. 1100: Toes normal color; warm, good cap. Refill.

## 2022-06-17 NOTE — ROUTINE PROCESS
[facility]. SBAR reviewed, sites viewed. Post op shoe at bedside. Family and belongings to bedside. Educated pt. If toes become discolored apply warm compress and call Dr. Chinedu Simon if doesn't improve. Verbalizes understanding. Toes normal color at this time.

## 2022-06-17 NOTE — DISCHARGE INSTRUCTIONS
ELEVATE EXTREMITY Elevate foot and apply ice behind the right knee for swelling and pain control. Instructed to keep her dressing clean/dry/intact. Elevate above the level of her heart and apply ice behind the right knee. Ambulate with the protection of her postoperative shoe. A prescription was given for Percocet. Follow-up in the office in 2 weeks for suture removal.    Stool softener should be added when taking narcotics. Ice chips, tea, cool drink will soothe a sore throat. This will resolve in a few days.

## 2022-06-17 NOTE — ANESTHESIA PREPROCEDURE EVALUATION
Relevant Problems   No relevant active problems       Anesthetic History   No history of anesthetic complications            Review of Systems / Medical History  Patient summary reviewed, nursing notes reviewed and pertinent labs reviewed    Pulmonary          Smoker         Neuro/Psych         Psychiatric history    Comments: Anxiety/depression Cardiovascular  Within defined limits                Exercise tolerance: >4 METS  Comments: Echo:  · Echo study was technically difficulty. · LV: Calculated LVEF is 63%. Normal cavity size, wall thickness, systolic function (ejection fraction normal) and diastolic function. · LA: No atrial septal defect present.    GI/Hepatic/Renal  Within defined limits              Endo/Other    Diabetes: well controlled, type 2         Other Findings            Past Medical History:   Diagnosis Date    Anxiety     Diabetes ([facility] [address]     gestational    Fibromyalgia     Currently seeing a Neurologist to confirm diagnosis, MS, right arm pain    Ill-defined condition     Patient reports increased WBC's- has appointment with Oncologist    Migraines        Past Surgical History:   Procedure Laterality Date    HX CHOLECYSTECTOMY      HX GYN      hysterectomy    HX OOPHORECTOMY Left     january 2022    HX ORTHOPAEDIC      toe, bilateral big toes    HX TUBAL LIGATION      HX WISDOM TEETH EXTRACTION Bilateral     Bottoms only       Current Outpatient Medications   Medication Instructions    ALPRAZolam (XANAX) 0.5 mg, Oral, DAILY AS NEEDED    ammonium lactate (AMLACTIN) 12 % topical cream Topical, 2 TIMES DAILY, rub in to affected area well    baclofen (LIORESAL) 10 mg tablet Oral, 3 TIMES DAILY    clotrimazole-betamethasone (LOTRISONE) topical cream Topical, 2 TIMES DAILY    metFORMIN (GLUCOPHAGE) 500 mg, Oral, 2 TIMES DAILY WITH MEALS    SUMAtriptan (IMITREX) 50 mg, Oral, ONCE PRN       Current Facility-Administered Medications   Medication Dose Route Frequency    lactated Ringers infusion  20 mL/hr IntraVENous CONTINUOUS       Patient Vitals for the past 24 hrs:   Temp Pulse Resp BP SpO2   06/17/22 0907 36.6 °C (97.8 °F) 72 18 137/76 100 %       Lab Results   Component Value Date/Time    WBC 13.1 (H) 04/22/2022 11:33 AM    HGB 14.5 04/22/2022 11:33 AM    HCT 43.9 04/22/2022 11:33 AM    PLATELET 361 87/18/8429 11:33 AM    MCV 91 04/22/2022 11:33 AM     Lab Results   Component Value Date/Time    Sodium 141 04/22/2022 11:33 AM    Potassium 4.2 04/22/2022 11:33 AM    Chloride 102 04/22/2022 11:33 AM    CO2 20 04/22/2022 11:33 AM    Anion gap 8 01/06/2022 09:59 AM    Glucose 82 04/22/2022 11:33 AM    BUN 5 (L) 04/22/2022 11:33 AM    Creatinine 0.61 04/22/2022 11:33 AM    BUN/Creatinine ratio 8 (L) 04/22/2022 11:33 AM    GFR est AA >60 01/06/2022 09:59 AM    GFR est non-AA >60 01/06/2022 09:59 AM    Calcium 9.9 04/22/2022 11:33 AM     No results found for: APTT, PTP, INR, INREXT  Lab Results   Component Value Date/Time    Glucose 82 04/22/2022 11:33 AM    Glucose (POC) 111 06/17/2022 09:15 AM     Physical Exam    Airway  Mallampati: III    Neck ROM: normal range of motion   Mouth opening: Normal     Cardiovascular    Rhythm: regular  Rate: normal         Dental  No notable dental hx       Pulmonary  Breath sounds clear to auscultation               Abdominal         Other Findings            Anesthetic Plan    ASA: 2  Anesthesia type: general          Induction: Intravenous  Anesthetic plan and risks discussed with: Patient and Family

## 2022-06-17 NOTE — PROGRESS NOTES
Pt wheeled down to  in private vehicle. Discharge instructions reviewed. Right toes normal color, popliteal pulse palpable.

## 2022-06-30 ENCOUNTER — OFFICE VISIT (OUTPATIENT)
Dept: PODIATRY | Age: 47
End: 2022-06-30
Payer: COMMERCIAL

## 2022-06-30 VITALS
RESPIRATION RATE: 17 BRPM | TEMPERATURE: 97.8 F | HEART RATE: 71 BPM | SYSTOLIC BLOOD PRESSURE: 108 MMHG | WEIGHT: 171 LBS | DIASTOLIC BLOOD PRESSURE: 72 MMHG | HEIGHT: 67 IN | BODY MASS INDEX: 26.84 KG/M2 | OXYGEN SATURATION: 98 %

## 2022-06-30 DIAGNOSIS — Z98.890 POST-OPERATIVE STATE: Primary | ICD-10-CM

## 2022-06-30 PROCEDURE — 99024 POSTOP FOLLOW-UP VISIT: CPT | Performed by: PODIATRIST

## 2022-06-30 NOTE — PROGRESS NOTES
1. \"Have you been to the ER, urgent care clinic since your last visit? Hospitalized since your last visit? \" No    2. \"Have you seen or consulted any other health care providers outside of the [address] [place] [address] since your last visit? \" No     3. For patients aged 39-70: Has the patient had a colonoscopy / FIT/ Cologuard? Yes - Care Gap present. Most recent result on file      If the patient is female:    4. For patients aged 41-77: Has the patient had a mammogram within the past 2 years? Yes - Care Gap present. Most recent result on file      5. For patients aged 21-65: Has the patient had a pap smear? Yes - Care Gap present.  Most recent result on file     Chief Complaint   Patient presents with    Post OP Follow Up       Visit Vitals  /72 (BP 1 Location: Left upper arm, BP Patient Position: Sitting, BP Cuff Size: Adult)   Pulse 71   Temp 97.8 °F (36.6 °C) (Temporal)   Resp 17   Ht 5' 7\" (1.702 m)   Wt 171 lb (77.6 kg)   SpO2 98%   BMI 26.78 kg/m²

## 2022-07-11 NOTE — ANESTHESIA POSTPROCEDURE EVALUATION
Procedure(s):  EXCISION OF TUMOR, RIGHT FOURTH TOE.     general    Anesthesia Post Evaluation      Multimodal analgesia: multimodal analgesia used between 6 hours prior to anesthesia start to PACU discharge  Patient location during evaluation: PACU  Patient participation: complete - patient participated  Level of consciousness: awake  Pain score: 0  Pain management: adequate  Airway patency: patent  Anesthetic complications: no  Cardiovascular status: acceptable  Respiratory status: acceptable  Hydration status: acceptable  Post anesthesia nausea and vomiting:  controlled  Final Post Anesthesia Temperature Assessment:  Normothermia (36.0-37.5 degrees C)      INITIAL Post-op Vital signs:   Vitals Value Taken Time   /69 06/17/22 1100   Temp 37 °C (98.6 °F) 06/17/22 1107   Pulse 77 06/17/22 1100   Resp 15 06/17/22 1100   SpO2 99 % 06/17/22 1100
No

## 2022-07-30 NOTE — PROGRESS NOTES
[facility]      HPI:  Patient is being seen for first postoperative visit status post excision of soft tissue tumor to the left 4th toe. Patient states she has had no pain since the procedure. She states she has elevated her left lower extremity is much as possible. She is applying ice behind the knee. She has kept her dressing/splint clean, dry, intact. She states she has only ambulated with the protection of her post shoe to the left foot. She denies any local/systemic signs infection. She denies any other pedal complaints       ROS:  A complete review of systems is unremarkable outside of HPI       PE:  Upon removing the surgical dressing, surgical site noted to be intact with no signs of infection or dehiscence. ASSESSMENT:  S/p soft tissue tumor excision fourth digit (DOS: 06/17/2022)       PLAN:  Surgical dressing removed to the left lower extremity. Sutures removed without incident. Patient cleared from surgical protocol and is activity as tolerated  She is to continue with oral anti-inflammatories as needed        Tee Deutsch, [facility], [address] [place] [address] and [facility]  [address] [place] [address]  O: [phone]  F: [phone]  C: [phone]

## 2022-08-26 LAB
ENA SS-A AB SER-ACNC: <0.2 AI (ref 0–0.9)
ENA SS-B AB SER-ACNC: <0.2 AI (ref 0–0.9)

## 2022-10-06 ENCOUNTER — TELEPHONE (OUTPATIENT)
Dept: NEUROLOGY | Age: 47
End: 2022-10-06

## 2022-10-13 ENCOUNTER — OFFICE VISIT (OUTPATIENT)
Dept: NEUROLOGY | Age: 47
End: 2022-10-13
Payer: COMMERCIAL

## 2022-10-13 VITALS — HEART RATE: 79 BPM | OXYGEN SATURATION: 96 % | DIASTOLIC BLOOD PRESSURE: 94 MMHG | SYSTOLIC BLOOD PRESSURE: 122 MMHG

## 2022-10-13 DIAGNOSIS — G35 MS (MULTIPLE SCLEROSIS) (HCC): Primary | ICD-10-CM

## 2022-10-13 PROCEDURE — 99214 OFFICE O/P EST MOD 30 MIN: CPT | Performed by: SPECIALIST

## 2022-10-13 RX ORDER — BUPROPION HYDROCHLORIDE 150 MG/1
TABLET, EXTENDED RELEASE ORAL
COMMUNITY
Start: 2022-10-06

## 2022-10-13 NOTE — LETTER
10/13/2022    Patient: Quincy Monterroso   YOB: 1975   Date of Visit: 10/13/2022     Israel Mojica [address] [address]  Via Fax: [phone]    Dear Lorena Leal,      Thank you for referring Ms. Elijah Hardy to [address] [place] [address] for evaluation. My notes for this consultation are attached. If you have questions, please do not hesitate to call me. I look forward to following your patient along with you.       Sincerely,    Milagros Connors MD

## 2022-10-13 NOTE — PATIENT INSTRUCTIONS
Patient history viewed patient examined. Concerns with recent updated MRI of head and neck definitely implicate MS as I see the course of events over 2 years and her very important history shared with me once again. Would like to refer to physical therapy and also initiate a 3-day pulse IV Solu-Medrol treatment scheme to see if we can help her improve her situation. We will give her some literature on the symptom and oral disease modifying therapies as well. We will catch up in about 6 weeks or so and see how things are going and what the decision making process can be narrowed to.

## 2022-10-13 NOTE — PROGRESS NOTES
Neurology Consult      Subjective:      Aminata Angulo is a 42 y.o. female who comes in on revisit. Previous revisit is adequately documented and concerns went to what had been discovered on MRI imaging but I did not get the MRI report and recently until this morning. Has a subtle new lesion in the left brachium pontis with faint enhancement consistent with active demyelination. Has several foci of T2 and flair hyperintensity in the right periatrial and pericallosal white matter ventral bebo and left brachium pontis totaling 6. The MRI of the C-spine shows motion limited exam and a nonenhancing T2 and STIR hyperintensity focus in the ventral cord at C2-3. However this is compatible and stable from MRI done in November 2021 and no evidence of active cord demyelination is pictured. From the patient's perspective has noticed she is getting weaker and more loss of sensibility on the right side. This is especially the case with the right leg in terms of motor performance limping etc.  So far special sensory function intact as is bulbar function. Occasionally can second-guess her cognitive capabilities. Fortunately has not had any falls as I know the history. As noted had a remote LP but apparently there was so many difficulties with the LP and the results were dismissed as an adequate or poorly handled CSF? Is definitely not interested in pursuing the LP angle again with that experience. Is interested in moving forward with her case and that we will be our point of direction as noted below. Will suggest a revisit in about 6 weeks and see where we are with the physical therapy referral 3 days of IV pulse Solu-Medrol rescue and she will look at the literature in terms of the MS DMT intervention possibilities. Further suggestions could follow. Current Outpatient Medications   Medication Sig Dispense Refill    baclofen (LIORESAL) 10 mg tablet Take  by mouth three (3) times daily. clotrimazole-betamethasone (LOTRISONE) topical cream Apply  to affected area two (2) times a day. 45 g 0    ammonium lactate (AMLACTIN) 12 % topical cream Apply  to affected area two (2) times a day. rub in to affected area well 280 g 2    metFORMIN (GLUCOPHAGE) 500 mg tablet Take 500 mg by mouth two (2) times daily (with meals). SUMAtriptan (IMITREX) 50 mg tablet Take 50 mg by mouth once as needed for Migraine. ALPRAZolam (XANAX) 1 mg tablet Take 0.5 mg by mouth daily as needed. buPROPion SR (WELLBUTRIN SR) 150 mg SR tablet TAKE ONE TABLET BY MOUTH DAILY FOR 3 DAYS, THEN INCREASE TO 1 TABLET TWICE DAILY (Patient not taking: Reported on 10/13/2022)        Allergies   Allergen Reactions    Sulfa (Sulfonamide Antibiotics) Unable to Obtain     Migraine Headache    Vancomycin Other (comments)     Red streaks at injection site.      Zoloft [Sertraline] Unable to Obtain     Anxious and passed out     Past Medical History:   Diagnosis Date    Anxiety     Diabetes ([facility] [address]     gestational    Fibromyalgia     Currently seeing a Neurologist to confirm diagnosis, MS, right arm pain    Ill-defined condition     Patient reports increased WBC's- has appointment with Oncologist    Migraines       Past Surgical History:   Procedure Laterality Date    HX CHOLECYSTECTOMY      HX GYN      hysterectomy    HX OOPHORECTOMY Left     january 2022    HX ORTHOPAEDIC      toe, bilateral big toes    HX TUBAL LIGATION      HX WISDOM TEETH EXTRACTION Bilateral     Bottoms only      Social History     Socioeconomic History    Marital status:      Spouse name: Not on file    Number of children: Not on file    Years of education: Not on file    Highest education level: Not on file   Occupational History    Not on file   Tobacco Use    Smoking status: Every Day     Packs/day: 0.50     Years: 15.00     Pack years: 7.50     Types: Cigarettes    Smokeless tobacco: Never   Vaping Use    Vaping Use: Some days    Substances: Nicotine Substance and Sexual Activity    Alcohol use: Yes     Comment: Rarely    Drug use: Never    Sexual activity: Not on file   Other Topics Concern    Not on file   Social History Narrative    Not on file     Social Determinants of Health     Financial Resource Strain: Not on file   Food Insecurity: Not on file   Transportation Needs: Not on file   Physical Activity: Not on file   Stress: Not on file   Social Connections: Not on file   Intimate Partner Violence: Not on file   Housing Stability: Not on file      Family History   Problem Relation Age of Onset    Lupus Mother     Coronary Art Dis Other     Heart Disease Other         MI    Diabetes Other     Lung Cancer Other     Breast Cancer Maternal Aunt     Stomach Cancer Maternal Grandfather     Coronary Art Dis Maternal Grandfather     Diabetes Maternal Grandfather     Prostate Cancer Paternal Grandfather     Coronary Art Dis Paternal Grandfather     Diabetes Paternal Grandfather     Coronary Art Dis Father     Diabetes Father       Visit Vitals  BP (!) 122/94 (BP 1 Location: Left upper arm, BP Patient Position: Sitting, BP Cuff Size: Adult)   Pulse 79   SpO2 96%        Review of Systems:   A comprehensive review of systems was negative except for that written in the HPI. Neuro Exam:     Appearance: The patient is well developed, well nourished, provides a coherent history and is in no acute distress. Mental Status: Oriented to time, place and person. Mood and affect appropriate. Cranial Nerves:   Intact visual fields. Fundi are benign. MARC, EOM's full, no nystagmus, no ptosis. Facial sensation is normal. Corneal reflexes are intact. Facial movement is symmetric. Hearing is normal bilaterally. Palate is midline with normal sternocleidomastoid and trapezius muscles are normal. Tongue is midline. Motor:  5/5 strength in upper and lower proximal and distal muscles on the left and breakthrough weakness on the right leg greater than arm.   Would assess the arm at 5 - and the leg at 4+ to 5 -. . Normal bulk and tone. No fasciculations. Reflexes:   Deep tendon reflexes 2-3+/4 and symmetrical.   Sensory:   Normal to touch, pinprick and vibration on the left side and diminished on the right side. Gait:  As before patient has a right leg limp reflecting intrinsic weakness. Tremor:   No tremor noted. Cerebellar:  No cerebellar signs present. Neurovascular:  Normal heart sounds and regular rhythm, peripheral pulses intact, and no carotid bruits. Toes suggestively extensor on the right. No clonus no Echevarria's. Assessment:   MS.  I am thinking there is sufficient information from first visit and the second 1 and especially with updates on the MRI of head and neck that we have a very reassuring diagnosis from my perspective. Would like to get on with the treatment and care aspects that include referral to physical therapy 3-day pulse IV Solu-Medrol and will give patient some literature on the drug for symptom and some of the oral MS drug DMT. Will suggest revisit in about 6 weeks and see where we are with things. Plan:   Revisit 6 weeks.   Signed by :  Yung Camacho MD

## 2022-10-19 ENCOUNTER — TELEPHONE (OUTPATIENT)
Dept: NEUROLOGY | Age: 47
End: 2022-10-19

## 2022-10-19 NOTE — TELEPHONE ENCOUNTER
Victoria w/ [facility] calling stating the [facility] office does not accept patients insurance. She said the [facility] and [facility] do accept her insurance. She also stated the number that's on file is incorrect.

## 2022-11-01 ENCOUNTER — TELEPHONE (OUTPATIENT)
Dept: NEUROLOGY | Age: 47
End: 2022-11-01

## 2022-11-05 ENCOUNTER — HOSPITAL ENCOUNTER (EMERGENCY)
Age: 47
Discharge: HOME | End: 2022-11-05
Attending: EMERGENCY MEDICINE
Payer: COMMERCIAL

## 2022-11-05 ENCOUNTER — APPOINTMENT (OUTPATIENT)
Dept: GENERAL RADIOLOGY | Age: 47
End: 2022-11-05
Attending: EMERGENCY MEDICINE
Payer: COMMERCIAL

## 2022-11-05 VITALS
HEART RATE: 78 BPM | RESPIRATION RATE: 20 BRPM | SYSTOLIC BLOOD PRESSURE: 121 MMHG | TEMPERATURE: 98.5 F | OXYGEN SATURATION: 99 % | WEIGHT: 173 LBS | HEIGHT: 67 IN | DIASTOLIC BLOOD PRESSURE: 79 MMHG | BODY MASS INDEX: 27.15 KG/M2

## 2022-11-05 DIAGNOSIS — G35 MULTIPLE SCLEROSIS (HCC): Primary | ICD-10-CM

## 2022-11-05 LAB
ALBUMIN SERPL-MCNC: 4.6 G/DL (ref 3.5–5)
ALBUMIN/GLOB SERPL: 1.1 {RATIO} (ref 1.1–2.2)
ALP SERPL-CCNC: 84 U/L (ref 45–117)
ALT SERPL-CCNC: 23 U/L (ref 12–78)
ANION GAP SERPL CALC-SCNC: 11 MMOL/L (ref 5–15)
AST SERPL W P-5'-P-CCNC: 12 U/L (ref 15–37)
BASOPHILS # BLD: 0 K/UL (ref 0–0.1)
BASOPHILS NFR BLD: 0 % (ref 0–1)
BILIRUB SERPL-MCNC: 0.3 MG/DL (ref 0.2–1)
BUN SERPL-MCNC: 12 MG/DL (ref 6–20)
BUN/CREAT SERPL: 15 (ref 12–20)
CA-I BLD-MCNC: 9.8 MG/DL (ref 8.5–10.1)
CHLORIDE SERPL-SCNC: 101 MMOL/L (ref 97–108)
CO2 SERPL-SCNC: 27 MMOL/L (ref 21–32)
CREAT SERPL-MCNC: 0.81 MG/DL (ref 0.55–1.02)
DIFFERENTIAL METHOD BLD: ABNORMAL
EOSINOPHIL # BLD: 0 K/UL (ref 0–0.4)
EOSINOPHIL NFR BLD: 0 % (ref 0–7)
ERYTHROCYTE [DISTWIDTH] IN BLOOD BY AUTOMATED COUNT: 12.4 % (ref 11.5–14.5)
GLOBULIN SER CALC-MCNC: 4.3 G/DL (ref 2–4)
GLUCOSE SERPL-MCNC: 154 MG/DL (ref 65–100)
HCT VFR BLD AUTO: 46.8 % (ref 35–47)
HGB BLD-MCNC: 15.8 G/DL (ref 11.5–16)
IMM GRANULOCYTES # BLD AUTO: 0 K/UL (ref 0–0.04)
IMM GRANULOCYTES NFR BLD AUTO: 0 % (ref 0–0.5)
LYMPHOCYTES # BLD: 1.9 K/UL (ref 0.8–3.5)
LYMPHOCYTES NFR BLD: 13 % (ref 12–49)
MCH RBC QN AUTO: 29.7 PG (ref 26–34)
MCHC RBC AUTO-ENTMCNC: 33.8 G/DL (ref 30–36.5)
MCV RBC AUTO: 88 FL (ref 80–99)
MONOCYTES # BLD: 0.2 K/UL (ref 0–1)
MONOCYTES NFR BLD: 1 % (ref 5–13)
NEUTS SEG # BLD: 12.6 K/UL (ref 1.8–8)
NEUTS SEG NFR BLD: 86 % (ref 32–75)
PLATELET # BLD AUTO: 330 K/UL (ref 150–400)
PMV BLD AUTO: 11.4 FL (ref 8.9–12.9)
POTASSIUM SERPL-SCNC: 4 MMOL/L (ref 3.5–5.1)
PROT SERPL-MCNC: 8.9 G/DL (ref 6.4–8.2)
RBC # BLD AUTO: 5.32 M/UL (ref 3.8–5.2)
SODIUM SERPL-SCNC: 139 MMOL/L (ref 136–145)
TROPONIN-HIGH SENSITIVITY: <4 NG/L (ref 0–51)
WBC # BLD AUTO: 14.8 K/UL (ref 3.6–11)

## 2022-11-05 PROCEDURE — 84484 ASSAY OF TROPONIN QUANT: CPT

## 2022-11-05 PROCEDURE — 99284 EMERGENCY DEPT VISIT MOD MDM: CPT

## 2022-11-05 PROCEDURE — 85025 COMPLETE CBC W/AUTO DIFF WBC: CPT

## 2022-11-05 PROCEDURE — 74011250636 HC RX REV CODE- 250/636: Performed by: EMERGENCY MEDICINE

## 2022-11-05 PROCEDURE — 96374 THER/PROPH/DIAG INJ IV PUSH: CPT

## 2022-11-05 PROCEDURE — 36415 COLL VENOUS BLD VENIPUNCTURE: CPT

## 2022-11-05 PROCEDURE — 71045 X-RAY EXAM CHEST 1 VIEW: CPT

## 2022-11-05 PROCEDURE — 80053 COMPREHEN METABOLIC PANEL: CPT

## 2022-11-05 RX ADMIN — METHYLPREDNISOLONE SODIUM SUCCINATE 500 MG: 125 INJECTION, POWDER, FOR SOLUTION INTRAMUSCULAR; INTRAVENOUS at 16:37

## 2022-11-05 RX ADMIN — SODIUM CHLORIDE 1000 ML: 9 INJECTION, SOLUTION INTRAVENOUS at 15:38

## 2022-11-05 NOTE — DISCHARGE INSTRUCTIONS
Thank you! Thank you for allowing me to care for you in the emergency department. It is my goal to provide you with excellent care. If you have not received excellent quality care, please ask to speak to the nurse manager. Please fill out the survey that will come to you by mail or email since we listen to your feedback! Below you will find a list of your tests from today's visit. Should you have any questions, please do not hesitate to call the emergency department. Labs  Recent Results (from the past 12 hour(s))   CBC WITH AUTOMATED DIFF    Collection Time: 11/05/22  3:30 PM   Result Value Ref Range    WBC 14.8 (H) 3.6 - 11.0 K/uL    RBC 5.32 (H) 3.80 - 5.20 M/uL    HGB 15.8 11.5 - 16.0 g/dL    HCT 46.8 35.0 - 47.0 %    MCV 88.0 80.0 - 99.0 FL    MCH 29.7 26.0 - 34.0 PG    MCHC 33.8 30.0 - 36.5 g/dL    RDW 12.4 11.5 - 14.5 %    PLATELET 287 704 - 845 K/uL    MPV 11.4 8.9 - 12.9 FL    NEUTROPHILS 86 (H) 32 - 75 %    LYMPHOCYTES 13 12 - 49 %    MONOCYTES 1 (L) 5 - 13 %    EOSINOPHILS 0 0 - 7 %    BASOPHILS 0 0 - 1 %    IMMATURE GRANULOCYTES 0 0.0 - 0.5 %    ABS. NEUTROPHILS 12.6 (H) 1.8 - 8.0 K/UL    ABS. LYMPHOCYTES 1.9 0.8 - 3.5 K/UL    ABS. MONOCYTES 0.2 0.0 - 1.0 K/UL    ABS. EOSINOPHILS 0.0 0.0 - 0.4 K/UL    ABS. BASOPHILS 0.0 0.0 - 0.1 K/UL    ABS. IMM. GRANS. 0.0 0.00 - 0.04 K/UL    DF AUTOMATED     METABOLIC PANEL, COMPREHENSIVE    Collection Time: 11/05/22  3:30 PM   Result Value Ref Range    Sodium 139 136 - 145 mmol/L    Potassium 4.0 3.5 - 5.1 mmol/L    Chloride 101 97 - 108 mmol/L    CO2 27 21 - 32 mmol/L    Anion gap 11 5 - 15 mmol/L    Glucose 154 (H) 65 - 100 mg/dL    BUN 12 6 - 20 mg/dL    Creatinine 0.81 0.55 - 1.02 mg/dL    BUN/Creatinine ratio 15 12 - 20      eGFR >60 >60 ml/min/1.73m2    Calcium 9.8 8.5 - 10.1 mg/dL    Bilirubin, total 0.3 0.2 - 1.0 mg/dL    AST (SGOT) 12 (L) 15 - 37 U/L    ALT (SGPT) 23 12 - 78 U/L    Alk.  phosphatase 84 45 - 117 U/L    Protein, total 8.9 (H) 6.4 - 8.2 g/dL    Albumin 4.6 3.5 - 5.0 g/dL    Globulin 4.3 (H) 2.0 - 4.0 g/dL    A-G Ratio 1.1 1.1 - 2.2     TROPONIN-HIGH SENSITIVITY    Collection Time: 11/05/22  3:30 PM   Result Value Ref Range    Troponin-High Sensitivity <4 0 - 51 ng/L       Radiologic Studies  XR CHEST PORT   Final Result   No Acute Disease. CT Results  (Last 48 hours)      None          CXR Results  (Last 48 hours)                 11/05/22 1548  XR CHEST PORT Final result    Impression:  No Acute Disease. Narrative:  EXAM: Portable CXR. 1546 hours. INDICATION: cp       FINDINGS:   The lungs appear clear. Heart is normal in size. There is no pulmonary edema. There is no evident pneumothorax or pleural effusion.                 ------------------------------------------------------------------------------------------------------------  The exam and treatment you received in the Emergency Department were for an urgent problem and are not intended as complete care. It is important that you follow-up with a doctor, nurse practitioner, or physician assistant to:  (1) confirm your diagnosis,  (2) re-evaluation of changes in your illness and treatment, and  (3) for ongoing care. Please take your discharge instructions with you when you go to your follow-up appointment. If you have any problem arranging a follow-up appointment, contact the Emergency Department. If your symptoms become worse or you do not improve as expected and you are unable to reach your health care provider, please return to the Emergency Department. We are available 24 hours a day. If a prescription has been provided, please have it filled as soon as possible to prevent a delay in treatment. If you have any questions or reservations about taking the medication due to side effects or interactions with other medications, please call your primary care provider or contact the ER.

## 2022-11-05 NOTE — ED TRIAGE NOTES
Called [facility] and spoke to Sayra to follow up on bed assignment.   Patient states having \"all over muscle pain/burning and some SOB like the MS hug, it feels like somebody is hugging me really hard and making me SOB\". Patient recently Dx with MS, having symptoms x 2 days. Is suppose to have a \" 3 day IV infusion for high dose solumedrol but it's hasn't been set up yet\". She's Dr. Greg Denver with [facility].

## 2022-11-06 ENCOUNTER — APPOINTMENT (OUTPATIENT)
Dept: GENERAL RADIOLOGY | Age: 47
End: 2022-11-06
Attending: EMERGENCY MEDICINE
Payer: COMMERCIAL

## 2022-11-06 ENCOUNTER — HOSPITAL ENCOUNTER (EMERGENCY)
Age: 47
Discharge: HOME | End: 2022-11-06
Attending: STUDENT IN AN ORGANIZED HEALTH CARE EDUCATION/TRAINING PROGRAM
Payer: COMMERCIAL

## 2022-11-06 VITALS
BODY MASS INDEX: 27.15 KG/M2 | HEART RATE: 77 BPM | TEMPERATURE: 98 F | HEIGHT: 67 IN | SYSTOLIC BLOOD PRESSURE: 135 MMHG | OXYGEN SATURATION: 100 % | RESPIRATION RATE: 18 BRPM | DIASTOLIC BLOOD PRESSURE: 87 MMHG | WEIGHT: 173 LBS

## 2022-11-06 DIAGNOSIS — T88.7XXA MEDICATION SIDE EFFECT: Primary | ICD-10-CM

## 2022-11-06 DIAGNOSIS — G35 MULTIPLE SCLEROSIS (HCC): ICD-10-CM

## 2022-11-06 LAB
ALBUMIN SERPL-MCNC: 4.1 G/DL (ref 3.5–5)
ALBUMIN/GLOB SERPL: 1.1 {RATIO} (ref 1.1–2.2)
ALP SERPL-CCNC: 78 U/L (ref 45–117)
ALT SERPL-CCNC: 17 U/L (ref 12–78)
ANION GAP SERPL CALC-SCNC: 8 MMOL/L (ref 5–15)
APPEARANCE UR: CLEAR
AST SERPL W P-5'-P-CCNC: 17 U/L (ref 15–37)
BACTERIA URNS QL MICRO: NEGATIVE /HPF
BASOPHILS # BLD: 0 K/UL (ref 0–0.1)
BASOPHILS NFR BLD: 0 % (ref 0–1)
BILIRUB SERPL-MCNC: 0.4 MG/DL (ref 0.2–1)
BILIRUB UR QL: NEGATIVE
BUN SERPL-MCNC: 7 MG/DL (ref 6–20)
BUN/CREAT SERPL: 9 (ref 12–20)
CA-I BLD-MCNC: 10 MG/DL (ref 8.5–10.1)
CHLORIDE SERPL-SCNC: 105 MMOL/L (ref 97–108)
CO2 SERPL-SCNC: 24 MMOL/L (ref 21–32)
COLOR UR: ABNORMAL
CREAT SERPL-MCNC: 0.8 MG/DL (ref 0.55–1.02)
DIFFERENTIAL METHOD BLD: ABNORMAL
EOSINOPHIL # BLD: 0 K/UL (ref 0–0.4)
EOSINOPHIL NFR BLD: 0 % (ref 0–7)
ERYTHROCYTE [DISTWIDTH] IN BLOOD BY AUTOMATED COUNT: 12.6 % (ref 11.5–14.5)
GLOBULIN SER CALC-MCNC: 3.7 G/DL (ref 2–4)
GLUCOSE SERPL-MCNC: 152 MG/DL (ref 65–100)
GLUCOSE UR STRIP.AUTO-MCNC: NEGATIVE MG/DL
HCT VFR BLD AUTO: 43.4 % (ref 35–47)
HGB BLD-MCNC: 15.1 G/DL (ref 11.5–16)
HGB UR QL STRIP: ABNORMAL
IMM GRANULOCYTES # BLD AUTO: 0.2 K/UL (ref 0–0.04)
IMM GRANULOCYTES NFR BLD AUTO: 1 % (ref 0–0.5)
KETONES UR QL STRIP.AUTO: NEGATIVE MG/DL
LEUKOCYTE ESTERASE UR QL STRIP.AUTO: NEGATIVE
LYMPHOCYTES # BLD: 1.9 K/UL (ref 0.8–3.5)
LYMPHOCYTES NFR BLD: 7 % (ref 12–49)
MCH RBC QN AUTO: 30.4 PG (ref 26–34)
MCHC RBC AUTO-ENTMCNC: 34.8 G/DL (ref 30–36.5)
MCV RBC AUTO: 87.3 FL (ref 80–99)
MONOCYTES # BLD: 0.6 K/UL (ref 0–1)
MONOCYTES NFR BLD: 2 % (ref 5–13)
MUCOUS THREADS URNS QL MICRO: ABNORMAL /LPF
NEUTS SEG # BLD: 23.7 K/UL (ref 1.8–8)
NEUTS SEG NFR BLD: 90 % (ref 32–75)
NITRITE UR QL STRIP.AUTO: NEGATIVE
NRBC # BLD: 0 K/UL (ref 0–0.01)
NRBC BLD-RTO: 0 PER 100 WBC
PH UR STRIP: 5 [PH] (ref 5–8)
PLATELET # BLD AUTO: 332 K/UL (ref 150–400)
PMV BLD AUTO: 11.9 FL (ref 8.9–12.9)
POTASSIUM SERPL-SCNC: 4.7 MMOL/L (ref 3.5–5.1)
PROT SERPL-MCNC: 7.8 G/DL (ref 6.4–8.2)
PROT UR STRIP-MCNC: NEGATIVE MG/DL
RBC # BLD AUTO: 4.97 M/UL (ref 3.8–5.2)
RBC #/AREA URNS HPF: ABNORMAL /HPF (ref 0–5)
SODIUM SERPL-SCNC: 137 MMOL/L (ref 136–145)
SP GR UR REFRACTOMETRY: <1.005 (ref 1–1.03)
TROPONIN-HIGH SENSITIVITY: <3 NG/L (ref 0–51)
UA: UC IF INDICATED,UAUC: ABNORMAL
UROBILINOGEN UR QL STRIP.AUTO: 0.1 EU/DL (ref 0.1–1)
WBC # BLD AUTO: 26.5 K/UL (ref 3.6–11)
WBC URNS QL MICRO: ABNORMAL /HPF (ref 0–4)

## 2022-11-06 PROCEDURE — 71045 X-RAY EXAM CHEST 1 VIEW: CPT

## 2022-11-06 PROCEDURE — 80053 COMPREHEN METABOLIC PANEL: CPT

## 2022-11-06 PROCEDURE — 36415 COLL VENOUS BLD VENIPUNCTURE: CPT

## 2022-11-06 PROCEDURE — 96374 THER/PROPH/DIAG INJ IV PUSH: CPT

## 2022-11-06 PROCEDURE — 74011250636 HC RX REV CODE- 250/636: Performed by: STUDENT IN AN ORGANIZED HEALTH CARE EDUCATION/TRAINING PROGRAM

## 2022-11-06 PROCEDURE — 74011250636 HC RX REV CODE- 250/636: Performed by: EMERGENCY MEDICINE

## 2022-11-06 PROCEDURE — 85025 COMPLETE CBC W/AUTO DIFF WBC: CPT

## 2022-11-06 PROCEDURE — 96375 TX/PRO/DX INJ NEW DRUG ADDON: CPT

## 2022-11-06 PROCEDURE — 84484 ASSAY OF TROPONIN QUANT: CPT

## 2022-11-06 PROCEDURE — 93005 ELECTROCARDIOGRAM TRACING: CPT

## 2022-11-06 PROCEDURE — 99285 EMERGENCY DEPT VISIT HI MDM: CPT

## 2022-11-06 PROCEDURE — 81001 URINALYSIS AUTO W/SCOPE: CPT

## 2022-11-06 PROCEDURE — 74011000250 HC RX REV CODE- 250: Performed by: EMERGENCY MEDICINE

## 2022-11-06 RX ORDER — DIPHENHYDRAMINE HCL 25 MG
25 CAPSULE ORAL
Qty: 20 CAPSULE | Refills: 0 | Status: SHIPPED | OUTPATIENT
Start: 2022-11-06

## 2022-11-06 RX ORDER — METOCLOPRAMIDE HYDROCHLORIDE 5 MG/ML
10 INJECTION INTRAMUSCULAR; INTRAVENOUS
Status: COMPLETED | OUTPATIENT
Start: 2022-11-06 | End: 2022-11-06

## 2022-11-06 RX ORDER — DIPHENHYDRAMINE HYDROCHLORIDE 50 MG/ML
50 INJECTION, SOLUTION INTRAMUSCULAR; INTRAVENOUS
Status: COMPLETED | OUTPATIENT
Start: 2022-11-06 | End: 2022-11-06

## 2022-11-06 RX ORDER — FAMOTIDINE 20 MG/1
20 TABLET, FILM COATED ORAL 2 TIMES DAILY
Qty: 20 TABLET | Refills: 0 | Status: SHIPPED | OUTPATIENT
Start: 2022-11-06 | End: 2022-11-16

## 2022-11-06 RX ADMIN — METOCLOPRAMIDE 10 MG: 5 INJECTION, SOLUTION INTRAMUSCULAR; INTRAVENOUS at 15:18

## 2022-11-06 RX ADMIN — SODIUM CHLORIDE, PRESERVATIVE FREE 20 MG: 5 INJECTION INTRAVENOUS at 14:38

## 2022-11-06 RX ADMIN — SODIUM CHLORIDE 1000 ML: 9 INJECTION, SOLUTION INTRAVENOUS at 14:37

## 2022-11-06 RX ADMIN — DIPHENHYDRAMINE HYDROCHLORIDE 50 MG: 50 INJECTION INTRAMUSCULAR; INTRAVENOUS at 14:38

## 2022-11-06 NOTE — DISCHARGE INSTRUCTIONS
Thank you! Thank you for allowing me to care for you in the emergency department. I sincerely hope that you are satisfied with your visit today. It is my goal to provide you with excellent care. Below you will find a list of your labs and imaging from your visit today if applicable. Should you have any questions regarding these results please do not hesitate to call the emergency department. Please review [facility] for a more detailed result list since the below list may not be comprehensive. Instructions on how to sign up to [facility] should be provided in this packet. Labs -     Recent Results (from the past 12 hour(s))   CBC WITH AUTOMATED DIFF    Collection Time: 11/06/22 12:05 PM   Result Value Ref Range    WBC 26.5 (H) 3.6 - 11.0 K/uL    RBC 4.97 3.80 - 5.20 M/uL    HGB 15.1 11.5 - 16.0 g/dL    HCT 43.4 35.0 - 47.0 %    MCV 87.3 80.0 - 99.0 FL    MCH 30.4 26.0 - 34.0 PG    MCHC 34.8 30.0 - 36.5 g/dL    RDW 12.6 11.5 - 14.5 %    PLATELET 127 165 - 046 K/uL    MPV 11.9 8.9 - 12.9 FL    NRBC 0.0 0.0  WBC    ABSOLUTE NRBC 0.00 0.00 - 0.01 K/uL    NEUTROPHILS 90 (H) 32 - 75 %    LYMPHOCYTES 7 (L) 12 - 49 %    MONOCYTES 2 (L) 5 - 13 %    EOSINOPHILS 0 0 - 7 %    BASOPHILS 0 0 - 1 %    IMMATURE GRANULOCYTES 1 (H) 0 - 0.5 %    ABS. NEUTROPHILS 23.7 (H) 1.8 - 8.0 K/UL    ABS. LYMPHOCYTES 1.9 0.8 - 3.5 K/UL    ABS. MONOCYTES 0.6 0.0 - 1.0 K/UL    ABS. EOSINOPHILS 0.0 0.0 - 0.4 K/UL    ABS. BASOPHILS 0.0 0.0 - 0.1 K/UL    ABS. IMM.  GRANS. 0.2 (H) 0.00 - 0.04 K/UL    DF AUTOMATED     METABOLIC PANEL, COMPREHENSIVE    Collection Time: 11/06/22 12:05 PM   Result Value Ref Range    Sodium 137 136 - 145 mmol/L    Potassium 4.7 3.5 - 5.1 mmol/L    Chloride 105 97 - 108 mmol/L    CO2 24 21 - 32 mmol/L    Anion gap 8 5 - 15 mmol/L    Glucose 152 (H) 65 - 100 mg/dL    BUN 7 6 - 20 mg/dL    Creatinine 0.80 0.55 - 1.02 mg/dL    BUN/Creatinine ratio 9 (L) 12 - 20      eGFR >60 >60 ml/min/1.73m2    Calcium 10.0 8.5 - 10.1 mg/dL    Bilirubin, total 0.4 0.2 - 1.0 mg/dL    AST (SGOT) 17 15 - 37 U/L    ALT (SGPT) 17 12 - 78 U/L    Alk. phosphatase 78 45 - 117 U/L    Protein, total 7.8 6.4 - 8.2 g/dL    Albumin 4.1 3.5 - 5.0 g/dL    Globulin 3.7 2.0 - 4.0 g/dL    A-G Ratio 1.1 1.1 - 2.2     TROPONIN-HIGH SENSITIVITY    Collection Time: 11/06/22 12:05 PM   Result Value Ref Range    Troponin-High Sensitivity <3 0 - 51 ng/L   URINALYSIS W/ REFLEX CULTURE    Collection Time: 11/06/22 12:09 PM    Specimen: Urine   Result Value Ref Range    Color Yellow/Straw      Appearance Clear Clear      Specific gravity <1.005 1.003 - 1.030    pH (UA) 5.0 5.0 - 8.0      Protein Negative Negative mg/dL    Glucose Negative Negative mg/dL    Ketone Negative Negative mg/dL    Bilirubin Negative Negative      Blood Small (A) Negative      Urobilinogen 0.1 0.1 - 1.0 EU/dL    Nitrites Negative Negative      Leukocyte Esterase Negative Negative      UA:UC IF INDICATED Culture not indicated by UA result Culture not indicated by UA result      WBC 0-4 0 - 4 /hpf    RBC 0-5 0 - 5 /hpf    Bacteria Negative Negative /hpf    Mucus Trace /lpf       Radiologic Studies -   XR CHEST PORT   Final Result   1. No acute disease            CT Results  (Last 48 hours)      None          CXR Results  (Last 48 hours)                 11/06/22 1158  XR CHEST PORT Final result    Impression:  1. No acute disease           Narrative:  INDICATION:  chest pain        Exam: Portable chest 1157. Comparison: 11/5/2022. Findings: Cardiomediastinal silhouette is within normal limits. Pulmonary   vasculature is not engorged. There are no focal parenchymal opacities,   effusions, or pneumothorax. 11/05/22 1548  XR CHEST PORT Final result    Impression:  No Acute Disease. Narrative:  EXAM: Portable CXR. 1546 hours. INDICATION: cp       FINDINGS:   The lungs appear clear. Heart is normal in size. There is no pulmonary edema.    There is no evident pneumothorax or pleural effusion. If you feel that you have not received excellent quality care or timely care, please ask to speak to the nurse manager. Please choose us in the future for your continued health care needs. ------------------------------------------------------------------------------------------------------------  The exam and treatment you received in the Emergency Department were for an urgent problem and are not intended as complete care. It is important that you follow-up with a doctor, nurse practitioner, or physician assistant to:  (1) confirm your diagnosis,  (2) re-evaluation of changes in your illness and treatment, and  (3) for ongoing care. If your symptoms become worse or you do not improve as expected and you are unable to reach your usual health care provider, you should return to the Emergency Department. We are available 24 hours a day. Please take your discharge instructions with you when you go to your follow-up appointment. If a prescription has been provided, please have it filled as soon as possible to prevent a delay in treatment. Read the entire medication instruction sheet provided to you by the pharmacy. If you have any questions or reservations about taking the medication due to side effects or interactions with other medications, please call your primary care physician or contact the ER to speak with the charge nurse. Make an appointment with your family doctor or the physician you were referred to for follow-up of this visit as instructed on your discharge paperwork, as this is a mandatory follow-up. Return to the ER if you are unable to be seen or if you are unable to be seen in a timely manner. If you have any problem arranging the follow-up visit, contact the Emergency Department immediately.

## 2022-11-06 NOTE — ED PROVIDER NOTES
[id number]  EMERGENCY DEPARTMENT ENCOUNTER NOTE    Date: 11/6/2022  Patient Name: Adelina Russell    History of Presenting Illness     Chief Complaint   Patient presents with    Allergic Reaction    Shortness of Breath     HPI: Adelina Russell, 41 y.o. female with a past medical history and outpatient medications as listed and reviewed below  presents for side effect of Solu-Medrol injection concern. Yesterday, the patient had a mild MS flareup with weakness and numbness in the right upper extremity. She saw her neurologist and recommended a burst of Solu-Medrol. She went to the ER and got 500 mg IV push of Solu-Medrol in the right after started having some headache, sore throat, and flushing of the skin. She has felt fatigued and had no resolving symptoms since then. She comes in today for an unresolving symptoms of skin flushing and fatigue. She also reports headache, pressure-like, bilateral, with nausea without vomiting. No worsening of her weakness. No shortness of breath or wheezing.   Reports some intermittent squeezing sensation in the middle of the chest.    Medical History   I reviewed the medical, surgical, family, and social history, as well as allergies:    PCP: Sandy Gupta DO    Past Medical History:  Past Medical History:   Diagnosis Date    Anxiety     Diabetes ([facility] [address]     gestational    Fibromyalgia     Currently seeing a Neurologist to confirm diagnosis, MS, right arm pain    Ill-defined condition     Patient reports increased WBC's- has appointment with Oncologist    Migraines      Past Surgical History:  Past Surgical History:   Procedure Laterality Date    HX CHOLECYSTECTOMY      HX GYN      hysterectomy    HX OOPHORECTOMY Left     january 2022    HX ORTHOPAEDIC      toe, bilateral big toes    HX TUBAL LIGATION      HX WISDOM TEETH EXTRACTION Bilateral     Bottoms only     Current Outpatient Medications:  Current Outpatient Medications   Medication Instructions    ALPRAZolam (XANAX) 0.5 mg, Oral, DAILY AS NEEDED    ammonium lactate (AMLACTIN) 12 % topical cream Topical, 2 TIMES DAILY, rub in to affected area well    baclofen (LIORESAL) 10 mg tablet Oral, 3 TIMES DAILY    buPROPion SR (WELLBUTRIN SR) 150 mg SR tablet TAKE ONE TABLET BY MOUTH DAILY FOR 3 DAYS, THEN INCREASE TO 1 TABLET TWICE DAILY    clotrimazole-betamethasone (LOTRISONE) topical cream Topical, 2 TIMES DAILY    diphenhydrAMINE (BENADRYL) 25 mg, Oral, EVERY 6 HOURS AS NEEDED    famotidine (PEPCID) 20 mg, Oral, 2 TIMES DAILY    metFORMIN (GLUCOPHAGE) 500 mg, Oral, 2 TIMES DAILY WITH MEALS    SUMAtriptan (IMITREX) 50 mg, Oral, ONCE PRN      Family History:  Family History   Problem Relation Age of Onset    Lupus Mother     Coronary Art Dis Other     Heart Disease Other         MI    Diabetes Other     Lung Cancer Other     Breast Cancer Maternal Aunt     Stomach Cancer Maternal Grandfather     Coronary Art Dis Maternal Grandfather     Diabetes Maternal Grandfather     Prostate Cancer Paternal Grandfather     Coronary Art Dis Paternal Grandfather     Diabetes Paternal Grandfather     Coronary Art Dis Father     Diabetes Father      Social History:  Social History     Tobacco Use    Smoking status: Every Day     Packs/day: 0.50     Years: 15.00     Pack years: 7.50     Types: Cigarettes    Smokeless tobacco: Never   Vaping Use    Vaping Use: Some days    Substances: Nicotine   Substance Use Topics    Alcohol use: Yes     Comment: Rarely    Drug use: Never     Allergies: Allergies   Allergen Reactions    Sulfa (Sulfonamide Antibiotics) Unable to Obtain     Migraine Headache    Vancomycin Other (comments)     Red streaks at injection site. Zoloft [Sertraline] Unable to Obtain     Anxious and passed out       Review of Systems     Review of Systems  Negative: Positives and pertinent negatives as per HPI. All other systems were reviewed and are negative.     Physical Exam & Vital Signs   Vital Signs - I reviewed the patient's vital signs. Patient Vitals for the past 12 hrs:   Temp Pulse Resp BP SpO2   11/06/22 1147 -- -- -- -- 100 %   11/06/22 1134 98 °F (36.7 °C) 77 18 135/87 100 %     Physical Exam:    GENERAL: awake, alert, cooperative, not in distress  HEENT:  * Pupils equal, EOMI  * Head atraumatic  CV:  * audible heart sounds  * warm and perfused extremities bilaterally  PULMONARY: Good air movement, no wheezes, no crackles  ABDOMEN/: soft, no distension, no guarding, no abdominal tenderness  EXTREMITIES/BACK: warm and perfused, no tenderness, no edema  SKIN: diffuse skin flushing  NEURO:  * Speech clear  * Moves U&LE to command    Medical Decision Making     Patient is a 41 y.o. female presenting for skin flushing, chest squeezing, and headache. Vitals reveal no significant abnormalities and physical exam reveals  skin flushing . EKG showed  RBBB . Based on the history, physical exam, risk factors, and vital signs, differential includes: Allergic reaction, side effect of Solu-Medrol, hyperglycemia, FREDY, hepatitis, MS flareup. See ED Course and Reassessment for evaluation and discussion. EMR Automatically Imported Results     Labs:  Recent Results (from the past 12 hour(s))   CBC WITH AUTOMATED DIFF    Collection Time: 11/06/22 12:05 PM   Result Value Ref Range    WBC 26.5 (H) 3.6 - 11.0 K/uL    RBC 4.97 3.80 - 5.20 M/uL    HGB 15.1 11.5 - 16.0 g/dL    HCT 43.4 35.0 - 47.0 %    MCV 87.3 80.0 - 99.0 FL    MCH 30.4 26.0 - 34.0 PG    MCHC 34.8 30.0 - 36.5 g/dL    RDW 12.6 11.5 - 14.5 %    PLATELET 119 939 - 768 K/uL    MPV 11.9 8.9 - 12.9 FL    NRBC 0.0 0.0  WBC    ABSOLUTE NRBC 0.00 0.00 - 0.01 K/uL    NEUTROPHILS 90 (H) 32 - 75 %    LYMPHOCYTES 7 (L) 12 - 49 %    MONOCYTES 2 (L) 5 - 13 %    EOSINOPHILS 0 0 - 7 %    BASOPHILS 0 0 - 1 %    IMMATURE GRANULOCYTES 1 (H) 0 - 0.5 %    ABS. NEUTROPHILS 23.7 (H) 1.8 - 8.0 K/UL    ABS. LYMPHOCYTES 1.9 0.8 - 3.5 K/UL    ABS.  MONOCYTES 0.6 0.0 - 1.0 K/UL    ABS. EOSINOPHILS 0.0 0.0 - 0.4 K/UL    ABS. BASOPHILS 0.0 0.0 - 0.1 K/UL    ABS. IMM. GRANS. 0.2 (H) 0.00 - 0.04 K/UL    DF AUTOMATED     METABOLIC PANEL, COMPREHENSIVE    Collection Time: 11/06/22 12:05 PM   Result Value Ref Range    Sodium 137 136 - 145 mmol/L    Potassium 4.7 3.5 - 5.1 mmol/L    Chloride 105 97 - 108 mmol/L    CO2 24 21 - 32 mmol/L    Anion gap 8 5 - 15 mmol/L    Glucose 152 (H) 65 - 100 mg/dL    BUN 7 6 - 20 mg/dL    Creatinine 0.80 0.55 - 1.02 mg/dL    BUN/Creatinine ratio 9 (L) 12 - 20      eGFR >60 >60 ml/min/1.73m2    Calcium 10.0 8.5 - 10.1 mg/dL    Bilirubin, total 0.4 0.2 - 1.0 mg/dL    AST (SGOT) 17 15 - 37 U/L    ALT (SGPT) 17 12 - 78 U/L    Alk. phosphatase 78 45 - 117 U/L    Protein, total 7.8 6.4 - 8.2 g/dL    Albumin 4.1 3.5 - 5.0 g/dL    Globulin 3.7 2.0 - 4.0 g/dL    A-G Ratio 1.1 1.1 - 2.2     TROPONIN-HIGH SENSITIVITY    Collection Time: 11/06/22 12:05 PM   Result Value Ref Range    Troponin-High Sensitivity <3 0 - 51 ng/L   URINALYSIS W/ REFLEX CULTURE    Collection Time: 11/06/22 12:09 PM    Specimen: Urine   Result Value Ref Range    Color Yellow/Straw      Appearance Clear Clear      Specific gravity <1.005 1.003 - 1.030    pH (UA) 5.0 5.0 - 8.0      Protein Negative Negative mg/dL    Glucose Negative Negative mg/dL    Ketone Negative Negative mg/dL    Bilirubin Negative Negative      Blood Small (A) Negative      Urobilinogen 0.1 0.1 - 1.0 EU/dL    Nitrites Negative Negative      Leukocyte Esterase Negative Negative      UA:UC IF INDICATED Culture not indicated by UA result Culture not indicated by UA result      WBC 0-4 0 - 4 /hpf    RBC 0-5 0 - 5 /hpf    Bacteria Negative Negative /hpf    Mucus Trace /lpf     Radiologic Studies:  CT Results  (Last 48 hours)      None          CXR Results  (Last 48 hours)                 11/06/22 1158  XR CHEST PORT Final result    Impression:  1.  No acute disease           Narrative:  INDICATION:  chest pain        Exam: Portable chest 1157. Comparison: 11/5/2022. Findings: Cardiomediastinal silhouette is within normal limits. Pulmonary   vasculature is not engorged. There are no focal parenchymal opacities,   effusions, or pneumothorax. 11/05/22 1548  XR CHEST PORT Final result    Impression:  No Acute Disease. Narrative:  EXAM: Portable CXR. 1546 hours. INDICATION: cp       FINDINGS:   The lungs appear clear. Heart is normal in size. There is no pulmonary edema. There is no evident pneumothorax or pleural effusion. Medications ordered:  Medications   sodium chloride 0.9 % bolus infusion 1,000 mL (1,000 mL IntraVENous New Bag 11/6/22 1437)   diphenhydrAMINE (BENADRYL) injection 50 mg (50 mg IntraVENous Given 11/6/22 1438)   famotidine (PF) (PEPCID) 20 mg in 0.9% sodium chloride 10 mL injection (20 mg IntraVENous Given 11/6/22 1438)   metoclopramide HCl (REGLAN) injection 10 mg (10 mg IntraVENous Given 11/6/22 1518)       ED Course & Reassessment     ED Course:          Reassessment:    After evaluation in the emergency department, the patient did not have any recurrent symptoms. On repeat examination, the patient does not have any uvular edema, wheezing, or shortness of breath. At time of reassessment prior to discharge the patient had normal voice, no drooling, and no evidence of obstruction. No reported nausea or vomiting. No other complaints and symptoms have improved. Will discharge home with a diagnosis of allergic reaction with return precautions. I explained that symptoms may recur and I gave return instructions for the patient to return if any new symptoms occur including but not limited to nausea, shortness of breath, tingling in the back of the throat, shortness of breath, or any other new symptoms. Understanding of the plan was ensured prior to discharge. Will discharge home with follow-up and return precautions.     It was discussed that the patient is to return to the ER or call with any questions concerning new or worsening symptoms. More in depth discharge instructions regarding follow up and return precautions were given also in the patient's discharge paperwork. Final Disposition     Discharge: DISCHARGED FROM EMERGENCY DEPARTMENT    Patient will be discharged from the Emergency Department in stable condition. All of the diagnostic tests were reviewed and any questions were answered. Diagnosis, results, follow up if applicable, and return precautions were discussed. I have also put together printed discharge instructions for them that include: 1) educational information regarding their diagnosis, 2) how to care for their diagnosis at home, as well a 3) list of reasons why they would want to return to the ED prior to their follow-up appointment, should their condition change. Any labs or imaging done in the ED will be either printed with the discharge paperwork or available through [address] DISCHARGE PLAN:  1. Current Discharge Medication List        START taking these medications    Details   diphenhydrAMINE (BenadryL) 25 mg capsule Take 1 Capsule by mouth every six (6) hours as needed for Itching. Qty: 20 Capsule, Refills: 0      famotidine (Pepcid) 20 mg tablet Take 1 Tablet by mouth two (2) times a day for 10 days. Qty: 20 Tablet, Refills: 0           CONTINUE these medications which have NOT CHANGED    Details   buPROPion SR (WELLBUTRIN SR) 150 mg SR tablet TAKE ONE TABLET BY MOUTH DAILY FOR 3 DAYS, THEN INCREASE TO 1 TABLET TWICE DAILY      baclofen (LIORESAL) 10 mg tablet Take  by mouth three (3) times daily. clotrimazole-betamethasone (LOTRISONE) topical cream Apply  to affected area two (2) times a day. Qty: 45 g, Refills: 0      ammonium lactate (AMLACTIN) 12 % topical cream Apply  to affected area two (2) times a day.  rub in to affected area well  Qty: 280 g, Refills: 2      metFORMIN (GLUCOPHAGE) 500 mg tablet Take 500 mg by mouth two (2) times daily (with meals). SUMAtriptan (IMITREX) 50 mg tablet Take 50 mg by mouth once as needed for Migraine. ALPRAZolam (XANAX) 1 mg tablet Take 0.5 mg by mouth daily as needed. 2.   Follow-up Information       Follow up With Specialties Details Why Contact Info    Your doctor  Schedule an appointment as soon as possible for a visit in 1 day      [address] [facility] EMERGENCY DEPT Emergency Medicine Go to  If symptoms worsen [address] [id number] [phone]          3. Return to ED if worse    4. Current Discharge Medication List        START taking these medications    Details   diphenhydrAMINE (BenadryL) 25 mg capsule Take 1 Capsule by mouth every six (6) hours as needed for Itching. Qty: 20 Capsule, Refills: 0  Start date: 11/6/2022      famotidine (Pepcid) 20 mg tablet Take 1 Tablet by mouth two (2) times a day for 10 days. Qty: 20 Tablet, Refills: 0  Start date: 11/6/2022, End date: 11/16/2022             Procedures, Critical Care, & Clinical Tools   Performed by: Emilee Reza MD  Procedures     EKG interpretation (Preliminary):  Rhythm: normal sinus rhythm; and regular . Rate (approx.): 76. Axis: Rightward axis;  [place] interval: normal;  QRS: RBBB;  ST/T wave: normal;  Other findings: abnormal - RBBB. CRITICAL CARE DOCUMENTATION  NOT MET: Critical care billing criteria and/or time were NOT met. HEART SCORE  History: Slightly or Non-suspicious  ECG: Normal  Age: Less than or equal to 45 years  Risk Factors: 1 or 2 risk factors  Troponin: Less than or equal to normal limit   HEART Score Total : 1     Management   Scores 0-3: 0.9-1.7% risk of adverse cardiac event. In the HEART Score study, these patients were discharged (0.99% in the retrospective study, 1.7% in the prospective study)   Scores 4-6: 12-16.6% risk of adverse cardiac event.  In the HEART Score study, these patients were admitted to the hospital. (11.6% retrospective, 16.6% prospective) Scores ? 7: 50-65% risk of adverse cardiac event. In the HEART Score study, these patients were candidates for early invasive measures. (65.2% retrospective, 50.1% prospective)   A MACE (Major Adverse Cardiac Event) was defined as all-cause mortality, myocardial infarction, or coronary revascularization. Original/Primary Reference  Alfred MARTIN, Nicole Montes. Chest pain in the emergency room: value of the HEART score. Neth Heart J. 2008;16(6):191-6. Validation  Alfred Montes, Nicole FOREMAN, et al. A prospective validation of the HEART score for chest pain patients at the emergency department. Int J Cardiol. 2013;168(3):2153-8. Alfred Montes, Bishnu Calloway, et al. Chest pain in the emergency room: a multicenter validation of the HEART Score. Crit Pathw Cardiol. 2010;9(3):164-9. Eitan CHRIS, Donald RF, Emelyn BC, et al. The HEART Pathway Randomized Controlled Trial One Year Outcomes. Acad Emerg Med. 2018;       Diagnosis     Clinical Impression:   1. Medication side effect    2. [facility]        Attestations:  Alaina Perez MD    Documentation Comments   - I am the first and primary provider for this patient and am the primary provider of record. - Initial assessment performed. The patients presenting problems have been discussed, and the staff are in agreement with the care plan formulated and outlined with them. I have encouraged them to ask questions as they arise throughout their visit. - Available medical records, nursing notes, old EKGs, and EMS run sheets (if patient was EMS transported) were reviewed    Please note that this dictation was completed with Soundtracker, the [employer] voice recognition software. Quite often unanticipated grammatical, syntax, homophones, and other interpretive errors are inadvertently transcribed by the computer software. Please disregard these errors. Please excuse any errors that have escaped final proofreading.

## 2022-11-06 NOTE — ED TRIAGE NOTES
[facility] was [facility] the [facility] yesterday for a MS flare up, given 500mg of solumedrol at one time. Patient states that right after given medication yesterday she started with itchy throat. Woke up feeling flushed. Started having the chest tightness.

## 2022-11-07 LAB
ATRIAL RATE: 78 BPM
CALCULATED P AXIS, ECG09: 62 DEGREES
CALCULATED R AXIS, ECG10: 85 DEGREES
CALCULATED T AXIS, ECG11: 30 DEGREES
DIAGNOSIS, 93000: NORMAL
P-R INTERVAL, ECG05: 138 MS
Q-T INTERVAL, ECG07: 388 MS
QRS DURATION, ECG06: 128 MS
QTC CALCULATION (BEZET), ECG08: 442 MS
VENTRICULAR RATE, ECG03: 78 BPM

## 2022-11-07 RX ORDER — PREDNISONE 10 MG/1
TABLET ORAL
Qty: 42 TABLET | Refills: 0 | Status: SHIPPED | OUTPATIENT
Start: 2022-11-07 | End: 2022-12-01

## 2022-11-07 NOTE — ED PROVIDER NOTES
EMERGENCY DEPARTMENT HISTORY AND PHYSICAL EXAM      Date: 11/5/2022  Patient Name: Marvin Gamboa    History of Presenting Illness     Chief Complaint   Patient presents with    Muscle Pain    Shortness of Breath       History okay provided By: Patient    HPI: Marvin Gamboa, 41 y.o. female presenting to the emergency department for evaluation of MS flare. Patient reporting chest squeeze/MS hug. Also endorsing generalized muscle pain and burning. Symptoms have been ongoing for the last 2 days. Is supposed to be undergoing Solu-Medrol infusion sometime in the near future. Patient unclear as to what exacerbated the flair    There are no other complaints, changes, or physical findings at this time. PCP: Rhiannon Nguyen, DO    No current facility-administered medications on file prior to encounter. Current Outpatient Medications on File Prior to Encounter   Medication Sig Dispense Refill    buPROPion SR (WELLBUTRIN SR) 150 mg SR tablet TAKE ONE TABLET BY MOUTH DAILY FOR 3 DAYS, THEN INCREASE TO 1 TABLET TWICE DAILY (Patient not taking: Reported on 10/13/2022)      baclofen (LIORESAL) 10 mg tablet Take  by mouth three (3) times daily. clotrimazole-betamethasone (LOTRISONE) topical cream Apply  to affected area two (2) times a day. 45 g 0    ammonium lactate (AMLACTIN) 12 % topical cream Apply  to affected area two (2) times a day. rub in to affected area well 280 g 2    metFORMIN (GLUCOPHAGE) 500 mg tablet Take 500 mg by mouth two (2) times daily (with meals). SUMAtriptan (IMITREX) 50 mg tablet Take 50 mg by mouth once as needed for Migraine. ALPRAZolam (XANAX) 1 mg tablet Take 0.5 mg by mouth daily as needed.          Past History     Past Medical History:  Past Medical History:   Diagnosis Date    Anxiety     Diabetes ([facility] [address]     gestational    Fibromyalgia     Currently seeing a Neurologist to confirm diagnosis, MS, right arm pain    Ill-defined condition     Patient reports increased WBC's- has appointment with Oncologist    Malvin        Past Surgical History:  Past Surgical History:   Procedure Laterality Date    HX CHOLECYSTECTOMY      HX GYN      hysterectomy    HX OOPHORECTOMY Left     january 2022    HX ORTHOPAEDIC      toe, bilateral big toes    HX TUBAL LIGATION      HX WISDOM TEETH EXTRACTION Bilateral     Bottoms only       Family History:  Family History   Problem Relation Age of Onset    Lupus Mother     Coronary Art Dis Other     Heart Disease Other         MI    Diabetes Other     Lung Cancer Other     Breast Cancer Maternal Aunt     Stomach Cancer Maternal Grandfather     Coronary Art Dis Maternal Grandfather     Diabetes Maternal Grandfather     Prostate Cancer Paternal Grandfather     Coronary Art Dis Paternal Grandfather     Diabetes Paternal Grandfather     Coronary Art Dis Father     Diabetes Father        Social History:  Social History     Tobacco Use    Smoking status: Every Day     Packs/day: 0.50     Years: 15.00     Pack years: 7.50     Types: Cigarettes    Smokeless tobacco: Never   Vaping Use    Vaping Use: Some days    Substances: Nicotine   Substance Use Topics    Alcohol use: Yes     Comment: Rarely    Drug use: Never       Allergies: Allergies   Allergen Reactions    Sulfa (Sulfonamide Antibiotics) Unable to Obtain     Migraine Headache    Vancomycin Other (comments)     Red streaks at injection site. Zoloft [Sertraline] Unable to Obtain     Anxious and passed out       Review of Systems   Review of Systems   Constitutional:  Positive for fatigue. Negative for chills and fever. HENT:  Negative for congestion and sore throat. Eyes:  Negative for pain and visual disturbance. Respiratory:  Positive for chest tightness. Negative for cough and shortness of breath. Cardiovascular:  Negative for chest pain and palpitations. Gastrointestinal:  Negative for constipation, diarrhea, nausea and vomiting. Genitourinary:  Negative for dysuria and frequency. Musculoskeletal:  Positive for myalgias. Negative for arthralgias. Skin:  Negative for color change and rash. Neurological:  Negative for dizziness, weakness, light-headedness and headaches. Psychiatric/Behavioral:  Negative for dysphoric mood and sleep disturbance. Physical Exam   Physical Exam  Constitutional:       Appearance: Normal appearance. HENT:      Head: Normocephalic and atraumatic. Right Ear: External ear normal.      Left Ear: External ear normal.      Nose: Nose normal.      Mouth/Throat:      Mouth: Mucous membranes are moist.   Eyes:      Extraocular Movements: Extraocular movements intact. Conjunctiva/sclera: Conjunctivae normal.   Cardiovascular:      Rate and Rhythm: Normal rate and regular rhythm. Pulses: Normal pulses. Heart sounds: Normal heart sounds. Pulmonary:      Effort: Pulmonary effort is normal.      Breath sounds: Normal breath sounds. Abdominal:      General: Abdomen is flat. There is no distension. Palpations: Abdomen is soft. Tenderness: There is no abdominal tenderness. Musculoskeletal:         General: Normal range of motion. Cervical back: Normal range of motion. Skin:     General: Skin is warm and dry. Capillary Refill: Capillary refill takes less than 2 seconds. Neurological:      General: No focal deficit present. Mental Status: She is alert and oriented to person, place, and time. Mental status is at baseline.    Psychiatric:         Mood and Affect: Mood normal.         Behavior: Behavior normal.       Lab and Diagnostic Study Results   Labs -     Recent Results (from the past 12 hour(s))   CBC WITH AUTOMATED DIFF    Collection Time: 11/06/22 12:05 PM   Result Value Ref Range    WBC 26.5 (H) 3.6 - 11.0 K/uL    RBC 4.97 3.80 - 5.20 M/uL    HGB 15.1 11.5 - 16.0 g/dL    HCT 43.4 35.0 - 47.0 %    MCV 87.3 80.0 - 99.0 FL    MCH 30.4 26.0 - 34.0 PG    MCHC 34.8 30.0 - 36.5 g/dL    RDW 12.6 11.5 - 14.5 % PLATELET 406 993 - 888 K/uL    MPV 11.9 8.9 - 12.9 FL    NRBC 0.0 0.0  WBC    ABSOLUTE NRBC 0.00 0.00 - 0.01 K/uL    NEUTROPHILS 90 (H) 32 - 75 %    LYMPHOCYTES 7 (L) 12 - 49 %    MONOCYTES 2 (L) 5 - 13 %    EOSINOPHILS 0 0 - 7 %    BASOPHILS 0 0 - 1 %    IMMATURE GRANULOCYTES 1 (H) 0 - 0.5 %    ABS. NEUTROPHILS 23.7 (H) 1.8 - 8.0 K/UL    ABS. LYMPHOCYTES 1.9 0.8 - 3.5 K/UL    ABS. MONOCYTES 0.6 0.0 - 1.0 K/UL    ABS. EOSINOPHILS 0.0 0.0 - 0.4 K/UL    ABS. BASOPHILS 0.0 0.0 - 0.1 K/UL    ABS. IMM. GRANS. 0.2 (H) 0.00 - 0.04 K/UL    DF AUTOMATED     METABOLIC PANEL, COMPREHENSIVE    Collection Time: 11/06/22 12:05 PM   Result Value Ref Range    Sodium 137 136 - 145 mmol/L    Potassium 4.7 3.5 - 5.1 mmol/L    Chloride 105 97 - 108 mmol/L    CO2 24 21 - 32 mmol/L    Anion gap 8 5 - 15 mmol/L    Glucose 152 (H) 65 - 100 mg/dL    BUN 7 6 - 20 mg/dL    Creatinine 0.80 0.55 - 1.02 mg/dL    BUN/Creatinine ratio 9 (L) 12 - 20      eGFR >60 >60 ml/min/1.73m2    Calcium 10.0 8.5 - 10.1 mg/dL    Bilirubin, total 0.4 0.2 - 1.0 mg/dL    AST (SGOT) 17 15 - 37 U/L    ALT (SGPT) 17 12 - 78 U/L    Alk.  phosphatase 78 45 - 117 U/L    Protein, total 7.8 6.4 - 8.2 g/dL    Albumin 4.1 3.5 - 5.0 g/dL    Globulin 3.7 2.0 - 4.0 g/dL    A-G Ratio 1.1 1.1 - 2.2     TROPONIN-HIGH SENSITIVITY    Collection Time: 11/06/22 12:05 PM   Result Value Ref Range    Troponin-High Sensitivity <3 0 - 51 ng/L   URINALYSIS W/ REFLEX CULTURE    Collection Time: 11/06/22 12:09 PM    Specimen: Urine   Result Value Ref Range    Color Yellow/Straw      Appearance Clear Clear      Specific gravity <1.005 1.003 - 1.030    pH (UA) 5.0 5.0 - 8.0      Protein Negative Negative mg/dL    Glucose Negative Negative mg/dL    Ketone Negative Negative mg/dL    Bilirubin Negative Negative      Blood Small (A) Negative      Urobilinogen 0.1 0.1 - 1.0 EU/dL    Nitrites Negative Negative      Leukocyte Esterase Negative Negative      UA:UC IF INDICATED Culture not indicated by UA result Culture not indicated by UA result      WBC 0-4 0 - 4 /hpf    RBC 0-5 0 - 5 /hpf    Bacteria Negative Negative /hpf    Mucus Trace /lpf       Radiologic Studies -   @lastxrresult@  CT Results  (Last 48 hours)      None          CXR Results  (Last 48 hours)                 11/06/22 1158  XR CHEST PORT Final result    Impression:  1. No acute disease           Narrative:  INDICATION:  chest pain        Exam: Portable chest 1157. Comparison: 11/5/2022. Findings: Cardiomediastinal silhouette is within normal limits. Pulmonary   vasculature is not engorged. There are no focal parenchymal opacities,   effusions, or pneumothorax. 11/05/22 1548  XR CHEST PORT Final result    Impression:  No Acute Disease. Narrative:  EXAM: Portable CXR. 1546 hours. INDICATION: cp       FINDINGS:   The lungs appear clear. Heart is normal in size. There is no pulmonary edema. There is no evident pneumothorax or pleural effusion. Medical Decision Making and ED Course   Differential Diagnosis & Medical Decision Making Provider Note:   53-year-old female presenting for evaluation of self-reported multiple sclerosis flareup. Reports associated chest tightness secondary to MS hug. CBC, CMP, troponin, chest x-ray without significant abnormality. Will give high-dose of IV Solu-Medrol and have patient follow-up with her neurologist.    - I am the first provider for this patient. I reviewed the vital signs, available nursing notes, past medical history, past surgical history, family history and social history. The patients presenting problems have been discussed, and they are in agreement with the care plan formulated and outlined with them. I have encouraged them to ask questions as they arise throughout their visit. Vital Signs-Reviewed the patient's vital signs. No data found.     ED Course:          Procedures   Performed by: Ricarda Maldonado DO  Procedures Disposition   Disposition: Condition stable  DC- Adult Discharges: All of the diagnostic tests were reviewed and questions answered. Diagnosis, care plan and treatment options were discussed. The patient understands the instructions and will follow up as directed. The patients results have been reviewed with them. They have been counseled regarding their diagnosis. The patient verbally convey understanding and agreement of the signs, symptoms, diagnosis, treatment and prognosis and additionally agrees to follow up as recommended with their PCP in 24 - 48 hours. They also agree with the care-plan and convey that all of their questions have been answered. I have also put together some discharge instructions for them that include: 1) educational information regarding their diagnosis, 2) how to care for their diagnosis at home, as well a 3) list of reasons why they would want to return to the ED prior to their follow-up appointment, should their condition change. DC-The patient was given verbal follow-up instructions    DISCHARGE PLAN:  1. Cannot display discharge medications since this patient is not currently admitted. 2.   Follow-up Information       Follow up With Specialties Details Why Contact Info    [address] [place] [address] Emergency Medicine  As needed, If symptoms worsen [facility]  [phone]    Your neurologist  Call in 1 day            3. Return to ED if worse   4. Discharge Medication List as of 11/5/2022  5:23 PM         Remove if admitted/transferred    Diagnosis/Clinical Impression     Clinical Impression:   1. Multiple sclerosis ([facility] [address]        Attestations: Shubham Zamudio, DO, am the primary clinician of record. Please note that this dictation was completed with [employer], the computer voice recognition software.   Quite often unanticipated grammatical, syntax, homophones, and other interpretive errors are inadvertently transcribed by the computer software. Please disregard these errors. Please excuse any errors that have escaped final proofreading. Thank you.

## 2022-12-01 ENCOUNTER — OFFICE VISIT (OUTPATIENT)
Dept: NEUROLOGY | Age: 47
End: 2022-12-01
Payer: COMMERCIAL

## 2022-12-01 VITALS — HEART RATE: 88 BPM | SYSTOLIC BLOOD PRESSURE: 120 MMHG | DIASTOLIC BLOOD PRESSURE: 84 MMHG

## 2022-12-01 DIAGNOSIS — G35 MS (MULTIPLE SCLEROSIS) (HCC): Primary | ICD-10-CM

## 2022-12-01 NOTE — PATIENT INSTRUCTIONS
History as noted in patient exam.  Impressed with her improved transfer and walking capabilities. We will do a [facility] lab screen and hopefully will get the symptom moving on the graduated injection process suggested by the . Revisit in about 3 months.

## 2022-12-01 NOTE — LETTER
12/1/2022    Patient: Foster Socks   YOB: 1975   Date of Visit: 12/1/2022     [facility] [address] [place] [id number]  Via Fax: [phone]    Dear Berkley Bumpers,      Thank you for referring Ms. Milly Mendenhall to [address] [place] [address] for evaluation. My notes for this consultation are attached. If you have questions, please do not hesitate to call me. I look forward to following your patient along with you.       Sincerely,    Dawood Evans MD

## 2022-12-01 NOTE — PROGRESS NOTES
Neurology Consult      Subjective:      Glenroy Guillory is a 41 y.o. female who comes in today on MS follow-up. Unfortunately had a rather impressive blood sugar elevation with the Solu-Medrol. And at this point we will remember that for future reference as it goes to prednisone rescue. We will pursue the screening process for the [facility] and I hope this is a seamless process. It includes a CBC with differential hepatitis B surface antigen hepatitis B core antibody and hepatitis B surface antibody in addition to a quantiferon TB screen and KELLEN virus and quantitative immunoglobulins as well. At this time feel that she has improved from a general strength capacity and stamina and her walking and transfers were better today and I cannot confirm that. Seems to be much more optimistic and less depressed than on the last visit. According to the above screener process I am hoping to catch up with her in about 3 months to check how she is doing on the consent to and I hope it moves along quickly. Current Outpatient Medications   Medication Sig Dispense Refill    diphenhydrAMINE (BenadryL) 25 mg capsule Take 1 Capsule by mouth every six (6) hours as needed for Itching. 20 Capsule 0    baclofen (LIORESAL) 10 mg tablet Take  by mouth three (3) times daily. clotrimazole-betamethasone (LOTRISONE) topical cream Apply  to affected area two (2) times a day. 45 g 0    ammonium lactate (AMLACTIN) 12 % topical cream Apply  to affected area two (2) times a day. rub in to affected area well 280 g 2    metFORMIN (GLUCOPHAGE) 500 mg tablet Take 500 mg by mouth two (2) times daily (with meals). SUMAtriptan (IMITREX) 50 mg tablet Take 50 mg by mouth once as needed for Migraine. ALPRAZolam (XANAX) 1 mg tablet Take 0.5 mg by mouth daily as needed.       buPROPion SR (WELLBUTRIN SR) 150 mg SR tablet TAKE ONE TABLET BY MOUTH DAILY FOR 3 DAYS, THEN INCREASE TO 1 TABLET TWICE DAILY (Patient not taking: No sig reported) Allergies   Allergen Reactions    Sulfa (Sulfonamide Antibiotics) Unable to Obtain     Migraine Headache    Vancomycin Other (comments)     Red streaks at injection site.      Zoloft [Sertraline] Unable to Obtain     Anxious and passed out     Past Medical History:   Diagnosis Date    Anxiety     Diabetes ([facility] [address]     gestational    Fibromyalgia     Currently seeing a Neurologist to confirm diagnosis, MS, right arm pain    Ill-defined condition     Patient reports increased WBC's- has appointment with Oncologist    Migraines       Past Surgical History:   Procedure Laterality Date    HX CHOLECYSTECTOMY      HX GYN      hysterectomy    HX OOPHORECTOMY Left     january 2022    HX ORTHOPAEDIC      toe, bilateral big toes    HX TUBAL LIGATION      HX WISDOM TEETH EXTRACTION Bilateral     Bottoms only      Social History     Socioeconomic History    Marital status:      Spouse name: Not on file    Number of children: Not on file    Years of education: Not on file    Highest education level: Not on file   Occupational History    Not on file   Tobacco Use    Smoking status: Every Day     Packs/day: 0.50     Years: 15.00     Pack years: 7.50     Types: Cigarettes    Smokeless tobacco: Never   Vaping Use    Vaping Use: Some days    Substances: Nicotine   Substance and Sexual Activity    Alcohol use: Yes     Comment: Rarely    Drug use: Never    Sexual activity: Not on file   Other Topics Concern    Not on file   Social History Narrative    Not on file     Social Determinants of Health     Financial Resource Strain: Not on file   Food Insecurity: Not on file   Transportation Needs: Not on file   Physical Activity: Not on file   Stress: Not on file   Social Connections: Not on file   Intimate Partner Violence: Not on file   Housing Stability: Not on file      Family History   Problem Relation Age of Onset    Lupus Mother     Coronary Art Dis Other     Heart Disease Other         MI    Diabetes Other     Lung Cancer Other     Breast Cancer Maternal Aunt     Stomach Cancer Maternal Grandfather     Coronary Art Dis Maternal Grandfather     Diabetes Maternal Grandfather     Prostate Cancer Paternal Grandfather     Coronary Art Dis Paternal Grandfather     Diabetes Paternal Grandfather     Coronary Art Dis Father     Diabetes Father       Visit Vitals  /84 (BP 1 Location: Left upper arm, BP Patient Position: Sitting, BP Cuff Size: Adult)   Pulse 88        Review of Systems:   A comprehensive review of systems was negative except for that written in the HPI. Neuro Exam:     Appearance: The patient is well developed, well nourished, provides a coherent history and is in no acute distress. Mental Status: Oriented to time, place and person. Mood and affect appropriate. Cranial Nerves:   Intact visual fields. Fundi are benign. MARC, EOM's full, no nystagmus, no ptosis. Facial sensation is normal. Corneal reflexes are intact. Facial movement is symmetric. Hearing is normal bilaterally. Palate is midline with normal sternocleidomastoid and trapezius muscles are normal. Tongue is midline. Motor:  5/5 strength in upper and lower proximal and distal muscles. Normal bulk and tone. No fasciculations. Reflexes:   Deep tendon reflexes 2+/4 and symmetrical.   Sensory:   Normal to touch, pinprick and vibration. Gait:  Normal gait. Tremor:   No tremor noted. Cerebellar:  No cerebellar signs present. Neurovascular:  Normal heart sounds and regular rhythm, peripheral pulses intact, and no carotid bruits. Assessment:   Multiple sclerosis. We will proceed with Trace horn as to [address] treatment. This will involve screening blood work that includes a CBC with differential QuantiFERON interferon test KELLEN virus screen and immunoglobulins quantitative. Will have this processed and then hopefully be able to pursue good symptom to start up. Suggest revisit in about 3 months if all goes well.   Patient is on board with these plans. Fortunately had a good response to the prednisone. Plan:   Revisit in about 3 months.   Signed by :  Raphael Ramírez MD

## 2022-12-09 ENCOUNTER — HOSPITAL ENCOUNTER (OUTPATIENT)
Dept: LAB | Age: 47
End: 2022-12-09
Payer: COMMERCIAL

## 2022-12-09 DIAGNOSIS — G35 MS (MULTIPLE SCLEROSIS) (HCC): ICD-10-CM

## 2022-12-09 LAB
BASOPHILS # BLD: 0.1 K/UL (ref 0–0.1)
BASOPHILS NFR BLD: 0 % (ref 0–1)
DIFFERENTIAL METHOD BLD: ABNORMAL
EOSINOPHIL # BLD: 0.2 K/UL (ref 0–0.4)
EOSINOPHIL NFR BLD: 1 % (ref 0–7)
ERYTHROCYTE [DISTWIDTH] IN BLOOD BY AUTOMATED COUNT: 12.8 % (ref 11.5–14.5)
HBV CORE IGM SER QL: NONREACTIVE
HBV SURFACE AB SER QL: NONREACTIVE
HBV SURFACE AB SER-ACNC: <3.1 MIU/ML
HBV SURFACE AG SER QL: <0.1 INDEX
HBV SURFACE AG SER QL: NEGATIVE
HCT VFR BLD AUTO: 45.5 % (ref 35–47)
HGB BLD-MCNC: 15.1 G/DL (ref 11.5–16)
IGA SERPL-MCNC: 117 MG/DL (ref 70–400)
IGG SERPL-MCNC: 1040 MG/DL (ref 700–1600)
IGM SERPL-MCNC: 257 MG/DL (ref 40–230)
IMM GRANULOCYTES # BLD AUTO: 0 K/UL (ref 0–0.04)
IMM GRANULOCYTES NFR BLD AUTO: 0 % (ref 0–0.5)
LYMPHOCYTES # BLD: 3.2 K/UL (ref 0.8–3.5)
LYMPHOCYTES NFR BLD: 27 % (ref 12–49)
MCH RBC QN AUTO: 29.7 PG (ref 26–34)
MCHC RBC AUTO-ENTMCNC: 33.2 G/DL (ref 30–36.5)
MCV RBC AUTO: 89.4 FL (ref 80–99)
MONOCYTES # BLD: 0.6 K/UL (ref 0–1)
MONOCYTES NFR BLD: 5 % (ref 5–13)
NEUTS SEG # BLD: 8.1 K/UL (ref 1.8–8)
NEUTS SEG NFR BLD: 67 % (ref 32–75)
NRBC # BLD: 0 K/UL (ref 0–0.01)
NRBC BLD-RTO: 0 PER 100 WBC
PLATELET # BLD AUTO: 320 K/UL (ref 150–400)
PMV BLD AUTO: 11.7 FL (ref 8.9–12.9)
RBC # BLD AUTO: 5.09 M/UL (ref 3.8–5.2)
WBC # BLD AUTO: 12.1 K/UL (ref 3.6–11)

## 2022-12-09 PROCEDURE — 86705 HEP B CORE ANTIBODY IGM: CPT

## 2022-12-09 PROCEDURE — 86706 HEP B SURFACE ANTIBODY: CPT

## 2022-12-09 PROCEDURE — 85025 COMPLETE CBC W/AUTO DIFF WBC: CPT

## 2022-12-09 PROCEDURE — 87340 HEPATITIS B SURFACE AG IA: CPT

## 2022-12-09 PROCEDURE — 82784 ASSAY IGA/IGD/IGG/IGM EACH: CPT

## 2022-12-09 PROCEDURE — 36415 COLL VENOUS BLD VENIPUNCTURE: CPT

## 2022-12-09 RX ORDER — BACLOFEN 10 MG/1
10 TABLET ORAL 3 TIMES DAILY
Qty: 90 TABLET | Refills: 3 | Status: SHIPPED | OUTPATIENT
Start: 2022-12-09 | End: 2023-04-11 | Stop reason: ALTCHOICE

## 2022-12-29 ENCOUNTER — TELEPHONE (OUTPATIENT)
Dept: NEUROLOGY | Age: 47
End: 2022-12-29

## 2022-12-30 RX ORDER — OFATUMUMAB 20 MG/.4ML
20 INJECTION, SOLUTION SUBCUTANEOUS SEE ADMIN INSTRUCTIONS
Qty: 6 EACH | Refills: 1 | Status: SHIPPED | OUTPATIENT
Start: 2022-12-30

## 2023-01-12 ENCOUNTER — TRANSCRIBE ORDER (OUTPATIENT)
Dept: SCHEDULING | Age: 48
End: 2023-01-12

## 2023-01-12 DIAGNOSIS — I70.211 ATHEROSCLEROSIS OF NATIVE ARTERY OF RIGHT LOWER EXTREMITY WITH INTERMITTENT CLAUDICATION (HCC): Primary | ICD-10-CM

## 2023-01-19 ENCOUNTER — HOSPITAL ENCOUNTER (OUTPATIENT)
Dept: NON INVASIVE DIAGNOSTICS | Age: 48
Discharge: HOME | End: 2023-01-19

## 2023-01-19 DIAGNOSIS — I70.211 ATHEROSCLEROSIS OF NATIVE ARTERY OF RIGHT LOWER EXTREMITY WITH INTERMITTENT CLAUDICATION (HCC): ICD-10-CM

## 2023-01-26 ENCOUNTER — TELEPHONE (OUTPATIENT)
Dept: NEUROLOGY | Age: 48
End: 2023-01-26

## 2023-01-26 RX ORDER — PREDNISONE 10 MG/1
TABLET ORAL
Qty: 42 TABLET | Refills: 0 | Status: SHIPPED | OUTPATIENT
Start: 2023-01-26

## 2023-01-28 ENCOUNTER — APPOINTMENT (OUTPATIENT)
Dept: GENERAL RADIOLOGY | Age: 48
End: 2023-01-28
Attending: EMERGENCY MEDICINE
Payer: COMMERCIAL

## 2023-01-28 ENCOUNTER — HOSPITAL ENCOUNTER (EMERGENCY)
Age: 48
Discharge: HOME | End: 2023-01-28
Attending: EMERGENCY MEDICINE
Payer: COMMERCIAL

## 2023-01-28 VITALS
WEIGHT: 170 LBS | SYSTOLIC BLOOD PRESSURE: 134 MMHG | OXYGEN SATURATION: 100 % | HEIGHT: 67 IN | TEMPERATURE: 99.3 F | RESPIRATION RATE: 18 BRPM | DIASTOLIC BLOOD PRESSURE: 77 MMHG | BODY MASS INDEX: 26.68 KG/M2 | HEART RATE: 77 BPM

## 2023-01-28 DIAGNOSIS — J20.9 ACUTE BRONCHITIS, COMPLICATED: Primary | ICD-10-CM

## 2023-01-28 PROCEDURE — 71045 X-RAY EXAM CHEST 1 VIEW: CPT

## 2023-01-28 PROCEDURE — 99283 EMERGENCY DEPT VISIT LOW MDM: CPT

## 2023-01-28 RX ORDER — BENZONATATE 100 MG/1
100 CAPSULE ORAL
Qty: 20 CAPSULE | Refills: 0 | Status: SHIPPED | OUTPATIENT
Start: 2023-01-28 | End: 2023-02-04

## 2023-01-28 RX ORDER — ONDANSETRON 4 MG/1
4 TABLET, ORALLY DISINTEGRATING ORAL
Qty: 20 TABLET | Refills: 0 | Status: SHIPPED | OUTPATIENT
Start: 2023-01-28

## 2023-01-28 RX ORDER — DOXYCYCLINE HYCLATE 100 MG
100 TABLET ORAL 2 TIMES DAILY
Qty: 14 TABLET | Refills: 0 | Status: SHIPPED | OUTPATIENT
Start: 2023-01-28 | End: 2023-02-04

## 2023-01-28 NOTE — ED TRIAGE NOTES
24 hours low grade temp 99.3, malaise. States she is on 12 day treatment of prednisone for MS. States she also has sinus pressure/ productive cough(clear sputum).  States she has chest tightness radiating to back since yesterday, in addition to lower back pain

## 2023-01-29 NOTE — DISCHARGE INSTRUCTIONS
Thank you! Thank you for allowing me to care for you in the emergency department. It is my goal to provide you with excellent care. If you have not received excellent quality care, please ask to speak to the nurse manager. Please fill out the survey that will come to you by mail or email since we listen to your feedback! Below you will find a list of your tests from today's visit. Should you have any questions, please do not hesitate to call the emergency department. Labs  No results found for this or any previous visit (from the past 12 hour(s)). Radiologic Studies  XR CHEST PORT   Final Result   No evidence of acute cardiopulmonary process. CT Results  (Last 48 hours)      None          CXR Results  (Last 48 hours)                 01/28/23 1920  XR CHEST PORT Final result    Impression:  No evidence of acute cardiopulmonary process. Narrative:  INDICATION:  productive cough        COMPARISON: November 6, 2022       FINDINGS: Single AP portable view of the chest obtained at 1917 demonstrates a   stable cardiomediastinal silhouette. The lungs are clear bilaterally. No osseous   abnormalities are seen. ------------------------------------------------------------------------------------------------------------  The exam and treatment you received in the Emergency Department were for an urgent problem and are not intended as complete care. It is important that you follow-up with a doctor, nurse practitioner, or physician assistant to:  (1) confirm your diagnosis,  (2) re-evaluation of changes in your illness and treatment, and  (3) for ongoing care. Please take your discharge instructions with you when you go to your follow-up appointment. If you have any problem arranging a follow-up appointment, contact the Emergency Department.   If your symptoms become worse or you do not improve as expected and you are unable to reach your health care provider, please return to the Emergency Department. We are available 24 hours a day. If a prescription has been provided, please have it filled as soon as possible to prevent a delay in treatment. If you have any questions or reservations about taking the medication due to side effects or interactions with other medications, please call your primary care provider or contact the ER.

## 2023-01-29 NOTE — ED PROVIDER NOTES
EMERGENCY DEPARTMENT HISTORY AND PHYSICAL EXAM      Date: 1/28/2023  Patient Name: Cody Pierson    History of Presenting Illness     Chief Complaint   Patient presents with    Fatigue    Ear Pain    Sore Throat     sinus    Sinus Pain    Chest Pain    Back Pain       History Provided By: Patient    HPI: Cody Pierson, 41 y.o. female presents to the ED with CC of 2 days history of \"low-grade fever of 99.3 degrees\" with associated malaise. Patient also reports the symptoms have been accompanied by cough that is increasingly productive of sputum. Patient also reports right ear pain and sinus congestion. Patient states she is on day 3 of a 12-day course of prednisone for her MS. Patient denies SOB, chest pain, or any neurological symptoms. There are no other complaints, changes, or physical findings at this time. Past History     Past Medical History:  Past Medical History:   Diagnosis Date    Anxiety     Diabetes ([facility] [address]     gestational    Fibromyalgia     Currently seeing a Neurologist to confirm diagnosis, MS, right arm pain    Ill-defined condition     Patient reports increased WBC's- has appointment with Oncologist    Migraines     MS (multiple sclerosis) ([facility] [address]        Allergies: Allergies   Allergen Reactions    Sulfa (Sulfonamide Antibiotics) Unable to Obtain     Migraine Headache    Vancomycin Other (comments)     Red streaks at injection site. Zoloft [Sertraline] Unable to Obtain     Anxious and passed out       Review of Systems   Vital signs and nursing notes reviewed  Review of Systems  Negative except as per HPI    Physical Exam   Visit Vitals  /77   Pulse 77   Temp 99.3 °F (37.4 °C)   Resp 18   Ht 5' 7\" (1.702 m)   Wt 77.1 kg (170 lb)   SpO2 100%   BMI 26.63 kg/m²     CONSTITUTIONAL: Alert, in no distress. Appears stated age. HEAD:  Normocephalic, atraumatic  EYES: EOM intact. No conjunctival injection or scleral icterus  Neck:  Supple.  No meningismus  RESP: Normal with no work of breathing, speaking in full sentences. CTAB  CV: Well perfused. NEURO: Alert with normal mentation, moving extremities spontaneously  PSYCH: Normal mood, normal affect      Medical Decision Making   Patient presents for flu-like symptoms with normal oxygen saturation, benign physical exam and mild URI symptoms or exposure. Influenza testing was not conducted. The patient was given supportive care recommendations and agrees with the plan to be discharged home. Tamiflu was not prescribed. They were provided instructions to return for difficulty breathing, chest pain, altered mentation, or any other new or worsening symptoms. ED Course:   Initial assessment performed. The patients presenting problems have been discussed, and they are in agreement with the care plan formulated and outlined with them. I have encouraged them to ask questions as they arise throughout their visit. Critical Care Time: None    Disposition:  DISCHARGE NOTE:  The pt is ready for discharge. The pt's signs, symptoms, diagnosis, and discharge instructions have been discussed and pt has conveyed their understanding. The pt is to follow up as recommended or return to ER should their symptoms worsen. Plan has been discussed and pt is in agreement. PLAN:  1. Current Discharge Medication List        2. Follow-up Information    None       3. Flu Testing results, if performed, will be called if positive. Patient should utilize [x+1]t to access results. 4. Take Tylenol or Ibuprofen as needed  5. Drink plenty of fluids  6. Return to ED if worse especially if any shortness of breath, chest pain or altered mentation. Diagnosis     Clinical Impression:   1. Acute bronchitis, complicated        Please note that this dictation was completed with "[employer] the computer voice recognition software.  Quite often unanticipated grammatical, syntax, homophones, and other interpretive errors are inadvertently transcribed by the computer software. Please disregards these errors. Please excuse any errors that have escaped final proofreading.

## 2023-02-03 ENCOUNTER — TELEPHONE (OUTPATIENT)
Dept: NEUROLOGY | Age: 48
End: 2023-02-03

## 2023-02-06 ENCOUNTER — APPOINTMENT (OUTPATIENT)
Dept: CT IMAGING | Age: 48
End: 2023-02-06
Attending: EMERGENCY MEDICINE
Payer: COMMERCIAL

## 2023-02-06 ENCOUNTER — HOSPITAL ENCOUNTER (OUTPATIENT)
Age: 48
Setting detail: OBSERVATION
Discharge: HOME HEALTH | End: 2023-02-08
Attending: EMERGENCY MEDICINE | Admitting: INTERNAL MEDICINE
Payer: COMMERCIAL

## 2023-02-06 ENCOUNTER — APPOINTMENT (OUTPATIENT)
Dept: MRI IMAGING | Age: 48
End: 2023-02-06
Attending: INTERNAL MEDICINE
Payer: COMMERCIAL

## 2023-02-06 DIAGNOSIS — G35 MULTIPLE SCLEROSIS (HCC): ICD-10-CM

## 2023-02-06 DIAGNOSIS — G45.9 TIA (TRANSIENT ISCHEMIC ATTACK): Primary | ICD-10-CM

## 2023-02-06 LAB
ALBUMIN SERPL-MCNC: 4.1 G/DL (ref 3.5–5)
ALBUMIN/GLOB SERPL: 1.3 (ref 1.1–2.2)
ALP SERPL-CCNC: 71 U/L (ref 45–117)
ALT SERPL-CCNC: 18 U/L (ref 12–78)
ANION GAP SERPL CALC-SCNC: 5 MMOL/L (ref 5–15)
APTT PPP: 25.9 SEC (ref 21.2–34.1)
AST SERPL W P-5'-P-CCNC: 7 U/L (ref 15–37)
BASOPHILS # BLD: 0.1 K/UL (ref 0–0.1)
BASOPHILS NFR BLD: 1 % (ref 0–1)
BILIRUB SERPL-MCNC: 0.3 MG/DL (ref 0.2–1)
BUN SERPL-MCNC: 6 MG/DL (ref 6–20)
BUN/CREAT SERPL: 8 (ref 12–20)
CA-I BLD-MCNC: 9.6 MG/DL (ref 8.5–10.1)
CHLORIDE SERPL-SCNC: 107 MMOL/L (ref 97–108)
CO2 SERPL-SCNC: 28 MMOL/L (ref 21–32)
CREAT SERPL-MCNC: 0.72 MG/DL (ref 0.55–1.02)
DIFFERENTIAL METHOD BLD: ABNORMAL
EOSINOPHIL # BLD: 0.2 K/UL (ref 0–0.4)
EOSINOPHIL NFR BLD: 2 % (ref 0–7)
ERYTHROCYTE [DISTWIDTH] IN BLOOD BY AUTOMATED COUNT: 13 % (ref 11.5–14.5)
GLOBULIN SER CALC-MCNC: 3.2 G/DL (ref 2–4)
GLUCOSE BLD STRIP.AUTO-MCNC: 101 MG/DL (ref 65–100)
GLUCOSE BLD STRIP.AUTO-MCNC: 102 MG/DL (ref 65–100)
GLUCOSE SERPL-MCNC: 110 MG/DL (ref 65–100)
HCT VFR BLD AUTO: 46 % (ref 35–47)
HGB BLD-MCNC: 15.6 G/DL (ref 11.5–16)
IMM GRANULOCYTES # BLD AUTO: 0.1 K/UL (ref 0–0.04)
IMM GRANULOCYTES NFR BLD AUTO: 0 % (ref 0–0.5)
INR PPP: 1 (ref 0.9–1.1)
LYMPHOCYTES # BLD: 3.7 K/UL (ref 0.8–3.5)
LYMPHOCYTES NFR BLD: 26 % (ref 12–49)
MCH RBC QN AUTO: 29.8 PG (ref 26–34)
MCHC RBC AUTO-ENTMCNC: 33.9 G/DL (ref 30–36.5)
MCV RBC AUTO: 88 FL (ref 80–99)
MONOCYTES # BLD: 0.6 K/UL (ref 0–1)
MONOCYTES NFR BLD: 4 % (ref 5–13)
NEUTS SEG # BLD: 9.6 K/UL (ref 1.8–8)
NEUTS SEG NFR BLD: 67 % (ref 32–75)
NRBC # BLD: 0 K/UL (ref 0–0.01)
NRBC BLD-RTO: 0 PER 100 WBC
PERFORMED BY, TECHID: ABNORMAL
PERFORMED BY, TECHID: ABNORMAL
PLATELET # BLD AUTO: 375 K/UL (ref 150–400)
PMV BLD AUTO: 10.9 FL (ref 8.9–12.9)
POTASSIUM SERPL-SCNC: 4.4 MMOL/L (ref 3.5–5.1)
PROT SERPL-MCNC: 7.3 G/DL (ref 6.4–8.2)
PROTHROMBIN TIME: 12.7 SEC (ref 11.9–14.6)
RBC # BLD AUTO: 5.23 M/UL (ref 3.8–5.2)
SODIUM SERPL-SCNC: 140 MMOL/L (ref 136–145)
THERAPEUTIC RANGE,PTTT: NORMAL SEC (ref 82–109)
TROPONIN I SERPL HS-MCNC: <4 NG/L (ref 0–51)
WBC # BLD AUTO: 14.2 K/UL (ref 3.6–11)

## 2023-02-06 PROCEDURE — 70551 MRI BRAIN STEM W/O DYE: CPT

## 2023-02-06 PROCEDURE — 80053 COMPREHEN METABOLIC PANEL: CPT

## 2023-02-06 PROCEDURE — 82962 GLUCOSE BLOOD TEST: CPT

## 2023-02-06 PROCEDURE — 85730 THROMBOPLASTIN TIME PARTIAL: CPT

## 2023-02-06 PROCEDURE — 84484 ASSAY OF TROPONIN QUANT: CPT

## 2023-02-06 PROCEDURE — 70496 CT ANGIOGRAPHY HEAD: CPT

## 2023-02-06 PROCEDURE — 70450 CT HEAD/BRAIN W/O DYE: CPT

## 2023-02-06 PROCEDURE — 74011250637 HC RX REV CODE- 250/637: Performed by: INTERNAL MEDICINE

## 2023-02-06 PROCEDURE — 74011250636 HC RX REV CODE- 250/636: Performed by: INTERNAL MEDICINE

## 2023-02-06 PROCEDURE — G0378 HOSPITAL OBSERVATION PER HR: HCPCS

## 2023-02-06 PROCEDURE — 99285 EMERGENCY DEPT VISIT HI MDM: CPT

## 2023-02-06 PROCEDURE — 74011000636 HC RX REV CODE- 636: Performed by: EMERGENCY MEDICINE

## 2023-02-06 PROCEDURE — 85610 PROTHROMBIN TIME: CPT

## 2023-02-06 PROCEDURE — 36415 COLL VENOUS BLD VENIPUNCTURE: CPT

## 2023-02-06 PROCEDURE — 85025 COMPLETE CBC W/AUTO DIFF WBC: CPT

## 2023-02-06 RX ORDER — ACETAMINOPHEN 650 MG/1
650 SUPPOSITORY RECTAL
Status: DISCONTINUED | OUTPATIENT
Start: 2023-02-06 | End: 2023-02-08 | Stop reason: HOSPADM

## 2023-02-06 RX ORDER — DEXTROSE MONOHYDRATE 100 MG/ML
0-250 INJECTION, SOLUTION INTRAVENOUS AS NEEDED
Status: DISCONTINUED | OUTPATIENT
Start: 2023-02-06 | End: 2023-02-08 | Stop reason: HOSPADM

## 2023-02-06 RX ORDER — HYDRALAZINE HYDROCHLORIDE 20 MG/ML
20 INJECTION INTRAMUSCULAR; INTRAVENOUS ONCE
Status: ACTIVE | OUTPATIENT
Start: 2023-02-06 | End: 2023-02-07

## 2023-02-06 RX ORDER — SUMATRIPTAN 25 MG/1
50 TABLET, FILM COATED ORAL
Status: ACTIVE | OUTPATIENT
Start: 2023-02-06 | End: 2023-02-07

## 2023-02-06 RX ORDER — IBUPROFEN 200 MG
4 TABLET ORAL AS NEEDED
Status: DISCONTINUED | OUTPATIENT
Start: 2023-02-06 | End: 2023-02-08 | Stop reason: HOSPADM

## 2023-02-06 RX ORDER — HYDRALAZINE HYDROCHLORIDE 20 MG/ML
10 INJECTION INTRAMUSCULAR; INTRAVENOUS
Status: DISCONTINUED | OUTPATIENT
Start: 2023-02-06 | End: 2023-02-08 | Stop reason: HOSPADM

## 2023-02-06 RX ORDER — HEPARIN SODIUM 5000 [USP'U]/ML
5000 INJECTION, SOLUTION INTRAVENOUS; SUBCUTANEOUS EVERY 8 HOURS
Status: DISCONTINUED | OUTPATIENT
Start: 2023-02-06 | End: 2023-02-07

## 2023-02-06 RX ORDER — GUAIFENESIN 100 MG/5ML
81 LIQUID (ML) ORAL DAILY
Status: DISCONTINUED | OUTPATIENT
Start: 2023-02-07 | End: 2023-02-07

## 2023-02-06 RX ORDER — IBUPROFEN 200 MG
4 TABLET ORAL AS NEEDED
Status: DISCONTINUED | OUTPATIENT
Start: 2023-02-06 | End: 2023-02-06 | Stop reason: SDUPTHER

## 2023-02-06 RX ORDER — LORAZEPAM 1 MG/1
2 TABLET ORAL ONCE
Status: COMPLETED | OUTPATIENT
Start: 2023-02-06 | End: 2023-02-06

## 2023-02-06 RX ORDER — CLOTRIMAZOLE AND BETAMETHASONE DIPROPIONATE 10; .64 MG/G; MG/G
CREAM TOPICAL 2 TIMES DAILY
Status: DISCONTINUED | OUTPATIENT
Start: 2023-02-06 | End: 2023-02-08 | Stop reason: HOSPADM

## 2023-02-06 RX ORDER — AMMONIUM LACTATE 12 G/100G
CREAM TOPICAL 2 TIMES DAILY
Status: DISCONTINUED | OUTPATIENT
Start: 2023-02-06 | End: 2023-02-08 | Stop reason: HOSPADM

## 2023-02-06 RX ORDER — LORAZEPAM 2 MG/ML
2 INJECTION INTRAMUSCULAR ONCE
Status: DISCONTINUED | OUTPATIENT
Start: 2023-02-06 | End: 2023-02-06

## 2023-02-06 RX ORDER — INSULIN LISPRO 100 [IU]/ML
INJECTION, SOLUTION INTRAVENOUS; SUBCUTANEOUS
Status: DISCONTINUED | OUTPATIENT
Start: 2023-02-06 | End: 2023-02-08 | Stop reason: HOSPADM

## 2023-02-06 RX ORDER — BACLOFEN 10 MG/1
10 TABLET ORAL 3 TIMES DAILY
Status: DISCONTINUED | OUTPATIENT
Start: 2023-02-06 | End: 2023-02-08 | Stop reason: HOSPADM

## 2023-02-06 RX ORDER — DEXTROSE MONOHYDRATE 100 MG/ML
0-250 INJECTION, SOLUTION INTRAVENOUS AS NEEDED
Status: DISCONTINUED | OUTPATIENT
Start: 2023-02-06 | End: 2023-02-06 | Stop reason: SDUPTHER

## 2023-02-06 RX ORDER — ACETAMINOPHEN 325 MG/1
650 TABLET ORAL
Status: DISCONTINUED | OUTPATIENT
Start: 2023-02-06 | End: 2023-02-08 | Stop reason: HOSPADM

## 2023-02-06 RX ORDER — ATORVASTATIN CALCIUM 40 MG/1
40 TABLET, FILM COATED ORAL
Status: DISCONTINUED | OUTPATIENT
Start: 2023-02-06 | End: 2023-02-07

## 2023-02-06 RX ORDER — INSULIN LISPRO 100 [IU]/ML
INJECTION, SOLUTION INTRAVENOUS; SUBCUTANEOUS
Status: DISCONTINUED | OUTPATIENT
Start: 2023-02-06 | End: 2023-02-06 | Stop reason: SDUPTHER

## 2023-02-06 RX ADMIN — IOPAMIDOL 100 ML: 755 INJECTION, SOLUTION INTRAVENOUS at 14:18

## 2023-02-06 RX ADMIN — ATORVASTATIN CALCIUM 40 MG: 40 TABLET, FILM COATED ORAL at 21:29

## 2023-02-06 RX ADMIN — LORAZEPAM 2 MG: 1 TABLET ORAL at 16:49

## 2023-02-06 RX ADMIN — HEPARIN SODIUM 5000 UNITS: 5000 INJECTION INTRAVENOUS; SUBCUTANEOUS at 21:30

## 2023-02-06 RX ADMIN — BACLOFEN 10 MG: 10 TABLET ORAL at 21:29

## 2023-02-06 NOTE — ROUTINE PROCESS
TRANSFER - OUT REPORT:    Verbal report given to [facility] (name) on Elena Charles  being transferred to [facility](unit) for routine progression of care       Report consisted of patients Situation, Background, Assessment and   Recommendations(SBAR). Information from the following report(s) SBAR and ED Summary was reviewed with the receiving nurse. Lines:   Peripheral IV 02/06/23 Left Antecubital (Active)        Opportunity for questions and clarification was provided.       Patient transported with:   [employer]

## 2023-02-06 NOTE — PROGRESS NOTES
Reason for Admission:  TIA                     RUR Score:   N/A                  Plan for utilizing home health:   None @ this time/uses no DME. PCP: First and Last name:  Lorenzo Fernandez DO     Name of Practice:    Are you a current patient: Yes/No: Yes   Approximate date of last visit: Seen in January. Can you participate in a virtual visit with your PCP: Yes/sharon/Has cell phone. Current Advanced Directive/Advance Care Plan: Prior      Healthcare Decision Maker:              Primary Decision Maker: Roman Duran - Josefrienmonie - [phone]                  Transition of Care Plan:                    D/C Plan is home with boyfriend  & BF to transport home. Send Rxs to [facility] in [facility] upon discharge.

## 2023-02-06 NOTE — ED NOTES
Notified KAVIN Astorga that Pt got ativan 10 minutes ago. She advised she will come get Pt in 20 minutes.

## 2023-02-06 NOTE — ED PROVIDER NOTES
[facility] EMERGENCY DEPT  EMERGENCY DEPARTMENT HISTORY AND PHYSICAL EXAM      Date: (Not on file)  Patient Name: Jon Cruz  MRN: [id number]  [place] 1975  Date of evaluation: 2/6/2023  Provider: Comfort Garcia DO   Note Started: 1:53 PM 2/6/23  History of Presenting Illness     Chief Complaint   Patient presents with    Dizziness     History Provided By: Patient    HPI: Jon Cruz, 41 y.o. female with history of anxiety, diabetes, fibromyalgia, and multiple sclerosis who presents with difficulty with her balance, dizziness, right-sided facial numbness, and weakness to the right arm. Symptoms started this morning around 10 AM.  She did just finish steroid treatment for multiple sclerosis flare yesterday. States that she felt well this morning. There are no other complaints, changes, or physical findings at this time. Current Outpatient Medications   Medication Sig Dispense Refill    ondansetron (ZOFRAN ODT) 4 mg disintegrating tablet Take 1 Tablet by mouth every eight (8) hours as needed for Nausea or Vomiting. 20 Tablet 0    predniSONE (DELTASONE) 10 mg tablet Take on a sequential daily basis as follows till completion: 8-6-7-9-2-5-3-3-2-2-1-1 -stop. over a 12 day course. 42 Tablet 0    Kesimpta Pen 20 mg/0.4 mL pnij 20 mg by SubCUTAneous route See Admin Instructions. Indications: relapsing form of multiple sclerosis 6 Each 1    baclofen (LIORESAL) 10 mg tablet Take 1 Tablet by mouth three (3) times daily. 90 Tablet 3    diphenhydrAMINE (BenadryL) 25 mg capsule Take 1 Capsule by mouth every six (6) hours as needed for Itching. 20 Capsule 0    buPROPion SR (WELLBUTRIN SR) 150 mg SR tablet TAKE ONE TABLET BY MOUTH DAILY FOR 3 DAYS, THEN INCREASE TO 1 TABLET TWICE DAILY (Patient not taking: No sig reported)      clotrimazole-betamethasone (LOTRISONE) topical cream Apply  to affected area two (2) times a day.  45 g 0    ammonium lactate (AMLACTIN) 12 % topical cream Apply  to affected area two (2) times a day. rub in to affected area well 280 g 2    metFORMIN (GLUCOPHAGE) 500 mg tablet Take 500 mg by mouth two (2) times daily (with meals). SUMAtriptan (IMITREX) 50 mg tablet Take 50 mg by mouth once as needed for Migraine. ALPRAZolam (XANAX) 1 mg tablet Take 0.5 mg by mouth daily as needed. Past History   Past Medical History:  Past Medical History:   Diagnosis Date    Anxiety     Diabetes ([facility] [address]     gestational    Fibromyalgia     Currently seeing a Neurologist to confirm diagnosis, MS, right arm pain    Ill-defined condition     Patient reports increased WBC's- has appointment with Oncologist    Migraines     MS (multiple sclerosis) ([facility] [address]        Past Surgical History:  Past Surgical History:   Procedure Laterality Date    HX CHOLECYSTECTOMY      HX GYN      hysterectomy    HX OOPHORECTOMY Left     january 2022    HX ORTHOPAEDIC      toe, bilateral big toes    HX TUBAL LIGATION      HX WISDOM TEETH EXTRACTION Bilateral     Bottoms only       Family History:  Family History   Problem Relation Age of Onset    Lupus Mother     Coronary Art Dis Other     Heart Disease Other         MI    Diabetes Other     Lung Cancer Other     Breast Cancer Maternal Aunt     Stomach Cancer Maternal Grandfather     Coronary Art Dis Maternal Grandfather     Diabetes Maternal Grandfather     Prostate Cancer Paternal Grandfather     Coronary Art Dis Paternal Grandfather     Diabetes Paternal Grandfather     Coronary Art Dis Father     Diabetes Father        Social History:  Social History     Tobacco Use    Smoking status: Every Day     Packs/day: 0.50     Years: 15.00     Pack years: 7.50     Types: Cigarettes    Smokeless tobacco: Never   Vaping Use    Vaping Use: Some days    Substances: Nicotine   Substance Use Topics    Alcohol use: Yes     Comment: Rarely    Drug use: Never       Allergies:   Allergies   Allergen Reactions    Sulfa (Sulfonamide Antibiotics) Unable to Obtain     Migraine Headache    Vancomycin Other (comments)     Red streaks at injection site. Zoloft [Sertraline] Unable to Obtain     Anxious and passed out       PCP: Mackenzie Nava DO    Current Meds:   Previous Medications    ALPRAZOLAM (XANAX) 1 MG TABLET    Take 0.5 mg by mouth daily as needed. AMMONIUM LACTATE (AMLACTIN) 12 % TOPICAL CREAM    Apply  to affected area two (2) times a day. rub in to affected area well    BACLOFEN (LIORESAL) 10 MG TABLET    Take 1 Tablet by mouth three (3) times daily. BUPROPION SR (WELLBUTRIN SR) 150 MG SR TABLET    TAKE ONE TABLET BY MOUTH DAILY FOR 3 DAYS, THEN INCREASE TO 1 TABLET TWICE DAILY    CLOTRIMAZOLE-BETAMETHASONE (LOTRISONE) TOPICAL CREAM    Apply  to affected area two (2) times a day. DIPHENHYDRAMINE (BENADRYL) 25 MG CAPSULE    Take 1 Capsule by mouth every six (6) hours as needed for Itching. KESIMPTA PEN 20 MG/0.4 ML PNIJ    20 mg by SubCUTAneous route See Admin Instructions. Indications: relapsing form of multiple sclerosis    METFORMIN (GLUCOPHAGE) 500 MG TABLET    Take 500 mg by mouth two (2) times daily (with meals). ONDANSETRON (ZOFRAN ODT) 4 MG DISINTEGRATING TABLET    Take 1 Tablet by mouth every eight (8) hours as needed for Nausea or Vomiting. PREDNISONE (DELTASONE) 10 MG TABLET    Take on a sequential daily basis as follows till completion: 4-6-1-2-4-1-3-3-2-2-1-1 -stop. over a 12 day course. SUMATRIPTAN (IMITREX) 50 MG TABLET    Take 50 mg by mouth once as needed for Migraine. Review of Systems   ROS  Review of Systems   Positives and pertinent negatives as per HPI. Physical Exam   Vitals  ED Triage Vitals [02/06/23 1344]   ED Encounter Vitals Group      BP (!) 197/120      Pulse (Heart Rate) (!) 109      Resp Rate 18      Temp       Temp src       SpO2       Weight       Height         Exam  Constitutional: Anxious and crying but otherwise nontoxic. Well-nourished. Skin: No rash. ENT: No rhinorrhea. No cough. Head is normocephalic and atraumatic.   Eye: No proptosis or conjunctival injections. Respiratory: No apparent respiratory distress. Gastrointestinal: Nondistended. Musculoskeletal: No obvious bony deformities. Neuro: Cranial nerves II through XII grossly intact. Normal strength in upper and lower extremities. No facial asymmetry. No slurred speech. Patient does have some mild balance issues with ambulation however she still does ambulate well. No obvious ataxia. Minimal dysmetria with finger-nose testing in the right hand. Normal testing of finger-nose on the left. SCREENINGS               No data recorded      Lab and Diagnostic Study Results   Labs -     Recent Results (from the past 12 hour(s))   GLUCOSE, POC    Collection Time: 02/06/23  1:48 PM   Result Value Ref Range    Glucose (POC) 101 (H) 65 - 100 mg/dL    Performed by Cierra Wong          Radiologic Studies:  Non-plain film images such as CT, Ultrasound and MRI are read by the radiologist. Plain radiographic images are visualized and preliminarily interpreted by the ED Provider with the below findings:      Interpretation per the radiologist below, if available at the time of this note:  No results found. [unfilled]  CT Results  (Last 48 hours)      None          CXR Results  (Last 48 hours)      None          MDM (EMERGENCY DEPARTMENT COURSE and DIFFERENTIAL DIAGNOSIS)   - I am the first and primary provider for this patient AND AM THE PRIMARY PROVIDER OF RECORD. I reviewed the vital signs, available nursing notes, past medical history, past surgical history, family history and social history. - Initial assessment performed. The patients presenting problems have been discussed, and the staff are in agreement with the care plan formulated and outlined with them. I have encouraged them to ask questions as they arise throughout their visit.     Vitals:    Vitals:    02/06/23 1344   BP: (!) 197/120   Pulse: (!) 109   Resp: 18         Patient was given the following medications:  Medications - No data to display    CONSULTS: (who and what was discussed)  Neurologist -recommended MRI of the head and neck with contrast and admission for stroke work-up versus MS flare      Chronic Conditions: ms  Social Determinants affecting Dx or Tx: None  Counseling: n/a    Records Reviewed (source and summary of external notes): None    CC/HPI Summary: Presented with possible strokelike symptoms versus MS flare. See above in HPI for detail. DDx: Multiple sclerosis flare, TIA, stroke, anxiety, conversion disorder  ED Course and Reassessment: Work-up does not show any acute pathology but does show signs of MS. Discussed with neurology    Admitted to hospitalist      Procedures   Unless otherwise noted below, none  Performed by: Khadijah Breen DO   Procedures      Procedures:     CRITICAL CARE TIME   CRITICAL CARE NOTE :  CRITICAL CARE:  I personally spent a total of 40 minutes of critical care time in obtaining history, performing a physical exam, bedside monitoring of interventions, collecting and interpreting tests but excluding time spent performing procedures, treating other patients and teaching time. Discussion with consultant: neurology. Clinical Concern: CVA vs TIA vs MS flare. Intervention: admission. JUDITH Rolle DO   Disposition/Plan   Disposition: admitted    Clinical Impression   Clinical Impression:   1. TIA (transient ischemic attack)    2. Multiple sclerosis ([address]        Attestations:  Khadijah Breen DO    I am the Primary Clinician of Record. Khadijah Breen DO (electronically signed)    (Please note that parts of this dictation were completed with voice recognition software. Quite often unanticipated grammatical, syntax, homophones, and other interpretive errors are inadvertently transcribed by the computer software. Please disregards these errors.  Please excuse any errors that have escaped final proofreading.)

## 2023-02-06 NOTE — ACP (ADVANCE CARE PLANNING)
Advance Care Planning   Healthcare Decision Maker:       Primary Decision Maker: Jacobo Malik - [phone]

## 2023-02-06 NOTE — H&P
GENERAL GENERIC H&P/CONSULT  Presenting complaint-numbness/tingling    Subjective: 44-year-old female with past medical history of multiple comorbidities presents to [facility] with complaints of dizziness/numbness/tingling. Of note patient has a history of multiple sclerosis, just completed her steroid course after an MS flare, patient states once earlier this a.m. she had sudden onset intermittent dizziness as well as numbness/tingling of her right lower face as well as numbness/tingling of her right upper extremity following which patient presented to the ED. Of note the symptoms continue to persist, patient states these are not similar to her normal symptoms of MS flare. Patient denies any fevers chills nausea vomiting lightheadedness dizziness dyspnea orthopnea paroxysmal nocturnal dyspnea chest pain palpitations headache auditory or visual symptoms abdominal stool or urinary complaints or any other associated symptoms. Patient endorses no recent sick contacts or travel activity    Past Medical History:   Diagnosis Date    Anxiety     Diabetes ([facility] [address]     gestational    Fibromyalgia     Currently seeing a Neurologist to confirm diagnosis, MS, right arm pain    Ill-defined condition     Patient reports increased WBC's- has appointment with Oncologist    Migraines     MS (multiple sclerosis) ([facility] [address]       Past Surgical History:   Procedure Laterality Date    HX CHOLECYSTECTOMY      HX GYN      hysterectomy    HX OOPHORECTOMY Left     january 2022    HX ORTHOPAEDIC      toe, bilateral big toes    HX TUBAL LIGATION      HX WISDOM TEETH EXTRACTION Bilateral     Bottoms only      Prior to Admission medications    Medication Sig Start Date End Date Taking?  Authorizing Provider   ondansetron (ZOFRAN ODT) 4 mg disintegrating tablet Take 1 Tablet by mouth every eight (8) hours as needed for Nausea or Vomiting. 1/28/23   Riley Abad MD   predniSONE (DELTASONE) 10 mg tablet Take on a sequential daily basis as follows till completion: 4-0-1-8-1-0-3-3-2-2-1-1 -stop. over a 12 day course. 1/26/23   Cheryl Briggs MD   Kesimpta Pen 20 mg/0.4 mL pnij 20 mg by SubCUTAneous route See Admin Instructions. Indications: relapsing form of multiple sclerosis 12/30/22   Cheryl Briggs MD   baclofen (LIORESAL) 10 mg tablet Take 1 Tablet by mouth three (3) times daily. 12/9/22   Cheryl Briggs MD   diphenhydrAMINE (BenadryL) 25 mg capsule Take 1 Capsule by mouth every six (6) hours as needed for Itching. 11/6/22   Justa Valladares MD   buPROPion SR (WELLBUTRIN SR) 150 mg SR tablet TAKE ONE TABLET BY MOUTH DAILY FOR 3 DAYS, THEN INCREASE TO 1 TABLET TWICE DAILY  Patient not taking: No sig reported 10/6/22   Provider, Historical   clotrimazole-betamethasone (LOTRISONE) topical cream Apply  to affected area two (2) times a day. 4/6/22   Taina Patino DPM   ammonium lactate (AMLACTIN) 12 % topical cream Apply  to affected area two (2) times a day. rub in to affected area well 4/6/22   Taina Patino DPM   metFORMIN (GLUCOPHAGE) 500 mg tablet Take 500 mg by mouth two (2) times daily (with meals). Provider, Historical   SUMAtriptan (IMITREX) 50 mg tablet Take 50 mg by mouth once as needed for Migraine. Provider, Historical   ALPRAZolam (XANAX) 1 mg tablet Take 0.5 mg by mouth daily as needed. Provider, Historical     Allergies   Allergen Reactions    Sulfa (Sulfonamide Antibiotics) Unable to Obtain     Migraine Headache    Vancomycin Other (comments)     Red streaks at injection site.      Zoloft [Sertraline] Unable to Obtain     Anxious and passed out      Social History     Tobacco Use    Smoking status: Every Day     Packs/day: 0.50     Years: 15.00     Pack years: 7.50     Types: Cigarettes    Smokeless tobacco: Never   Substance Use Topics    Alcohol use: Yes     Comment: Rarely      Family History   Problem Relation Age of Onset    Lupus Mother     Coronary Art Dis Other     Heart Disease Other         MI    Diabetes Other     Lung Cancer Other     Breast Cancer Maternal Aunt     Stomach Cancer Maternal Grandfather     Coronary Art Dis Maternal Grandfather     Diabetes Maternal Grandfather     Prostate Cancer Paternal Grandfather     Coronary Art Dis Paternal Grandfather     Diabetes Paternal Grandfather     Coronary Art Dis Father     Diabetes Father       Review of Systems   Constitutional:  Positive for activity change, appetite change and fatigue. Negative for chills, diaphoresis, fever and unexpected weight change. HENT:  Negative for congestion, dental problem, drooling, ear discharge, ear pain, facial swelling, hearing loss, mouth sores, nosebleeds, postnasal drip, rhinorrhea, sinus pressure, sinus pain, sneezing, sore throat, tinnitus, trouble swallowing and voice change. Eyes:  Negative for photophobia, pain, discharge, redness, itching and visual disturbance. Respiratory:  Negative for apnea, cough, choking, chest tightness, shortness of breath, wheezing and stridor. Cardiovascular:  Negative for chest pain, palpitations and leg swelling. Gastrointestinal:  Negative for abdominal distention, abdominal pain, anal bleeding, blood in stool, constipation, diarrhea, nausea, rectal pain and vomiting. Endocrine: Negative for cold intolerance, heat intolerance, polydipsia, polyphagia and polyuria. Genitourinary:  Negative for decreased urine volume, difficulty urinating, dyspareunia, dysuria, enuresis, flank pain, frequency, genital sores, hematuria, menstrual problem, pelvic pain, urgency, vaginal bleeding, vaginal discharge and vaginal pain. Musculoskeletal:  Negative for arthralgias, back pain, gait problem, joint swelling, myalgias, neck pain and neck stiffness. Skin:  Negative for color change, pallor, rash and wound. Allergic/Immunologic: Negative for environmental allergies, food allergies and immunocompromised state.    Neurological:  Positive for dizziness, weakness and numbness. Negative for tremors, seizures, syncope, facial asymmetry, speech difficulty, light-headedness and headaches. Hematological:  Negative for adenopathy. Does not bruise/bleed easily. Psychiatric/Behavioral:  Negative for agitation, behavioral problems, confusion, decreased concentration, dysphoric mood, hallucinations, self-injury, sleep disturbance and suicidal ideas. The patient is not nervous/anxious and is not hyperactive. Objective:    No intake/output data recorded. No intake/output data recorded. Patient Vitals for the past 8 hrs:   BP Pulse Resp   02/06/23 1344 (!) 197/120 (!) 109 18     Physical Exam  Vitals reviewed. Constitutional:       General: She is not in acute distress. Appearance: She is normal weight. She is ill-appearing. She is not toxic-appearing or diaphoretic. HENT:      Head: Normocephalic and atraumatic. Nose: Nose normal. No congestion or rhinorrhea. Mouth/Throat:      Mouth: Mucous membranes are moist.      Pharynx: Oropharynx is clear. No oropharyngeal exudate or posterior oropharyngeal erythema. Eyes:      General: No scleral icterus. Right eye: No discharge. Left eye: No discharge. Extraocular Movements: Extraocular movements intact. Conjunctiva/sclera: Conjunctivae normal.      Pupils: Pupils are equal, round, and reactive to light. Neck:      Vascular: No carotid bruit. Cardiovascular:      Rate and Rhythm: Normal rate and regular rhythm. Pulses: Normal pulses. Heart sounds: Normal heart sounds. No murmur heard. No friction rub. No gallop. Pulmonary:      Effort: Pulmonary effort is normal. No respiratory distress. Breath sounds: Normal breath sounds. No stridor. No wheezing, rhonchi or rales. Chest:      Chest wall: No tenderness. Abdominal:      General: Abdomen is flat. Bowel sounds are normal. There is no distension. Palpations: Abdomen is soft. There is no mass. Tenderness: There is no abdominal tenderness. There is no right CVA tenderness, left CVA tenderness, guarding or rebound. Hernia: No hernia is present. Musculoskeletal:         General: No swelling, tenderness, deformity or signs of injury. Normal range of motion. Cervical back: Normal range of motion and neck supple. No rigidity or tenderness. Right lower leg: No edema. Left lower leg: No edema. Lymphadenopathy:      Cervical: No cervical adenopathy. Skin:     General: Skin is warm. Capillary Refill: Capillary refill takes less than 2 seconds. Coloration: Skin is not jaundiced or pale. Findings: No bruising, erythema, lesion or rash. Neurological:      General: No focal deficit present. Mental Status: She is alert and oriented to person, place, and time. Mental status is at baseline. Cranial Nerves: No cranial nerve deficit. Sensory: Sensory deficit present. Motor: No weakness. Coordination: Coordination normal.      Gait: Gait normal.      Deep Tendon Reflexes: Reflexes normal.   Psychiatric:         Mood and Affect: Mood normal.         Behavior: Behavior normal.         Thought Content:  Thought content normal.         Judgment: Judgment normal.        Labs:    Recent Results (from the past 24 hour(s))   GLUCOSE, POC    Collection Time: 02/06/23  1:48 PM   Result Value Ref Range    Glucose (POC) 101 (H) 65 - 100 mg/dL    Performed by Fang Lock    TROPONIN-HIGH SENSITIVITY    Collection Time: 02/06/23  1:55 PM   Result Value Ref Range    Troponin-High Sensitivity <4 0 - 51 ng/L   CBC WITH AUTOMATED DIFF    Collection Time: 02/06/23  1:55 PM   Result Value Ref Range    WBC 14.2 (H) 3.6 - 11.0 K/uL    RBC 5.23 (H) 3.80 - 5.20 M/uL    HGB 15.6 11.5 - 16.0 g/dL    HCT 46.0 35.0 - 47.0 %    MCV 88.0 80.0 - 99.0 FL    MCH 29.8 26.0 - 34.0 PG    MCHC 33.9 30.0 - 36.5 g/dL    RDW 13.0 11.5 - 14.5 %    PLATELET 334 474 - 038 K/uL    MPV 10.9 8.9 - 12.9 FL    NRBC 0.0 0.0  WBC    ABSOLUTE NRBC 0.00 0.00 - 0.01 K/uL    NEUTROPHILS 67 32 - 75 %    LYMPHOCYTES 26 12 - 49 %    MONOCYTES 4 (L) 5 - 13 %    EOSINOPHILS 2 0 - 7 %    BASOPHILS 1 0 - 1 %    IMMATURE GRANULOCYTES 0 0 - 0.5 %    ABS. NEUTROPHILS 9.6 (H) 1.8 - 8.0 K/UL    ABS. LYMPHOCYTES 3.7 (H) 0.8 - 3.5 K/UL    ABS. MONOCYTES 0.6 0.0 - 1.0 K/UL    ABS. EOSINOPHILS 0.2 0.0 - 0.4 K/UL    ABS. BASOPHILS 0.1 0.0 - 0.1 K/UL    ABS. IMM. GRANS. 0.1 (H) 0.00 - 0.04 K/UL    DF AUTOMATED     METABOLIC PANEL, COMPREHENSIVE    Collection Time: 02/06/23  1:55 PM   Result Value Ref Range    Sodium 140 136 - 145 mmol/L    Potassium 4.4 3.5 - 5.1 mmol/L    Chloride 107 97 - 108 mmol/L    CO2 28 21 - 32 mmol/L    Anion gap 5 5 - 15 mmol/L    Glucose 110 (H) 65 - 100 mg/dL    BUN 6 6 - 20 mg/dL    Creatinine 0.72 0.55 - 1.02 mg/dL    BUN/Creatinine ratio 8 (L) 12 - 20      eGFR >60 >60 ml/min/1.73m2    Calcium 9.6 8.5 - 10.1 mg/dL    Bilirubin, total 0.3 0.2 - 1.0 mg/dL    AST (SGOT) 7 (L) 15 - 37 U/L    ALT (SGPT) 18 12 - 78 U/L    Alk. phosphatase 71 45 - 117 U/L    Protein, total 7.3 6.4 - 8.2 g/dL    Albumin 4.1 3.5 - 5.0 g/dL    Globulin 3.2 2.0 - 4.0 g/dL    A-G Ratio 1.3 1.1 - 2.2     PTT    Collection Time: 02/06/23  1:55 PM   Result Value Ref Range    aPTT 25.9 21.2 - 34.1 sec    aPTT, therapeutic range   82 - 109 sec   PROTHROMBIN TIME + INR    Collection Time: 02/06/23  1:55 PM   Result Value Ref Range    Prothrombin time 12.7 11.9 - 14.6 sec    INR 1.0 0.9 - 1.1         ECG: nonspecific ST and T waves changes   CT head:IMPRESSION  1. Hyperdense lesion in the right parietal deep white matter, which may  represent demyelinating disease given orientation. Recommend MRI brain without  and with contrast MS protocol for further evaluation. 2. No evidence of acute hemorrhage. CTA head and neck:CTA Head:  1. No evidence of significant stenosis or aneurysm.   2. Redemonstrated indeterminant hypodensity in the right parietal deep white  matter, which may represent demyelinating disease given orientation. Recommend  MRI brain without and with contrast MS protocol for further evaluation. CTA Neck:  1. No evidence of significant stenosis. Assessment:  Active Problems:    Multiple sclerosis ([address] (2/6/2023)      TIA (transient ischemic attack) (2/6/2023)        Plan:    Transient ischemic attack/cerebrovascular accident/multiple sclerosis flare-patient presents with above-mentioned symptomatology and based on patient's clinical presentation, could be secondary to TIA versus CVA versus possible MS flare however patient has just completed a steroid taper  Aspirin was daily  Lipitor once daily  Obtain MRI brain  Transthoracic echo to rule out PFO  CT angio head and neck reviewed  Aspiration seizure precautions  Occupational Therapy physical therapy evaluation  Neurology consult for evaluation    Hyperglycemia type 2 diabetes-insulin sliding scale    Hyperlipidemia-continue statin    Prophylaxis-Lovenox  FEN-cardiac/diabetic diet, replete potassium and magnesium  Full code, will clarify about surrogate decision-maker, admitted to telemetry for observation    Signed:   Shyam Allen MD 2/6/2023

## 2023-02-06 NOTE — ED NOTES
MRI at bedside. Pt verbalized to MRI that she is claustrophobic and cannot go to MRI without \"ativan. \" Jermaine Almazan MD.

## 2023-02-06 NOTE — ED TRIAGE NOTES
States noticed right facial numbness approx 1030 this morning, states dizziness. Writer noted right side weakness on arms and legs, noticed right side facial numbness on right side.  Stroke alert activated, ,

## 2023-02-07 ENCOUNTER — APPOINTMENT (OUTPATIENT)
Dept: MRI IMAGING | Age: 48
End: 2023-02-07
Attending: STUDENT IN AN ORGANIZED HEALTH CARE EDUCATION/TRAINING PROGRAM
Payer: COMMERCIAL

## 2023-02-07 ENCOUNTER — APPOINTMENT (OUTPATIENT)
Dept: NON INVASIVE DIAGNOSTICS | Age: 48
End: 2023-02-07
Attending: INTERNAL MEDICINE
Payer: COMMERCIAL

## 2023-02-07 LAB
ECHO AO ASC DIAM: 2.5 CM
ECHO AO ROOT DIAM: 2.5 CM
ECHO AV AREA PEAK VELOCITY: 2.3 CM2
ECHO AV AREA VTI: 2.4 CM2
ECHO AV MEAN GRADIENT: 4 MMHG
ECHO AV MEAN VELOCITY: 0.9 M/S
ECHO AV PEAK GRADIENT: 9 MMHG
ECHO AV PEAK VELOCITY: 1.5 M/S
ECHO AV VELOCITY RATIO: 0.67
ECHO AV VTI: 26 CM
ECHO EST RA PRESSURE: 3 MMHG
ECHO LA AREA 2C: 11.7 CM2
ECHO LA AREA 4C: 13.6 CM2
ECHO LA DIAMETER: 3.2 CM
ECHO LA MAJOR AXIS: 4.3 CM
ECHO LA MINOR AXIS: 4 CM
ECHO LA TO AORTIC ROOT RATIO: 1.28
ECHO LA VOL 4C: 31 ML (ref 22–52)
ECHO LV E' LATERAL VELOCITY: 8 CM/S
ECHO LV E' SEPTAL VELOCITY: 9 CM/S
ECHO LV EDV A2C: 44 ML
ECHO LV EDV A4C: 60 ML
ECHO LV EJECTION FRACTION A2C: 61 %
ECHO LV EJECTION FRACTION A4C: 75 %
ECHO LV EJECTION FRACTION BIPLANE: 69 % (ref 55–100)
ECHO LV ESV A2C: 17 ML
ECHO LV ESV A4C: 15 ML
ECHO LV FRACTIONAL SHORTENING: 33 % (ref 28–44)
ECHO LV INTERNAL DIMENSION DIASTOLIC: 4 CM (ref 3.9–5.3)
ECHO LV INTERNAL DIMENSION SYSTOLIC: 2.7 CM
ECHO LV IVSD: 0.8 CM (ref 0.6–0.9)
ECHO LV MASS 2D: 101.4 G (ref 67–162)
ECHO LV POSTERIOR WALL DIASTOLIC: 0.9 CM (ref 0.6–0.9)
ECHO LV RELATIVE WALL THICKNESS RATIO: 0.45
ECHO LVOT AREA: 3.5 CM2
ECHO LVOT AV VTI INDEX: 0.69
ECHO LVOT DIAM: 2.1 CM
ECHO LVOT MEAN GRADIENT: 2 MMHG
ECHO LVOT PEAK GRADIENT: 4 MMHG
ECHO LVOT PEAK VELOCITY: 1 M/S
ECHO LVOT SV: 62 ML
ECHO LVOT VTI: 17.9 CM
ECHO MV A VELOCITY: 0.66 M/S
ECHO MV AREA VTI: 2 CM2
ECHO MV E VELOCITY: 0.88 M/S
ECHO MV E/A RATIO: 1.33
ECHO MV E/E' LATERAL: 11
ECHO MV E/E' RATIO (AVERAGED): 10.39
ECHO MV E/E' SEPTAL: 9.78
ECHO MV LVOT VTI INDEX: 1.74
ECHO MV MAX VELOCITY: 1.1 M/S
ECHO MV MEAN GRADIENT: 1 MMHG
ECHO MV MEAN VELOCITY: 0.5 M/S
ECHO MV PEAK GRADIENT: 4 MMHG
ECHO MV REGURGITANT PEAK GRADIENT: 5 MMHG
ECHO MV REGURGITANT PEAK VELOCITY: 1.1 M/S
ECHO MV VTI: 31.1 CM
ECHO PULMONARY ARTERY SYSTOLIC PRESSURE (PASP): 15 MMHG
ECHO PV MAX VELOCITY: 1.1 M/S
ECHO PV MEAN GRADIENT: 2 MMHG
ECHO PV MEAN VELOCITY: 0.6 M/S
ECHO PV PEAK GRADIENT: 4 MMHG
ECHO PV VTI: 21.9 CM
ECHO RIGHT VENTRICULAR SYSTOLIC PRESSURE (RVSP): 15 MMHG
ECHO RV BASAL DIMENSION: 3 CM
ECHO RV FREE WALL PEAK S': 13 CM/S
ECHO RV INTERNAL DIMENSION: 3.5 CM
ECHO RV MID DIMENSION: 1.9 CM
ECHO RV TAPSE: 2.4 CM (ref 1.7–?)
ECHO TV REGURGITANT MAX VELOCITY: 1.73 M/S
ECHO TV REGURGITANT PEAK GRADIENT: 12 MMHG
ERYTHROCYTE [DISTWIDTH] IN BLOOD BY AUTOMATED COUNT: 12.8 % (ref 11.5–14.5)
EST. AVERAGE GLUCOSE BLD GHB EST-MCNC: 140 MG/DL
GLUCOSE BLD STRIP.AUTO-MCNC: 128 MG/DL (ref 65–100)
GLUCOSE BLD STRIP.AUTO-MCNC: 139 MG/DL (ref 65–100)
GLUCOSE BLD STRIP.AUTO-MCNC: 143 MG/DL (ref 65–100)
GLUCOSE BLD STRIP.AUTO-MCNC: 156 MG/DL (ref 65–100)
GLUCOSE BLD STRIP.AUTO-MCNC: 200 MG/DL (ref 65–100)
HBA1C MFR BLD: 6.5 % (ref 4–5.6)
HCT VFR BLD AUTO: 41.4 % (ref 35–47)
HGB BLD-MCNC: 14 G/DL (ref 11.5–16)
INR PPP: 1 (ref 0.9–1.1)
MCH RBC QN AUTO: 29.5 PG (ref 26–34)
MCHC RBC AUTO-ENTMCNC: 33.8 G/DL (ref 30–36.5)
MCV RBC AUTO: 87.3 FL (ref 80–99)
NRBC # BLD: 0 K/UL (ref 0–0.01)
NRBC BLD-RTO: 0 PER 100 WBC
PERFORMED BY, TECHID: ABNORMAL
PLATELET # BLD AUTO: 330 K/UL (ref 150–400)
PMV BLD AUTO: 11.4 FL (ref 8.9–12.9)
PROTHROMBIN TIME: 13.5 SEC (ref 11.9–14.6)
RBC # BLD AUTO: 4.74 M/UL (ref 3.8–5.2)
WBC # BLD AUTO: 13 K/UL (ref 3.6–11)

## 2023-02-07 PROCEDURE — 92610 EVALUATE SWALLOWING FUNCTION: CPT

## 2023-02-07 PROCEDURE — 74011636637 HC RX REV CODE- 636/637: Performed by: INTERNAL MEDICINE

## 2023-02-07 PROCEDURE — 70553 MRI BRAIN STEM W/O & W/DYE: CPT

## 2023-02-07 PROCEDURE — 97116 GAIT TRAINING THERAPY: CPT

## 2023-02-07 PROCEDURE — 85610 PROTHROMBIN TIME: CPT

## 2023-02-07 PROCEDURE — 97161 PT EVAL LOW COMPLEX 20 MIN: CPT

## 2023-02-07 PROCEDURE — 83036 HEMOGLOBIN GLYCOSYLATED A1C: CPT

## 2023-02-07 PROCEDURE — 82962 GLUCOSE BLOOD TEST: CPT

## 2023-02-07 PROCEDURE — G0378 HOSPITAL OBSERVATION PER HR: HCPCS

## 2023-02-07 PROCEDURE — 74011250636 HC RX REV CODE- 250/636: Performed by: STUDENT IN AN ORGANIZED HEALTH CARE EDUCATION/TRAINING PROGRAM

## 2023-02-07 PROCEDURE — 93308 TTE F-UP OR LMTD: CPT

## 2023-02-07 PROCEDURE — 74011250637 HC RX REV CODE- 250/637: Performed by: STUDENT IN AN ORGANIZED HEALTH CARE EDUCATION/TRAINING PROGRAM

## 2023-02-07 PROCEDURE — 36415 COLL VENOUS BLD VENIPUNCTURE: CPT

## 2023-02-07 PROCEDURE — 97165 OT EVAL LOW COMPLEX 30 MIN: CPT

## 2023-02-07 PROCEDURE — 74011250637 HC RX REV CODE- 250/637: Performed by: INTERNAL MEDICINE

## 2023-02-07 PROCEDURE — A9577 INJ MULTIHANCE: HCPCS | Performed by: STUDENT IN AN ORGANIZED HEALTH CARE EDUCATION/TRAINING PROGRAM

## 2023-02-07 PROCEDURE — 85027 COMPLETE CBC AUTOMATED: CPT

## 2023-02-07 PROCEDURE — 80061 LIPID PANEL: CPT

## 2023-02-07 PROCEDURE — 74011250636 HC RX REV CODE- 250/636: Performed by: INTERNAL MEDICINE

## 2023-02-07 RX ORDER — LORAZEPAM 1 MG/1
2 TABLET ORAL ONCE
Status: COMPLETED | OUTPATIENT
Start: 2023-02-07 | End: 2023-02-07

## 2023-02-07 RX ADMIN — LORAZEPAM 2 MG: 1 TABLET ORAL at 13:12

## 2023-02-07 RX ADMIN — GADOBENATE DIMEGLUMINE 16 ML: 529 INJECTION, SOLUTION INTRAVENOUS at 14:04

## 2023-02-07 RX ADMIN — BACLOFEN 10 MG: 10 TABLET ORAL at 09:00

## 2023-02-07 RX ADMIN — ASPIRIN 81 MG CHEWABLE TABLET 81 MG: 81 TABLET CHEWABLE at 08:01

## 2023-02-07 RX ADMIN — BACLOFEN 10 MG: 10 TABLET ORAL at 22:07

## 2023-02-07 RX ADMIN — HEPARIN SODIUM 5000 UNITS: 5000 INJECTION INTRAVENOUS; SUBCUTANEOUS at 16:34

## 2023-02-07 RX ADMIN — INSULIN LISPRO 2 UNITS: 100 INJECTION, SOLUTION INTRAVENOUS; SUBCUTANEOUS at 11:57

## 2023-02-07 RX ADMIN — HEPARIN SODIUM 5000 UNITS: 5000 INJECTION INTRAVENOUS; SUBCUTANEOUS at 07:51

## 2023-02-07 RX ADMIN — BACLOFEN 10 MG: 10 TABLET ORAL at 16:35

## 2023-02-07 NOTE — CONSULTS
NEUROLOGY CONSULT    Name Rodney Juarez Age 41 y.o. MRN [id number]  1975     Referring Physician: Janeen Valdez DO    Chief Complaint: Question of MS exacerbation     This is a 41 y.o. right handed  female who has been diagnosed with multiple sclerosis by [facility] neurology per patient on the basis of MRI report ports her unsteadiness and weakness. She presented to the ED after an episode yesterday morning. Per patient she just completed the laundry and suddenly felt lightheaded and dizzy followed by numbness of the right side of the lips then face and whole right side of the head and neck felt numb and tight and then she felt the numbness in the right arm and the leg. Per patient she just completed a course of steroids for MS flare she denies any fevers chills nausea vomiting change in the speech or vision. In the ED she had a CT scan of the head showing a hypodense right parietal lesion and a CTA of the head and neck was unremarkable for any stenosis an MRI of the brain was also done without contrast which did not reveal any acute infarct but it did show the lesion posterior to the posterior horn of the right lateral ventricle. Neurology was consulted. At the time of my evaluation this morning the patient is still has the symptoms which is slightly better but not gone and her examination reveals small giveaway type of weakness in the right upper and the lower extremities. Assessment and Plan:  1. Right face neck on leg numbness and subjective weakness of the arm and the leg. CT scan showed hyper potential lesion in the right parietal white matter. MRI did not reveal any acute findings to indicate stroke or recent lesion but did show the hypodense lesion in the right parietal white matter posterior to the posterior horn of the right lateral ventricle which does not explain her symptoms on the right side.   There are some white matter changes in the left side and the right side but there are old. Although the contrast was not given but the diffusion images will pick any acute lesions. I will order additional films with the contrast of the MRI to complete the work-up. The patient never had the LP because they attempted once in failed because proper specimens were not done but there was supposed to. I would not give her high-dose IV steroids till we complete the MRI with contrast and if it shows enhancing lesions then I will give her high-dose steroids. 2.  Anxiety. 3.  Fibromyalgia. 4.  Diabetes mellitus  5. Migraine headaches    Thank you for the consult    Allergies   Allergen Reactions    Sulfa (Sulfonamide Antibiotics) Unable to Obtain     Migraine Headache    Vancomycin Other (comments)     Red streaks at injection site.      Zoloft [Sertraline] Unable to Obtain     Anxious and passed out     Current Facility-Administered Medications   Medication Dose Route Frequency    hydrALAZINE (APRESOLINE) 20 mg/mL injection 10 mg  10 mg IntraVENous Q6H PRN    ammonium lactate (AMLACTIN) 12 % cream   Topical BID    baclofen (LIORESAL) tablet 10 mg  10 mg Oral TID    clotrimazole-betamethasone (LOTRISONE) 1-0.05 % cream   Topical BID    glucose chewable tablet 16 g  4 Tablet Oral PRN    glucagon (GLUCAGEN) injection 1 mg  1 mg IntraMUSCular PRN    dextrose 10% infusion 0-250 mL  0-250 mL IntraVENous PRN    insulin lispro (HUMALOG) injection   SubCUTAneous AC&HS    SUMAtriptan (IMITREX) tablet 50 mg  50 mg Oral ONCE PRN    acetaminophen (TYLENOL) tablet 650 mg  650 mg Oral Q4H PRN    Or    acetaminophen (TYLENOL) solution 650 mg  650 mg Per NG tube Q4H PRN    Or    acetaminophen (TYLENOL) suppository 650 mg  650 mg Rectal Q4H PRN    aspirin chewable tablet 81 mg  81 mg Oral DAILY    atorvastatin (LIPITOR) tablet 40 mg  40 mg Oral QHS    heparin (porcine) injection 5,000 Units  5,000 Units SubCUTAneous Q8H     Past Medical History:   Diagnosis Date    Anxiety     Diabetes ([address]     gestational Fibromyalgia     Currently seeing a Neurologist to confirm diagnosis, MS, right arm pain    Ill-defined condition     Patient reports increased WBC's- has appointment with Oncologist    Malvin     MS (multiple sclerosis) ([facility] [address]      Social History     Tobacco Use    Smoking status: Every Day     Packs/day: 0.50     Years: 15.00     Pack years: 7.50     Types: Cigarettes    Smokeless tobacco: Never   Vaping Use    Vaping Use: Some days    Substances: Nicotine   Substance Use Topics    Alcohol use: Yes     Comment: Rarely    Drug use: Never     Exam  Visit Vitals  BP 99/62 (BP 1 Location: Right upper arm, BP Patient Position: At rest)   Pulse 67   Temp 97.5 °F (36.4 °C)   Resp 18   SpO2 100%   Breastfeeding No     General: Well developed, well nourished. Patient in no distress   Head: Normocephalic, atraumatic, anicteric sclera   Neck Normal ROM, No thyromegally   Lungs:  Clear to auscultation    Cardiac: Regular rate and rhythm with no murmurs. Abd: Bowel sounds were audible   Ext: No pedal edema   Skin: Supple no rash     NeurologicExam:  Mental Status: Alert and oriented to person place and time   Speech: Fluent no aphasia or dysarthria. Cranial Nerves:   Pupils are equal round and reactive to light and accommodation, extraocular movements are intact and full, visual fields are intact by confrontation, no nystagmus noted, face is symmetric, sensation in face is intact and symmetric, hearing is intact and symmetric, tongue and uvula are in midline with normal movements, palate is elevating equally, shoulder shrug is intact and symmetric. Motor:  Full and symmetric strength of left upper and lower ext but giveaway type of weakness of the right upper and lower extremities which improves with reinforcement and suggestions. no pronator drift noted. Normal bulk and tone. Reflexes:   Deep tendon reflexes 2+/4 and symmetric.    Sensory:   Symmetric and intact    Gait:  Gait is deferred   Tremor:   No tremor noted.   Cerebellar:  Coordination intact for finger-nose-finger. Neurovascular: No carotid bruits.  No JVD      Lab Review  Lab Results   Component Value Date/Time    WBC 13.0 (H) 02/07/2023 07:28 AM    HCT 41.4 02/07/2023 07:28 AM    HGB 14.0 02/07/2023 07:28 AM    PLATELET 413 59/34/1151 07:28 AM     Lab Results   Component Value Date/Time    Sodium 140 02/06/2023 01:55 PM    Potassium 4.4 02/06/2023 01:55 PM    Chloride 107 02/06/2023 01:55 PM    CO2 28 02/06/2023 01:55 PM    Glucose 110 (H) 02/06/2023 01:55 PM    BUN 6 02/06/2023 01:55 PM    Creatinine 0.72 02/06/2023 01:55 PM    Calcium 9.6 02/06/2023 01:55 PM     Lab Results   Component Value Date/Time    Vitamin B12 299 04/22/2022 11:33 AM    Folate 4.3 04/22/2022 11:33 AM     No results found for: LDL, LDLC, DLDLP  No results found for: HBA1C, JVS5EWBB, ROT9HEML  No components found for: TROPQUANT  No results found for: CHEVY

## 2023-02-07 NOTE — PROGRESS NOTES
Problem: Mobility Impaired (Adult and Pediatric)  Goal: *Acute Goals and Plan of Care (Insert Text)  Description: I with LE HEP x 7days  Pt will be able to verbally state energy conservation techniques to use at home during MS flare ups  Mod I with bed mob x7 days  Mod I with all transfers x7 days  Amb 75-100ft with LRAD and SBAx1 x7 days    Pt stated goal: to go home with HHPT  Outcome: Not Met    PHYSICAL THERAPY EVALUATION  Patient: Elizabeth Alfonso (88 y.o. female)  Date: 2/7/2023  Primary Diagnosis: TIA (transient ischemic attack) [G45.9]  Multiple sclerosis ([facility] [address] [G35]       Precautions: In place during session:     ASSESSMENT    Pt is a 41 y.o. female admitted to hospital with ? TIA/exacerbation of MS? Presents to PT with decreased bed mob, transfers, LE strength, gt, balance, and overall functional mobility. Pt supine in bed upon PT arrival, agreeable to evaluation. Pt A&O x 4. PLOF listed below. Of note, pt was recently dx with MS at end of 2022 as per MD note from Dr. Sita Mcgrath in chart. CT and MRI neg for acute infarct, both CT and MRI suggest demyelinating disease. Based on the objective data described below, the patient presents with generalized weakness, impaired functional mobility, impaired amb, impaired balance, and decreased overall functional mobility. (See below for objective details and assist levels). Overall pt tolerated session good today with overall SBA with bed mob and CGA with transfers. Pt was able to amb approx 30ft in room with CGAx1. Pt demos slow wei, step to gt pattern at times, narrow HARRY and appeared to req inc concentration to ambulate without LOB. PT did suggest to use cane next visit to help with gt/balance, pt was agreeable to try next visit. Pt will benefit from continued skilled PT to address above deficits and return to PLOF. Current PT DC recommendation Home with Home Health Therapy once medically appropriate.        PLAN :  Recommendations and Planned Interventions: bed mobility training, transfer training, gait training, therapeutic exercises, patient and family training/education, and therapeutic activities      Recommend for staff: Out of bed to chair for meals, Encourage HEP in prep for ADLs/mobility, Amb to bathroom for toileting with gt belt and AD, and Amb in hallway    Frequency/Duration: Patient will be followed by physical therapy:  3-5x/week to address goals.     Recommendation for discharge: (in order for the patient to meet his/her long term goals)  Home with [address] [place] [address]    IF patient discharges home will need the following DME: straight cane         SUBJECTIVE:   Patient supine in bed upon PT arrival, agreeable to work with PT    OBJECTIVE DATA SUMMARY:   HISTORY:    Past Medical History:   Diagnosis Date    Anxiety     Diabetes ([facility] [address]     gestational    Fibromyalgia     Currently seeing a Neurologist to confirm diagnosis, MS, right arm pain    Ill-defined condition     Patient reports increased WBC's- has appointment with Oncologist    Migraines     MS (multiple sclerosis) ([facility] [address]      Past Surgical History:   Procedure Laterality Date    HX CHOLECYSTECTOMY      HX GYN      hysterectomy    HX OOPHORECTOMY Left     january 2022    HX ORTHOPAEDIC      toe, bilateral big toes    HX TUBAL LIGATION      HX WISDOM TEETH EXTRACTION Bilateral     Bottoms only       Home Situation  Home Environment: Private residence  # Steps to Enter: 6  Rails to Enter: Yes  Hand Rails : Bilateral  One/Two Story Residence: Two story  Living Alone: No  Support Systems: Spouse/Significant Other  Patient Expects to be Discharged to[de-identified] Home with home health  Current DME Used/Available at Home: None    Personal factors and/or comorbidities impacting plan of care:     Home Situation  Home Environment: Private residence  # Steps to Enter: 6  Rails to Enter: Yes  Hand Rails : Bilateral  One/Two Story Residence: Two story  Living Alone: No  Support Systems: Spouse/Significant Other  Patient Expects to be Discharged to[de-identified] Home with home health  Current DME Used/Available at Home: None    EXAMINATION/PRESENTATION/DECISION MAKING:   Critical Behavior:  Neurologic State: Alert  Orientation Level: Oriented X4  Cognition: Appropriate for age attention/concentration  Safety/Judgement: Awareness of environment    Skin:  intact where exposed    Edema: none noted    Range Of Motion:  AROM: Generally decreased, functional   R LE decreased more than L LE         Strength:    Strength: Generally decreased, functional  Grossly 3+/5 R LE  Grossly 4/5 L LE       Tone & Sensation:     Sensation: Impaired to LT R LE (reports numbness)               Coordination:  L UE finger to nose > R UE finger to nose    L LE heel to shin > R LE heel to shin (pt able to complete task but req inc time)       Functional Mobility:  Bed Mobility:     Supine to Sit: Stand-by assistance  Sit to Supine: Stand-by assistance  Scooting: Stand-by assistance  Transfers:  Sit to Stand: Contact guard assistance  Stand to Sit: Contact guard assistance  Stand Pivot Transfers: Contact guard assistance                    Balance:   Sitting: Intact  Standing: Impaired  Standing - Static: Fair  Standing - Dynamic : Fair  Ambulation/Gait Training:  Distance (ft): 30 Feet (ft)  Assistive Device: Gait belt  Ambulation - Level of Assistance: Contact guard assistance            Functional Measure:  [address] \[facility] [place] [facility]\"" [address] [address] AM-PAC 6 Clicks         Basic Mobility Inpatient Short Form  How much difficulty does the patient currently have. .. Unable A Lot A Little None   1. Turning over in bed (including adjusting bedclothes, sheets and blankets)? [] 1   [] 2   [] 3   [x] 4   2. Sitting down on and standing up from a chair with arms ( e.g., wheelchair, bedside commode, etc.)   [] 1   [] 2   [x] 3   [] 4   3. Moving from lying on back to sitting on the side of the bed?    [] 1   [] 2   [] 3   [x] 4          How much help from another person does the patient currently need. .. Total A Lot A Little None   4. Moving to and from a bed to a chair (including a wheelchair)? [] 1   [] 2   [x] 3   [] 4   5. Need to walk in hospital room? [] 1   [] 2   [x] 3   [] 4   6. Climbing 3-5 steps with a railing? [] 1   [] 2   [x] 3   [] 4   © 2007, Trustees of [facility], under license to [employer]. All rights reserved     Score:  Initial: 20 Most Recent: X (Date: -- )   Interpretation of Tool:  Represents activities that are increasingly more difficult (i.e. Bed mobility, Transfers, Gait). Score 24 23 22-20 19-15 14-10 9-7 6   Modifier CH CI CJ CK CL CM CN           Physical Therapy Evaluation Charge Determination   History Examination Presentation Decision-Making   MEDIUM  Complexity : 1-2 comorbidities / personal factors will impact the outcome/ POC  MEDIUM Complexity : 3 Standardized tests and measures addressing body structure, function, activity limitation and / or participation in recreation  LOW Complexity : Stable, uncomplicated  Other Functional Measure [facility] 6 LOW      Based on the above components, the patient evaluation is determined to be of the following complexity level: LOW     Pain Rating:  Pt did c/o low back pain during ambulation    Activity Tolerance:   Good    After treatment patient left in no apparent distress:   Bed locked and in lowest position Call bell within reach, Side rails x 3, and sitting up in bed            COMMUNICATION/EDUCATION:   The patients plan of care was discussed with: Case management. Fall prevention education was provided and the patient/caregiver indicated understanding. and Patient/family agree to work toward stated goals and plan of care.       Thank you for this referral.  Chau Muñoz   Time Calculation: 18 mins

## 2023-02-07 NOTE — PROGRESS NOTES
OT eval order received and acknowledged. OT eval attempted at 8:32 AM, however, pt at CVL. OT will continue to follow and attempt eval at a later time. Thank you.

## 2023-02-07 NOTE — PROGRESS NOTES
OCCUPATIONAL THERAPY EVALUATION  Patient: Ruy Booth (47 y.o. female)  Date: 2/7/2023  Primary Diagnosis: TIA (transient ischemic attack) [G45.9]  Multiple sclerosis ([facility] [address] [G35]       Precautions: fall risk     In place during session: Peripheral IV    ASSESSMENT  Pt is a 41 y.o. female presenting to [facility] with c/o dizziness and extremity weakness, admitted 2/6/23 and currently being treated for TIA/CVA work up vs MS flare, hyperglycemia, DM II, hyperlipidemia. See below for home and PLOF information. MRI brain completed 2/7/23 shows no abnormal enhancement. Pt received long sitting on bed upon arrival, AXO x4, and agreeable to OT evaluation. Based on current observations, pt presents with deficits in generalized strength/AROM, static/dynamic standing balance (see PT note for gait details), functional activity tolerance, RUE sensation, coordination currently impacting overall performance of ADLs and functional transfers/mobility (see below for objective details and assist levels). Overall, pt tolerates session fair with pt completing bed mobility, sit<>stand transfers, and ambulating to and from bathroom and demo'ing toilet transfer with SBA-CGA overall. Pt with one LOB, self corrected, upon first standing. Pt demo'ing decreased GM and FM coordination of RUE. Pt reports numbness/tingling of RUE from fingertips to shoulder. Pt able to don/doff socks while seated EOB/long sitting on bed with mod I. Pt educated on use of RW vs SPC if feeling unsteady with pt verbalizing understanding. Pt would benefit from continued skilled OT services to address current impairments and improve IND and safety with self cares and functional transfers/mobility. Current OT d/c recommendation Home with Home Health Therapy once medically appropriate.     Other factors to consider for discharge: family/social support, DME, time since onset, severity of deficits, functional baseline     Patient will benefit from skilled therapy intervention to address the above noted impairments. PLAN :  Recommendations and Planned Interventions: self care training, functional mobility training, therapeutic exercise, balance training, therapeutic activities, endurance activities, neuromuscular re-education, patient education, home safety training, and family training/education    Recommend with staff: Encourage HEP in prep for ADLs/mobility and Amb to bathroom for toileting with gt belt and AD    Recommend next session: Toileting and Standing grooming    Frequency/Duration: Patient will be followed by occupational therapy:  3-5x/week to address goals. Recommendation for discharge: (in order for the patient to meet his/her long term goals)  Home with [address] [place] [address]    This discharge recommendation:  Has been made in collaboration with the attending provider and/or case management    IF patient discharges home will need the following DME: shower chair       SUBJECTIVE:   Patient stated I have tried to tell them my symptoms.     OBJECTIVE DATA SUMMARY:   HISTORY:   Past Medical History:   Diagnosis Date    Anxiety     Diabetes ([facility] [address]     gestational    Fibromyalgia     Currently seeing a Neurologist to confirm diagnosis, MS, right arm pain    Ill-defined condition     Patient reports increased WBC's- has appointment with Oncologist    Migraines     MS (multiple sclerosis) ([facility] [address]      Past Surgical History:   Procedure Laterality Date    HX CHOLECYSTECTOMY      HX GYN      hysterectomy    HX OOPHORECTOMY Left     january 2022    HX ORTHOPAEDIC      toe, bilateral big toes    HX TUBAL LIGATION      HX WISDOM TEETH EXTRACTION Bilateral     Bottoms only       Per pt:   Home Situation  Home Environment: Private residence  # Steps to Enter: 6  Rails to Enter: Yes  Hand Rails : Bilateral  One/Two Story Residence: Two story  Living Alone: No  Support Systems: Spouse/Significant Other  Patient Expects to be Discharged to[de-identified] Home  Current DME Used/Available at Home: None      Hand dominance: Right    EXAMINATION OF PERFORMANCE DEFICITS:  Cognitive/Behavioral Status:  Neurologic State: Alert  Orientation Level: Oriented X4  Cognition: Appropriate decision making; Appropriate for age attention/concentration; Appropriate safety awareness; Follows commands  Perception: Appears intact  Perseveration: No perseveration noted  Safety/Judgement: Awareness of environment; Insight into deficits    Hearing: Auditory  Auditory Impairment: None    Vision/Perceptual:    Tracking: Able to track stimulus in all quadrants w/o difficulty    Saccades: Within Defined Limits    Visual Fields:  (Pt reports \"Kaleidascope\" type blind spot in visual field)  Diplopia: No    Acuity: Within Defined Limits         Range of Motion:  AROM: Within functional limits    Strength:  Strength: Generally decreased, functional (RUE decreased compared to LUE)      Coordination:  Fine Motor Skills-Upper: Left Intact; Right Impaired    Gross Motor Skills-Upper: Left Intact; Right Impaired    Tone & Sensation:  Sensation: Impaired (RUE)    Balance:  Sitting: Intact; Without support  Standing: Impaired  Standing - Static: Fair  Standing - Dynamic : Poor    Functional Mobility and Transfers for ADLs:  Bed Mobility:  Supine to Sit: Stand-by assistance  Sit to Supine: Stand-by assistance  Scooting: Stand-by assistance    Transfers:  Sit to Stand: Contact guard assistance  Stand to Sit: Minimum assistance  Bathroom Mobility: Minimum assistance  Toilet Transfer : Contact guard assistance      ADL Intervention and task modifications:  Lower Body Dressing Assistance  Socks: Modified independent  Position Performed: Seated edge of bed         Cognitive Retraining  Safety/Judgement: Awareness of environment; Insight into deficits    Therapeutic Exercise:  Pt would benefit from UE HEP in prep for ADLs and functional mobility/transfers.     Functional Measure:    Mack Showers AM-PACTM \"6 Clicks\" Daily Activity Inpatient Short Form  How much help from another person does the patient currently need. .. Total; A Lot A Little None   1. Putting on and taking off regular lower body clothing? []  1 []  2 [x]  3 []  4   2. Bathing (including washing, rinsing, drying)? []  1 []  2 [x]  3 []  4   3. Toileting, which includes using toilet, bedpan or urinal? [] 1 []  2 [x]  3 []  4   4. Putting on and taking off regular upper body clothing? []  1 []  2 []  3 [x]  4   5. Taking care of personal grooming such as brushing teeth? []  1 []  2 [x]  3 []  4   6. Eating meals? []  1 []  2 []  3 [x]  4   © , Trustees of [facility], under license to [employer]. All rights reserved     Score: 20/24     Interpretation of Tool:  Represents clinically-significant functional categories (i.e. Activities of daily living). Percentage of Impairment CH    0%   CI    1-19% CJ    20-39% CK    40-59% CL    60-79% CM    80-99% CN     100%   [facility]  Score 6-24 24 23 20-22 15-19 10-14 7-9 6     Occupational Therapy Evaluation Charge Determination   History Examination Decision-Making   LOW Complexity : Brief history review  MEDIUM Complexity : 3-5 performance deficits relating to physical, cognitive , or psychosocial skils that result in activity limitations and / or participation restrictions MEDIUM Complexity : Patient may present with comorbidities that affect occupational performnce.  Miniml to moderate modification of tasks or assistance (eg, physical or verbal ) with assesment(s) is necessary to enable patient to complete evaluation       Based on the above components, the patient evaluation is determined to be of the following complexity level: LOW     Pain Ratin/10    Activity Tolerance:   Fair and requires rest breaks    After treatment patient left in no apparent distress:    Supine in bed, Call bell within reach, and Side rails x 3, bed locked and in lowest position    COMMUNICATION/EDUCATION:   The patients plan of care was discussed with: Registered nurse. Patient/family agree to work toward stated goals and plan of care. This patients plan of care is appropriate for delegation to [facility] [place]. Thank you for this referral.  Jenna Kolb OT  Time Calculation: 17 mins    Problem: Self Care Deficits Care Plan (Adult)  Goal: *Acute Goals and Plan of Care (Insert Text)  Description:   FUNCTIONAL STATUS PRIOR TO ADMISSION: Patient was independent and active without use of DME. Patient was modified independent for basic and instrumental ADLs. HOME SUPPORT: The patient lived with her SO but did not require assist.    Occupational Therapy Goals  Initiated 2/7/2023  Patient Goal: Walk, get stronger, and return to work. 1.  Patient will perform grooming with independence within 7 day(s). 2.  Patient will perform bathing with independence within 7 day(s). 3.  Patient will perform simple home management with independence within 7 day(s). 4.  Patient will perform toilet transfers with independence within 7 day(s). 5.  Patient will perform all aspects of toileting with independence within 7 day(s). 6.  Patient will participate in upper extremity therapeutic exercise/activities with independence within 7 day(s).     Outcome: Not Met

## 2023-02-07 NOTE — PROGRESS NOTES
SPEECH LANGUAGE PATHOLOGY BEDSIDE SWALLOW EVALUATIONS  Patient: Fabiola Koyanagi  (32 y.o. )  Date: 2/7/2023  Primary Diagnosis: TIA   Precautions:  Fall    ASSESSMENT :  Patient is A&Ox4 and follows basic commands. No facial droop or dysarthria. Informally, speech language is wfl. Patient presents w/ wfl oropharyngeal swallow fxn. No overt s/s of pen/asp observed. PLAN :  Recommendations and Planned Interventions:  Rec regular/thin diet w/ general asp/GERD precautions. No further ST intervention at this time. Please re-consult as medically indicated. SUBJECTIVE:   Patient denies dysphagia and speech language deficits. Report improved R paraesthesia. R sided weakness at baseline but feels has worsened. OBJECTIVE:   Patient admitted w/ R sided paraesthesia, dizziness, and increased R sided weakness. Past Medical History:   Diagnosis Date    Anxiety     Diabetes ([facility] [address]     gestational    Fibromyalgia     Currently seeing a Neurologist to confirm diagnosis, MS, right arm pain    Ill-defined condition     Patient reports increased WBC's- has appointment with Oncologist    Migraines     MS (multiple sclerosis) ([facility] [address]          Diet prior to admission: Regular  Current Diet:  DIET ADULT     Cognitive and Communication Status:  Neurologic State: Alert  Orientation Level: Oriented X4  Cognition: Appropriate decision making, Follows commands  Perception: Appears intact  Perseveration: No perseveration noted  Safety/Judgement: Awareness of environment  Swallowing Evaluation:   Oral Assessment:  Oral Assessment  Labial: No impairment  Dentition: Intact  Oral Hygiene: wfl  Lingual: No impairment  Velum: No impairment  Mandible: No impairment  P.O. Trials:  Patient Position: upright in bed  Vocal quality prior to P.O.: No impairment  Consistency Presented: Thin liquid; Solid;Puree  How Presented: Self-fed/presented;Straw;Successive swallows     Bolus Acceptance: No impairment  Bolus Formation/Control: No impairment     Propulsion: No impairment  Oral Residue: None  Initiation of Swallow: No impairment  Laryngeal Elevation: Functional  Aspiration Signs/Symptoms: None  Pharyngeal Phase Characteristics: No impairment, issues, or problems              Oral Phase Severity: No impairment  Pharyngeal Phase Severity : No impairment  Voice:  Vocal Quality: No impairment    Pain:   0      After treatment:   Patient left in no apparent distress in bed, Call bell within reach, and Nursing notified    COMMUNICATION/EDUCATION:   Patient was educated regarding diet recs, s/s aspiration, aspiration precautions, and BEFAST. She demonstrated Good understanding as evidenced by engagement and appropriate questions. The patient's plan of care including recommendations, planned interventions, and recommended diet changes were discussed with: Registered nurse.      Thank you for this referral.  Urban Dyer M.S., M.Ed., CCC-SLP  Time Calculation: 16 mins

## 2023-02-07 NOTE — PROGRESS NOTES
Hospitalist Progress Note    NAME: Perry Wang   :  1975   MRN:  [id number]       Assessment / Plan:    Tingling numbness weakness:  -MRI brain without contrast did not show evidence of CVA. -MRI brain with contrast did not show evidence of MS exacerbation.  -Discontinue aspirin, Lipitor, echo.  -Neurology recommendations noted. Diabetes mellitus type 2:  -Patient takes metformin at home.  -Sliding scale in the hospital.    Anxiety:  -Patient takes Xanax at home. Multiple sclerosis:  -Patient takes Kesimpta    Migraine headaches:  -Sumatriptan    Fibromyalgia:  -Baclofen  -Bupropion    Estimated discharge date: 23  Barriers: Neurology clearance    Code status: Full  Prophylaxis: Not needed  Recommended Disposition: Home w/Family     Subjective:     Patient comfortably lying in the bed. Patient underwent MRI brain with and without contrast.  Results reviewed. Plan of care discussed with patient and father. Neurology on board. Await recommendations. Possible discharge in next 24 hours. Objective:     VITALS:   Last 24hrs VS reviewed since prior progress note.  Most recent are:  Patient Vitals for the past 24 hrs:   Temp Pulse Resp BP SpO2   23 1600 -- 74 -- -- --   23 1525 98.4 °F (36.9 °C) 60 18 106/65 97 %   23 1127 98.1 °F (36.7 °C) 65 18 126/79 99 %   23 1112 -- 67 -- -- --   23 0800 -- 67 -- -- --   23 0716 97.5 °F (36.4 °C) 67 18 99/62 100 %   23 0400 -- 83 -- -- --   23 0315 97.6 °F (36.4 °C) 83 18 101/66 96 %   23 0000 -- 83 -- -- --   23 2305 98 °F (36.7 °C) 75 18 124/76 98 %   23 -- 81 -- -- --   23 1905 98.2 °F (36.8 °C) 68 18 120/78 96 %   23 1704 -- 70 15 139/89 --       Intake/Output Summary (Last 24 hours) at 2023 1659  Last data filed at 2023 0754  Gross per 24 hour   Intake 300 ml   Output --   Net 300 ml        I had a face to face encounter and independently examined this patient on 2/7/2023, as outlined below:  PHYSICAL EXAM:  General: Alert, cooperative, no acute distress    EENT:  EOMI. Anicteric sclerae. Resp:  CTA bilaterally, no wheezing or rales. No accessory muscle use  CV:  Regular  rhythm,  No edema  GI:  Soft, Non distended, Non tender. Neurologic:  Alert and oriented X 3, normal speech,   Psych:   Good insight. Not anxious nor agitated  Skin:  No rashes. No jaundice    Reviewed most current lab test results and cultures  YES  Reviewed most current radiology test results   YES  Review and summation of old records today    NO  Reviewed patient's current orders and MAR    YES  PMH/SH reviewed - no change compared to H&P  ________________________________________________________________________  Care Plan discussed with:    Comments   Patient [facility]     Consultant                       Y Multidiciplinary team rounds were held today with , nursing, pharmacist and clinical coordinator. Patient's plan of care was discussed; medications were reviewed and discharge planning was addressed. I spent a total of 50 minutes on the day of the visit. This includes face to face time and non-face to face time preparing to see the patient such as review of tests, obtaining and/or reviewing separately obtained history, documenting clinical information in the electronic or other health record, independently interpreting results and communication results to the patient/family/caregiver, or care coordinator. Tee Lockhart MD     Procedures: see electronic medical records for all procedures/Xrays and details which were not copied into this note but were reviewed prior to creation of Plan. LABS:  I reviewed today's most current labs and imaging studies.   Pertinent labs include:  Recent Labs     02/07/23 0728 02/06/23  1355   WBC 13.0* 14.2*   HGB 14.0 15.6   HCT 41.4 46.0    375     Recent Labs     02/07/23 0728 02/06/23  1355   NA  --  140   K  --  4.4   CL  --  107   CO2  --  28   GLU  --  110*   BUN  --  6   CREA  --  0.72   CA  --  9.6   ALB  --  4.1   TBILI  --  0.3   ALT  --  18   INR 1.0 1.0       Signed: Thom Sanchez MD

## 2023-02-07 NOTE — PROGRESS NOTES
Problem: Falls - Risk of  Goal: *Absence of Falls  Description: Document Luis F Comment Fall Risk and appropriate interventions in the flowsheet.   Outcome: Progressing Towards Goal  Note: Fall Risk Interventions:                                Problem: Patient Education: Go to Patient Education Activity  Goal: Patient/Family Education  Outcome: Progressing Towards Goal     Problem: TIA/CVA Stroke: 0-24 hours  Goal: Off Pathway (Use only if patient is Off Pathway)  Outcome: Progressing Towards Goal  Goal: Activity/Safety  Outcome: Progressing Towards Goal  Goal: Consults, if ordered  Outcome: Progressing Towards Goal  Goal: Diagnostic Test/Procedures  Outcome: Progressing Towards Goal  Goal: Discharge Planning  Outcome: Progressing Towards Goal  Goal: Medications  Outcome: Progressing Towards Goal  Goal: *Hemodynamically stable  Outcome: Progressing Towards Goal  Goal: *Neurologically stable  Description: Absence of additional neurological deficits    Outcome: Progressing Towards Goal  Goal: *Verbalizes anxiety and depression are reduced or absent  Outcome: Progressing Towards Goal  Goal: *Absence of Signs of Aspiration on Current Diet  Outcome: Progressing Towards Goal  Goal: *Absence of deep venous thrombosis signs and symptoms(Stroke Metric)  Outcome: Progressing Towards Goal  Goal: *Ability to perform ADLs and demonstrates progressive mobility and function  Outcome: Progressing Towards Goal  Goal: *Stroke education started(Stroke Metric)  Outcome: Progressing Towards Goal  Goal: *Dysphagia screen performed(Stroke Metric)  Outcome: Progressing Towards Goal  Goal: *Rehab consulted(Stroke Metric)  Outcome: Progressing Towards Goal

## 2023-02-07 NOTE — TELEPHONE ENCOUNTER
RE:Kesimpta  Key: [id number], [id number]      Rcvd notification via CMM:  Your PA request has been denied.   1) you have tried and failed one of the following first:  -dimethyl fumarate  -Copaxone 20 milligrams  -Betaseron  -Avonex      Nurse notified

## 2023-02-07 NOTE — PROGRESS NOTES
Therapy recs are for [facility] and a straight cane. Per chart, patient has no PCP. CM attempted to meet with patient to discuss the above, but patient off unit at this time. CM will follow-up.    ----------    1122- CM met with patient to discuss the above. Patients PCP is Dr. Jayleen Ferris and she last saw him last month. Patient agrees with  and a straight cane. Choice letter signed, no preference of agency or DME supplier. CM sent referrals.

## 2023-02-08 VITALS
TEMPERATURE: 97.7 F | OXYGEN SATURATION: 97 % | DIASTOLIC BLOOD PRESSURE: 71 MMHG | SYSTOLIC BLOOD PRESSURE: 105 MMHG | HEART RATE: 63 BPM | RESPIRATION RATE: 12 BRPM

## 2023-02-08 LAB
GLUCOSE BLD STRIP.AUTO-MCNC: 136 MG/DL (ref 65–100)
PERFORMED BY, TECHID: ABNORMAL

## 2023-02-08 PROCEDURE — 74011250637 HC RX REV CODE- 250/637: Performed by: INTERNAL MEDICINE

## 2023-02-08 PROCEDURE — 82962 GLUCOSE BLOOD TEST: CPT

## 2023-02-08 PROCEDURE — G0378 HOSPITAL OBSERVATION PER HR: HCPCS

## 2023-02-08 RX ADMIN — BACLOFEN 10 MG: 10 TABLET ORAL at 08:45

## 2023-02-08 RX ADMIN — BACLOFEN 10 MG: 10 TABLET ORAL at 08:40

## 2023-02-08 NOTE — PROGRESS NOTES
CM noted discharge order. Patient is set up with [facility]. CM has sent orders and DC summary. Patient has straight cane waiting for pickup at [facility].  Patient aware and will  once she leaves hospital.    Primary RN made aware. Discharge plan of care/case management plan validated with provider discharge order.

## 2023-02-08 NOTE — DISCHARGE INSTRUCTIONS
Hospitalist Discharge Instructions      DISCHARGE DIAGNOSIS:     Tingling numbness weakness:  -MRI brain without contrast did not show evidence of CVA. -MRI brain with contrast did not show evidence of MS exacerbation.  -Discontinue aspirin, Lipitor, echo.  -Neurology recommendations noted. Diabetes mellitus type 2:  -Patient takes metformin at home. Anxiety:  -Patient takes Xanax at home as needed     Multiple sclerosis:  -Patient takes Kesimpta     Migraine headaches:  -Sumatriptan     Fibromyalgia:  -Baclofen  -Bupropion     CONSULTATIONS:  IP CONSULT TO NEUROLOGY   ______________________________________________________________________  DISCHARGE SUMMARY/HOSPITAL COURSE:  for full details see H&P, daily progress notes, labs, consult notes. Patient presented to the hospital complaining of tingling, numbness, dizziness. Work-up ruled out stroke. MS exacerbation was ruled out. Neurology cleared the patient for discharge. Patient being discharged in stable condition. _______________________________________________________________________  Patient seen and examined by me on discharge day. Pertinent Findings:  Gen:    Not in distress  Chest:  Clear lungs  CVS:    Regular rhythm. No edema  Abd:     Soft, not distended, not tender  Neuro:  Alert,   _______________________________________________________________________  DISCHARGE MEDICATIONS:   Current Discharge Medication List               CONTINUE these medications which have NOT CHANGED     Details   baclofen (LIORESAL) 10 mg tablet Take 1 Tablet by mouth three (3) times daily. Qty: 90 Tablet, Refills: 3       metFORMIN (GLUCOPHAGE) 500 mg tablet Take 500 mg by mouth two (2) times daily (with meals). ondansetron (ZOFRAN ODT) 4 mg disintegrating tablet Take 1 Tablet by mouth every eight (8) hours as needed for Nausea or Vomiting.   Qty: 20 Tablet, Refills: 0       Kesimpta Pen 20 mg/0.4 mL pnij 20 mg by SubCUTAneous route See Admin Instructions. Indications: relapsing form of multiple sclerosis  Qty: 6 Each, Refills: 1     Comments: Advise on administration instructions to initial dosing pattern 1X/week for 3 weeks, then skip a week, and start a 1X/month SQ injection thereafter. ..       diphenhydrAMINE (BenadryL) 25 mg capsule Take 1 Capsule by mouth every six (6) hours as needed for Itching. Qty: 20 Capsule, Refills: 0       buPROPion SR (WELLBUTRIN SR) 150 mg SR tablet TAKE ONE TABLET BY MOUTH DAILY FOR 3 DAYS, THEN INCREASE TO 1 TABLET TWICE DAILY       clotrimazole-betamethasone (LOTRISONE) topical cream Apply  to affected area two (2) times a day. Qty: 45 g, Refills: 0       ammonium lactate (AMLACTIN) 12 % topical cream Apply  to affected area two (2) times a day. rub in to affected area well  Qty: 280 g, Refills: 2       SUMAtriptan (IMITREX) 50 mg tablet Take 50 mg by mouth once as needed for Migraine. ALPRAZolam (XANAX) 1 mg tablet Take 0.5 mg by mouth daily as needed. STOP taking these medications         predniSONE (DELTASONE) 10 mg tablet Comments:   Reason for Stopping:                       Patient Follow Up Instructions: Activity: Activity as tolerated  Diet: Regular Diet  Wound Care: None needed     Follow-up with PCP, Neurology in 3 weeks.   Follow-up tests/labs None     Follow-up Information         Follow up With Specialties Details Why [facility], [address] [place] [address], [address] [place] [address]  Maciej Schafer MD Specialist Undefined, Neurology Follow up in 2 week(s)   Eren [address]iz[address]  [facility]  [phone]

## 2023-02-08 NOTE — DISCHARGE SUMMARY
Hospitalist Discharge Summary     Patient ID:  Naty Gordillo  [id number]  59 y.o.  1975  2/6/2023    PCP on record: Bib Allen DO    Admit date: 2/6/2023  Discharge date and time: 2/8/2023    DISCHARGE DIAGNOSIS:    Tingling numbness weakness:  -MRI brain without contrast did not show evidence of CVA. -MRI brain with contrast did not show evidence of MS exacerbation.  -Discontinue aspirin, Lipitor, echo.  -Neurology recommendations noted. Diabetes mellitus type 2:  -Patient takes metformin at home. Anxiety:  -Patient takes Xanax at home as needed     Multiple sclerosis:  -Patient takes Kesimpta     Migraine headaches:  -Sumatriptan     Fibromyalgia:  -Baclofen  -Bupropion    CONSULTATIONS:  IP CONSULT TO NEUROLOGY    Excerpted HPI from H&P of Kota Garcia MD:  79-year-old female with past medical history of multiple comorbidities presents to [facility] with complaints of dizziness/numbness/tingling. Of note patient has a history of multiple sclerosis, just completed her steroid course after an MS flare, patient states once earlier this a.m. she had sudden onset intermittent dizziness as well as numbness/tingling of her right lower face as well as numbness/tingling of her right upper extremity following which patient presented to the ED. Of note the symptoms continue to persist, patient states these are not similar to her normal symptoms of MS flare. Patient denies any fevers chills nausea vomiting lightheadedness dizziness dyspnea orthopnea paroxysmal nocturnal dyspnea chest pain palpitations headache auditory or visual symptoms abdominal stool or urinary complaints or any other associated symptoms. Patient endorses no recent sick contacts or travel activity    ______________________________________________________________________  DISCHARGE SUMMARY/HOSPITAL COURSE:  for full details see H&P, daily progress notes, labs, consult notes. Patient presented to the hospital complaining of tingling, numbness, dizziness. Work-up ruled out stroke. MS exacerbation was ruled out. Neurology cleared the patient for discharge. Patient being discharged in stable condition. _______________________________________________________________________  Patient seen and examined by me on discharge day. Pertinent Findings:  Gen:    Not in distress  Chest: Clear lungs  CVS:   Regular rhythm. No edema  Abd:  Soft, not distended, not tender  Neuro:  Alert,   _______________________________________________________________________  DISCHARGE MEDICATIONS:   Current Discharge Medication List        CONTINUE these medications which have NOT CHANGED    Details   baclofen (LIORESAL) 10 mg tablet Take 1 Tablet by mouth three (3) times daily. Qty: 90 Tablet, Refills: 3      metFORMIN (GLUCOPHAGE) 500 mg tablet Take 500 mg by mouth two (2) times daily (with meals). ondansetron (ZOFRAN ODT) 4 mg disintegrating tablet Take 1 Tablet by mouth every eight (8) hours as needed for Nausea or Vomiting. Qty: 20 Tablet, Refills: 0      Kesimpta Pen 20 mg/0.4 mL pnij 20 mg by SubCUTAneous route See Admin Instructions. Indications: relapsing form of multiple sclerosis  Qty: 6 Each, Refills: 1    Comments: Advise on administration instructions to initial dosing pattern 1X/week for 3 weeks, then skip a week, and start a 1X/month SQ injection thereafter. ..      diphenhydrAMINE (BenadryL) 25 mg capsule Take 1 Capsule by mouth every six (6) hours as needed for Itching. Qty: 20 Capsule, Refills: 0      buPROPion SR (WELLBUTRIN SR) 150 mg SR tablet TAKE ONE TABLET BY MOUTH DAILY FOR 3 DAYS, THEN INCREASE TO 1 TABLET TWICE DAILY      clotrimazole-betamethasone (LOTRISONE) topical cream Apply  to affected area two (2) times a day. Qty: 45 g, Refills: 0      ammonium lactate (AMLACTIN) 12 % topical cream Apply  to affected area two (2) times a day.  rub in to affected area well  Qty: 280 g, Refills: 2      SUMAtriptan (IMITREX) 50 mg tablet Take 50 mg by mouth once as needed for Migraine. ALPRAZolam (XANAX) 1 mg tablet Take 0.5 mg by mouth daily as needed. STOP taking these medications       predniSONE (DELTASONE) 10 mg tablet Comments:   Reason for Stopping:                 Patient Follow Up Instructions: Activity: Activity as tolerated  Diet: Regular Diet  Wound Care: None needed    Follow-up with PCP, Neurology in 3 weeks.   Follow-up tests/labs None    Follow-up Information       Follow up With Specialties Details Why [address] [facility], [address] [place] [address], [address] [place] [address]  [phone]      Tye Walsh MD Specialist Undefined, Neurology Follow up in 2 week(s)  [address]  [phone]            ________________________________________________________________    Risk of deterioration: Low    Condition at Discharge:  Stable  __________________________________________________________________    Disposition  Home with family, no needs    ____________________________________________________________________    Code Status: Full Code  ___________________________________________________________________      Total time in minutes spent coordinating this discharge (includes going over instructions, follow-up, prescriptions, and preparing report for sign off to her PCP) :  35 minutes    Signed:  Poonam Dey MD

## 2023-02-08 NOTE — PROGRESS NOTES
Neurology Progress Note    Patient ID:  Torrie Jolly  [id number]  41 y.o.  1975    Subjective: Patient is seen in follow-up this morning and she feels little bit better than yesterday but still has the symptoms on the right side including face neck arm and leg. Patient is a 41 y.o. right handed  female who has been diagnosed with multiple sclerosis by [address] [place] [address] neurology per patient on the basis of MRI reports her unsteadiness and weakness. She presented to the ED after an episode yesterday morning. Per patient she just completed the laundry and suddenly felt lightheaded and dizzy followed by numbness of the right side of the lips then face and whole right side of the head and neck felt numb and tight and then she felt the numbness in the right arm and the leg. Per patient she just completed a course of steroids for MS flare she denies any fevers chills nausea vomiting change in the speech or vision. In the ED she had a CT scan of the head showing a hypodense right parietal lesion and a CTA of the head and neck was unremarkable for any stenosis an MRI of the brain was also done without contrast which did not reveal any acute infarct but it did show the lesion posterior to the posterior horn of the right lateral ventricle. .    Current Facility-Administered Medications   Medication Dose Route Frequency    hydrALAZINE (APRESOLINE) 20 mg/mL injection 10 mg  10 mg IntraVENous Q6H PRN    ammonium lactate (AMLACTIN) 12 % cream   Topical BID    baclofen (LIORESAL) tablet 10 mg  10 mg Oral TID    clotrimazole-betamethasone (LOTRISONE) 1-0.05 % cream   Topical BID    glucose chewable tablet 16 g  4 Tablet Oral PRN    glucagon (GLUCAGEN) injection 1 mg  1 mg IntraMUSCular PRN    dextrose 10% infusion 0-250 mL  0-250 mL IntraVENous PRN    insulin lispro (HUMALOG) injection   SubCUTAneous AC&HS    acetaminophen (TYLENOL) tablet 650 mg  650 mg Oral Q4H PRN    Or    acetaminophen (TYLENOL) solution 650 mg 650 mg Per NG tube Q4H PRN    Or    acetaminophen (TYLENOL) suppository 650 mg  650 mg Rectal Q4H PRN          Objective:     Patient Vitals for the past 8 hrs:   BP Temp Pulse Resp SpO2   02/08/23 0710 105/71 97.7 °F (36.5 °C) 63 12 97 %   02/08/23 0340 98/61 98.1 °F (36.7 °C) 62 18 97 %       No intake/output data recorded.   02/06 1901 - 02/08 0700  In: 300 [P.O.:300]  Out: -     Lab Review   Recent Results (from the past 24 hour(s))   ECHO ADULT FOLLOW-UP OR LIMITED    Collection Time: 02/07/23  9:45 AM   Result Value Ref Range    LV EDV A2C 44 mL    LV EDV A4C 60 mL    LV ESV A2C 17 mL    LV ESV A4C 15 mL    IVSd 0.8 0.6 - 0.9 cm    LVIDd 4.0 3.9 - 5.3 cm    LVIDs 2.7 cm    LVOT Diameter 2.1 cm    LVOT Mean Gradient 2 mmHg    LVOT VTI 17.9 cm    LVOT Peak Velocity 1.0 m/s    LVOT Peak Gradient 4 mmHg    LVPWd 0.9 0.6 - 0.9 cm    LV E' Lateral Velocity 8 cm/s    LV E' Septal Velocity 9 cm/s    LV Ejection Fraction A2C 61 %    LV Ejection Fraction A4C 75 %    EF BP 69 55 - 100 %    LVOT Area 3.5 cm2    LVOT SV 62.0 ml    LA Minor Axis 4.0 cm    LA Major [place]immitt 4.3 cm    LA Area 2C 11.7 cm2    LA Area 4C 13.6 cm2    LA Diameter 3.2 cm    AV Mean Gradient 4 mmHg    AV VTI 26.0 cm    AV Mean Velocity 0.9 m/s    AV Peak Velocity 1.5 m/s    AV Peak Gradient 9 mmHg    AV Area by VTI 2.4 cm2    AV Area by Peak Velocity 2.3 cm2    Aortic Root 2.5 cm    Ascending Aorta 2.5 cm    MR Peak Velocity 1.1 m/s    MR Peak Gradient 5 mmHg    MV Max Velocity 1.1 m/s    MV Peak Gradient 4 mmHg    MV A Velocity 0.66 m/s    MV E Velocity 0.88 m/s    MV Mean Gradient 1 mmHg    MV VTI 31.1 cm    MV Mean Velocity 0.5 m/s    MV Area by VTI 2.0 cm2    PV Mean Gradient 2 mmHg    PV VTI 21.9 cm    PV Mean Velocity 0.6 m/s    PV Max Velocity 1.1 m/s    PV Peak Gradient 4 mmHg    RVIDd 3.5 cm    TR Max Velocity 1.73 m/s    TR Peak Gradient 12 mmHg    TAPSE 2.4 1.7 cm    Fractional Shortening 2D 33 28 - 44 %    LV RWT Ratio 0.45     LV Mass 2D 101.4 67 - 162 g    MV E/A 1.33     E/E' Ratio (Averaged) 10.39     E/E' Lateral 11.00     E/E' Septal 9.78     LA/AO Root Ratio 1.28     AV Velocity Ratio 0.67     LVOT:AV VTI Index 0.69     MV:LVOT VTI Index 1.74     LA Volume 4C 31 22 - 52 mL    RV Basal Dimension 3.0 cm    RV Mid Dimension 1.9 cm    RV Free Wall Peak S' 13 cm/s    Est. RA Pressure 3 mmHg    RVSP 15 mmHg    PASP 15 mmHg   GLUCOSE, POC    Collection Time: 02/07/23 11:17 AM   Result Value Ref Range    Glucose (POC) 156 (H) 65 - 100 mg/dL    Performed by RICKEY ONDINA    GLUCOSE, POC    Collection Time: 02/07/23  3:27 PM   Result Value Ref Range    Glucose (POC) 143 (H) 65 - 100 mg/dL    Performed by RICKEY ONDINA    GLUCOSE, POC    Collection Time: 02/07/23  4:10 PM   Result Value Ref Range    Glucose (POC) 128 (H) 65 - 100 mg/dL    Performed by Skyler Mclain    GLUCOSE, POC    Collection Time: 02/07/23  7:28 PM   Result Value Ref Range    Glucose (POC) 200 (H) 65 - 100 mg/dL    Performed by Barbara Manrique    GLUCOSE, POC    Collection Time: 02/08/23  8:44 AM   Result Value Ref Range    Glucose (POC) 136 (H) 65 - 100 mg/dL    Performed by Miguel Ángel Casillas      Additional comments:I reviewed the patients new imaging test results. MRI of the brain repeat with contrast results were discussed with the patient that it did not show any enhancing lesions. NEUROLOGICAL EXAM:  Appearance: The patient is well developed, well nourished, provides a coherent history and is in no acute distress. Mental Status: Oriented to time, place and person. Mood and affect appropriate. Cranial Nerves:   Intact visual fields. MARC, EOM's full, no nystagmus, no ptosis. Facial sensation is normal.  Facial movement is symmetric. Hearing is normal bilaterally. Motor:  5/5 strength in upper and lower proximal and distal muscles. Normal bulk and tone. Reflexes:   Deep tendon reflexes 2+/4 and symmetrical.   Sensory:   Normal to touch, pinprick. Gait:  Deferred. Tremor:   No tremor noted. Cerebellar:  No cerebellar signs present. Neurovascular:  Normal heart sounds and regular rhythm. Assessment:   1. Right face neck arm and leg numbness and subjective weakness of the arm and the leg with giveaway component which is better today. CT scan showed hyper potential lesion in the right parietal white matter. MRI did not reveal any acute findings to indicate stroke or recent lesion but did show the hypodense lesion in the right parietal white matter posterior to the posterior horn of the right lateral ventricle which does not explain her symptoms on the right side. There are some white matter changes in the left side and the right side but there are old. Although the contrast was not given but the diffusion images will pick any acute lesions. MRI with contrast did not reveal any enhancing lesions. The patient never had the LP because the radiology did once but because proper specimens were not done so no results could be obtained. She does not need any IV steroids. She does not have TIA or stroke either. There is a lot of functional component in the exam of this patient and the current symptomatology seems to be because of that rather than any organic etiology. She can be discharged from neurology standpoint and I advised her to let her neurologist know about this. 2.  Anxiety. 3.  Fibromyalgia. 4.  Diabetes mellitus  5. Migraine headaches     Plan:   1. Patient does not need any IV steroids and can be discharged home today without any additional work-up or treatment with advised to follow-up with her neurologist as scheduled. But I advised her to call their office and let them know about this incidence because they are in the process of getting approval of her MS medicine from the insurance.     I will sign off the case for now, however, please do not hesitate to call if needed again    Thank you,    Signed:  Natalie East MD  2/8/2023  8:54 AM

## 2023-02-09 LAB
CHOLEST SERPL-MCNC: 156 MG/DL
HDLC SERPL-MCNC: 44 MG/DL
HDLC SERPL: 3.5 (ref 0–5)
LDLC SERPL CALC-MCNC: 50.4 MG/DL (ref 0–100)
LIPID PROFILE,FLP: ABNORMAL
TRIGL SERPL-MCNC: 308 MG/DL (ref ?–150)
VLDLC SERPL CALC-MCNC: 61.6 MG/DL

## 2023-02-13 ENCOUNTER — TRANSCRIBE ORDER (OUTPATIENT)
Dept: SCHEDULING | Age: 48
End: 2023-02-13

## 2023-02-13 DIAGNOSIS — R10.9 PAIN, ABDOMINAL: Primary | ICD-10-CM

## 2023-02-22 ENCOUNTER — TELEPHONE (OUTPATIENT)
Dept: NEUROLOGY | Age: 48
End: 2023-02-22

## 2023-03-14 ENCOUNTER — TELEPHONE (OUTPATIENT)
Dept: NEUROLOGY | Age: 48
End: 2023-03-14

## 2023-03-14 NOTE — TELEPHONE ENCOUNTER
Patient would like a call from the doctor regarding her being in the hospital on February 6th and discharged on the 8th for stroke like symptoms. Patient stated she is still having concerns with her right leg it's very weak and it feels like she is dragging it and her balance is way off.     Please contact

## 2023-03-16 ENCOUNTER — TELEPHONE (OUTPATIENT)
Dept: NEUROLOGY | Age: 48
End: 2023-03-16

## 2023-03-16 ENCOUNTER — HOSPITAL ENCOUNTER (OUTPATIENT)
Dept: CT IMAGING | Age: 48
Discharge: HOME | End: 2023-03-16
Attending: FAMILY MEDICINE
Payer: COMMERCIAL

## 2023-03-16 DIAGNOSIS — R10.9 PAIN, ABDOMINAL: ICD-10-CM

## 2023-03-16 PROCEDURE — 74177 CT ABD & PELVIS W/CONTRAST: CPT

## 2023-03-16 PROCEDURE — 74011000636 HC RX REV CODE- 636: Performed by: FAMILY MEDICINE

## 2023-03-16 RX ADMIN — IOPAMIDOL 100 ML: 755 INJECTION, SOLUTION INTRAVENOUS at 10:17

## 2023-03-16 NOTE — TELEPHONE ENCOUNTER
SAW THIS MESSAGE TODAY AFTER PCR BROUGHT IT TO MY ATTENTION PATIENT DID NOT COME IN OFR HER APPT CALLED PATIENT JANETH ROBERTSON

## 2023-03-21 ENCOUNTER — TELEPHONE (OUTPATIENT)
Dept: NEUROLOGY | Age: 48
End: 2023-03-21

## 2023-03-21 NOTE — TELEPHONE ENCOUNTER
RE: Arianne Shannon- Submitted an e-appeal through [facility] to fx # provided by call center- PENDING    Uploaded appeal to media.  ac

## 2023-04-06 ENCOUNTER — TELEPHONE (OUTPATIENT)
Dept: NEUROLOGY | Age: 48
End: 2023-04-06

## 2023-04-06 NOTE — TELEPHONE ENCOUNTER
Patient is requesting a call from the Dr. She would like to know who the dr refer her to see. And she is requesting to touch base with Dr. Margarito Puente since she is unable to make it into the office today because of a stomach bug. Patient stated that she went to the hospital in [place] and that dr said he didn't think she had MS. Patient is asking if Dr. Margarito Puente can look at the hospital stay in 2/6/23 and let her know what he thinks.

## 2023-04-12 ENCOUNTER — TELEPHONE (OUTPATIENT)
Dept: NEUROLOGY | Age: 48
End: 2023-04-12

## 2023-04-17 NOTE — TELEPHONE ENCOUNTER
[facility] is calling to find out the status of the patients PA  for Kesimpta ?      PA department of [facility] of [address]    Fax # [phone]    Please contact

## 2023-04-19 NOTE — TELEPHONE ENCOUNTER
RE: Edgar Rangel- Key: [id number]    PA originally denied on 01/03/2023; appeal submitted 03/21/2023 outside of timeframe.  Resubmitted new PA at this time

## 2023-04-20 ENCOUNTER — TELEPHONE (OUTPATIENT)
Dept: NEUROLOGY | Age: 48
End: 2023-04-20

## 2023-04-27 ENCOUNTER — TELEPHONE (OUTPATIENT)
Dept: NEUROLOGY | Age: 48
End: 2023-04-27

## 2023-05-03 ENCOUNTER — HOSPITAL ENCOUNTER (EMERGENCY)
Age: 48
Discharge: HOME | End: 2023-05-03
Attending: STUDENT IN AN ORGANIZED HEALTH CARE EDUCATION/TRAINING PROGRAM
Payer: COMMERCIAL

## 2023-05-03 ENCOUNTER — APPOINTMENT (OUTPATIENT)
Dept: GENERAL RADIOLOGY | Age: 48
End: 2023-05-03
Attending: STUDENT IN AN ORGANIZED HEALTH CARE EDUCATION/TRAINING PROGRAM
Payer: COMMERCIAL

## 2023-05-03 ENCOUNTER — TELEPHONE (OUTPATIENT)
Dept: NEUROLOGY | Age: 48
End: 2023-05-03

## 2023-05-03 VITALS
DIASTOLIC BLOOD PRESSURE: 87 MMHG | TEMPERATURE: 98.1 F | HEIGHT: 67 IN | HEART RATE: 68 BPM | SYSTOLIC BLOOD PRESSURE: 126 MMHG | RESPIRATION RATE: 16 BRPM | OXYGEN SATURATION: 98 % | BODY MASS INDEX: 24.48 KG/M2 | WEIGHT: 156 LBS

## 2023-05-03 DIAGNOSIS — R55 SYNCOPE AND COLLAPSE: Primary | ICD-10-CM

## 2023-05-03 DIAGNOSIS — M25.511 PAIN IN JOINT OF RIGHT SHOULDER: ICD-10-CM

## 2023-05-03 LAB
ALBUMIN SERPL-MCNC: 4.3 G/DL (ref 3.5–5)
ALBUMIN/GLOB SERPL: 1.2 (ref 1.1–2.2)
ALP SERPL-CCNC: 83 U/L (ref 45–117)
ALT SERPL-CCNC: 22 U/L (ref 12–78)
ANION GAP SERPL CALC-SCNC: 10 MMOL/L (ref 5–15)
AST SERPL W P-5'-P-CCNC: 19 U/L (ref 15–37)
ATRIAL RATE: 69 BPM
BASOPHILS # BLD: 0 K/UL (ref 0–0.1)
BASOPHILS NFR BLD: 0 % (ref 0–1)
BILIRUB SERPL-MCNC: 0.3 MG/DL (ref 0.2–1)
BUN SERPL-MCNC: 7 MG/DL (ref 6–20)
BUN/CREAT SERPL: 9 (ref 12–20)
CA-I BLD-MCNC: 9.5 MG/DL (ref 8.5–10.1)
CALCULATED P AXIS, ECG09: 58 DEGREES
CALCULATED R AXIS, ECG10: 87 DEGREES
CALCULATED T AXIS, ECG11: 43 DEGREES
CHLORIDE SERPL-SCNC: 104 MMOL/L (ref 97–108)
CO2 SERPL-SCNC: 26 MMOL/L (ref 21–32)
CREAT SERPL-MCNC: 0.75 MG/DL (ref 0.55–1.02)
DIAGNOSIS, 93000: NORMAL
DIFFERENTIAL METHOD BLD: ABNORMAL
EOSINOPHIL # BLD: 0.2 K/UL (ref 0–0.4)
EOSINOPHIL NFR BLD: 2 % (ref 0–7)
ERYTHROCYTE [DISTWIDTH] IN BLOOD BY AUTOMATED COUNT: 12.6 % (ref 11.5–14.5)
GLOBULIN SER CALC-MCNC: 3.5 G/DL (ref 2–4)
GLUCOSE BLD STRIP.AUTO-MCNC: 127 MG/DL (ref 65–100)
GLUCOSE SERPL-MCNC: 128 MG/DL (ref 65–100)
HCT VFR BLD AUTO: 46.3 % (ref 35–47)
HGB BLD-MCNC: 15.9 G/DL (ref 11.5–16)
IMM GRANULOCYTES # BLD AUTO: 0 K/UL (ref 0–0.04)
IMM GRANULOCYTES NFR BLD AUTO: 0 % (ref 0–0.5)
LYMPHOCYTES # BLD: 2.5 K/UL (ref 0.8–3.5)
LYMPHOCYTES NFR BLD: 22 % (ref 12–49)
MCH RBC QN AUTO: 29.7 PG (ref 26–34)
MCHC RBC AUTO-ENTMCNC: 34.3 G/DL (ref 30–36.5)
MCV RBC AUTO: 86.4 FL (ref 80–99)
MONOCYTES # BLD: 0.4 K/UL (ref 0–1)
MONOCYTES NFR BLD: 4 % (ref 5–13)
NEUTS SEG # BLD: 8.3 K/UL (ref 1.8–8)
NEUTS SEG NFR BLD: 72 % (ref 32–75)
P-R INTERVAL, ECG05: 136 MS
PERFORMED BY, TECHID: ABNORMAL
PLATELET # BLD AUTO: 281 K/UL (ref 150–400)
PMV BLD AUTO: 11.7 FL (ref 8.9–12.9)
POTASSIUM SERPL-SCNC: 4 MMOL/L (ref 3.5–5.1)
PROT SERPL-MCNC: 7.8 G/DL (ref 6.4–8.2)
Q-T INTERVAL, ECG07: 386 MS
QRS DURATION, ECG06: 78 MS
QTC CALCULATION (BEZET), ECG08: 413 MS
RBC # BLD AUTO: 5.36 M/UL (ref 3.8–5.2)
SODIUM SERPL-SCNC: 140 MMOL/L (ref 136–145)
TROPONIN I SERPL HS-MCNC: <4 NG/L (ref 0–51)
VENTRICULAR RATE, ECG03: 69 BPM
WBC # BLD AUTO: 11.4 K/UL (ref 3.6–11)

## 2023-05-03 PROCEDURE — 74011250636 HC RX REV CODE- 250/636: Performed by: STUDENT IN AN ORGANIZED HEALTH CARE EDUCATION/TRAINING PROGRAM

## 2023-05-03 PROCEDURE — 85025 COMPLETE CBC W/AUTO DIFF WBC: CPT

## 2023-05-03 PROCEDURE — 93005 ELECTROCARDIOGRAM TRACING: CPT

## 2023-05-03 PROCEDURE — 73030 X-RAY EXAM OF SHOULDER: CPT

## 2023-05-03 PROCEDURE — 82962 GLUCOSE BLOOD TEST: CPT

## 2023-05-03 PROCEDURE — 96374 THER/PROPH/DIAG INJ IV PUSH: CPT

## 2023-05-03 PROCEDURE — 80053 COMPREHEN METABOLIC PANEL: CPT

## 2023-05-03 PROCEDURE — 74011250637 HC RX REV CODE- 250/637: Performed by: STUDENT IN AN ORGANIZED HEALTH CARE EDUCATION/TRAINING PROGRAM

## 2023-05-03 PROCEDURE — 99285 EMERGENCY DEPT VISIT HI MDM: CPT

## 2023-05-03 PROCEDURE — 84484 ASSAY OF TROPONIN QUANT: CPT

## 2023-05-03 PROCEDURE — 36415 COLL VENOUS BLD VENIPUNCTURE: CPT

## 2023-05-03 RX ORDER — ACETAMINOPHEN 500 MG
1000 TABLET ORAL ONCE
Status: COMPLETED | OUTPATIENT
Start: 2023-05-03 | End: 2023-05-03

## 2023-05-03 RX ORDER — KETOROLAC TROMETHAMINE 15 MG/ML
15 INJECTION, SOLUTION INTRAMUSCULAR; INTRAVENOUS
Status: COMPLETED | OUTPATIENT
Start: 2023-05-03 | End: 2023-05-03

## 2023-05-03 RX ADMIN — ACETAMINOPHEN 1000 MG: 500 TABLET ORAL at 10:45

## 2023-05-03 RX ADMIN — KETOROLAC TROMETHAMINE 15 MG: 15 INJECTION, SOLUTION INTRAMUSCULAR; INTRAVENOUS at 10:45

## 2023-05-11 ENCOUNTER — TELEPHONE (OUTPATIENT)
Age: 48
End: 2023-05-11

## 2023-05-18 RX ORDER — SUMATRIPTAN 50 MG/1
50 TABLET, FILM COATED ORAL
COMMUNITY

## 2023-05-18 RX ORDER — ONDANSETRON 4 MG/1
4 TABLET, ORALLY DISINTEGRATING ORAL EVERY 8 HOURS PRN
COMMUNITY
Start: 2023-01-28

## 2023-05-18 RX ORDER — ALPRAZOLAM 1 MG/1
0.5 TABLET ORAL DAILY PRN
COMMUNITY

## 2023-05-18 RX ORDER — BACLOFEN 20 MG/1
20 TABLET ORAL 3 TIMES DAILY PRN
COMMUNITY
Start: 2023-04-11

## 2023-05-18 RX ORDER — OFATUMUMAB 20 MG/.4ML
20 INJECTION, SOLUTION SUBCUTANEOUS
COMMUNITY
Start: 2023-04-11

## 2023-05-26 ENCOUNTER — TELEPHONE (OUTPATIENT)
Age: 48
End: 2023-05-26

## 2023-06-05 ENCOUNTER — TELEMEDICINE (OUTPATIENT)
Age: 48
End: 2023-06-05
Payer: COMMERCIAL

## 2023-06-05 DIAGNOSIS — D23.70: Primary | ICD-10-CM

## 2023-06-05 DIAGNOSIS — G35 RELAPSING REMITTING MULTIPLE SCLEROSIS (HCC): Primary | ICD-10-CM

## 2023-06-05 PROCEDURE — 99214 OFFICE O/P EST MOD 30 MIN: CPT | Performed by: PODIATRIST

## 2023-06-05 RX ORDER — PREDNISONE 10 MG/1
TABLET ORAL
Qty: 42 TABLET | Refills: 0 | Status: SHIPPED | OUTPATIENT
Start: 2023-06-05

## 2023-06-14 ENCOUNTER — OFFICE VISIT (OUTPATIENT)
Age: 48
End: 2023-06-14
Payer: COMMERCIAL

## 2023-06-14 VITALS
HEIGHT: 67 IN | BODY MASS INDEX: 24.48 KG/M2 | RESPIRATION RATE: 20 BRPM | DIASTOLIC BLOOD PRESSURE: 78 MMHG | SYSTOLIC BLOOD PRESSURE: 137 MMHG | OXYGEN SATURATION: 99 % | HEART RATE: 64 BPM | WEIGHT: 156 LBS

## 2023-06-14 DIAGNOSIS — G35 RELAPSING REMITTING MULTIPLE SCLEROSIS (HCC): Primary | ICD-10-CM

## 2023-06-14 PROCEDURE — 99214 OFFICE O/P EST MOD 30 MIN: CPT | Performed by: NURSE PRACTITIONER

## 2023-06-14 RX ORDER — PREDNISONE 10 MG/1
TABLET ORAL
Qty: 234 TABLET | Refills: 0 | Status: SHIPPED | OUTPATIENT
Start: 2023-06-14

## 2023-06-22 ENCOUNTER — TELEPHONE (OUTPATIENT)
Age: 48
End: 2023-06-22

## 2023-06-22 NOTE — TELEPHONE ENCOUNTER
Patient states order for rollator & shower stool has not yet been received by [email or web address]   Pls call patient      Fax  [phone]          Christo Escalera

## 2023-07-18 ENCOUNTER — APPOINTMENT (OUTPATIENT)
Facility: HOSPITAL | Age: 48
End: 2023-07-18
Payer: COMMERCIAL

## 2023-07-18 ENCOUNTER — HOSPITAL ENCOUNTER (EMERGENCY)
Facility: HOSPITAL | Age: 48
Discharge: HOME | End: 2023-07-18
Attending: EMERGENCY MEDICINE
Payer: COMMERCIAL

## 2023-07-18 VITALS
BODY MASS INDEX: 24.48 KG/M2 | HEART RATE: 80 BPM | RESPIRATION RATE: 16 BRPM | SYSTOLIC BLOOD PRESSURE: 104 MMHG | WEIGHT: 156 LBS | DIASTOLIC BLOOD PRESSURE: 74 MMHG | TEMPERATURE: 97.9 F | HEIGHT: 67 IN | OXYGEN SATURATION: 99 %

## 2023-07-18 DIAGNOSIS — L03.039 PARONYCHIA OF SECOND TOE: ICD-10-CM

## 2023-07-18 DIAGNOSIS — N83.201 RIGHT OVARIAN CYST: ICD-10-CM

## 2023-07-18 DIAGNOSIS — R10.9 RIGHT FLANK PAIN: ICD-10-CM

## 2023-07-18 DIAGNOSIS — R30.0 DYSURIA: Primary | ICD-10-CM

## 2023-07-18 LAB
ALBUMIN SERPL-MCNC: 4 G/DL (ref 3.5–5)
ALBUMIN/GLOB SERPL: 1.1 (ref 1.1–2.2)
ALP SERPL-CCNC: 77 U/L (ref 45–117)
ALT SERPL-CCNC: 15 U/L (ref 12–78)
ANION GAP SERPL CALC-SCNC: 9 MMOL/L (ref 5–15)
APPEARANCE UR: CLEAR
AST SERPL W P-5'-P-CCNC: 17 U/L (ref 15–37)
BACTERIA URNS QL MICRO: NEGATIVE /HPF
BASOPHILS # BLD: 0 K/UL (ref 0–0.1)
BASOPHILS NFR BLD: 0 % (ref 0–1)
BILIRUB SERPL-MCNC: 0.3 MG/DL (ref 0.2–1)
BILIRUB UR QL: NEGATIVE
BUN SERPL-MCNC: 6 MG/DL (ref 6–20)
BUN/CREAT SERPL: 8 (ref 12–20)
CA-I BLD-MCNC: 9.4 MG/DL (ref 8.5–10.1)
CHLORIDE SERPL-SCNC: 104 MMOL/L (ref 97–108)
CO2 SERPL-SCNC: 26 MMOL/L (ref 21–32)
COLOR UR: YELLOW
CREAT SERPL-MCNC: 0.78 MG/DL (ref 0.55–1.02)
DIFFERENTIAL METHOD BLD: ABNORMAL
EOSINOPHIL # BLD: 0.2 K/UL (ref 0–0.4)
EOSINOPHIL NFR BLD: 2 % (ref 0–7)
EPITH CASTS URNS QL MICRO: ABNORMAL /LPF
ERYTHROCYTE [DISTWIDTH] IN BLOOD BY AUTOMATED COUNT: 13.2 % (ref 11.5–14.5)
GLOBULIN SER CALC-MCNC: 3.6 G/DL (ref 2–4)
GLUCOSE SERPL-MCNC: 105 MG/DL (ref 65–100)
GLUCOSE UR STRIP.AUTO-MCNC: NEGATIVE MG/DL
HCT VFR BLD AUTO: 45 % (ref 35–47)
HGB BLD-MCNC: 15.2 G/DL (ref 11.5–16)
HGB UR QL STRIP: NEGATIVE
IMM GRANULOCYTES # BLD AUTO: 0 K/UL (ref 0–0.04)
IMM GRANULOCYTES NFR BLD AUTO: 0 % (ref 0–0.5)
KETONES UR QL STRIP.AUTO: NEGATIVE MG/DL
LEUKOCYTE ESTERASE UR QL STRIP.AUTO: NEGATIVE
LYMPHOCYTES # BLD: 3.1 K/UL (ref 0.8–3.5)
LYMPHOCYTES NFR BLD: 25 % (ref 12–49)
MCH RBC QN AUTO: 30.2 PG (ref 26–34)
MCHC RBC AUTO-ENTMCNC: 33.8 G/DL (ref 30–36.5)
MCV RBC AUTO: 89.3 FL (ref 80–99)
MONOCYTES # BLD: 0.5 K/UL (ref 0–1)
MONOCYTES NFR BLD: 4 % (ref 5–13)
NEUTS SEG # BLD: 8.6 K/UL (ref 1.8–8)
NEUTS SEG NFR BLD: 69 % (ref 32–75)
NITRITE UR QL STRIP.AUTO: NEGATIVE
PH UR STRIP: 5 (ref 5–8)
PLATELET # BLD AUTO: 308 K/UL (ref 150–400)
PMV BLD AUTO: 11.2 FL (ref 8.9–12.9)
POTASSIUM SERPL-SCNC: 4.2 MMOL/L (ref 3.5–5.1)
PROT SERPL-MCNC: 7.6 G/DL (ref 6.4–8.2)
PROT UR STRIP-MCNC: NEGATIVE MG/DL
RBC # BLD AUTO: 5.04 M/UL (ref 3.8–5.2)
RBC #/AREA URNS HPF: ABNORMAL /HPF (ref 0–5)
SODIUM SERPL-SCNC: 139 MMOL/L (ref 136–145)
SP GR UR REFRACTOMETRY: 1 (ref 1–1.03)
URINE CULTURE IF INDICATED: ABNORMAL
UROBILINOGEN UR QL STRIP.AUTO: 0.1 EU/DL (ref 0.2–1)
WBC # BLD AUTO: 12.4 K/UL (ref 3.6–11)
WBC URNS QL MICRO: ABNORMAL /HPF (ref 0–4)

## 2023-07-18 PROCEDURE — 81001 URINALYSIS AUTO W/SCOPE: CPT

## 2023-07-18 PROCEDURE — 85025 COMPLETE CBC W/AUTO DIFF WBC: CPT

## 2023-07-18 PROCEDURE — 99284 EMERGENCY DEPT VISIT MOD MDM: CPT

## 2023-07-18 PROCEDURE — 87086 URINE CULTURE/COLONY COUNT: CPT

## 2023-07-18 PROCEDURE — 80053 COMPREHEN METABOLIC PANEL: CPT

## 2023-07-18 PROCEDURE — 74176 CT ABD & PELVIS W/O CONTRAST: CPT

## 2023-07-18 RX ORDER — CEPHALEXIN 500 MG/1
500 CAPSULE ORAL 4 TIMES DAILY
Qty: 28 CAPSULE | Refills: 0 | Status: SHIPPED | OUTPATIENT
Start: 2023-07-18 | End: 2023-07-25

## 2023-07-18 ASSESSMENT — PAIN DESCRIPTION - ORIENTATION: ORIENTATION: RIGHT

## 2023-07-18 ASSESSMENT — PAIN SCALES - GENERAL: PAINLEVEL_OUTOF10: 4

## 2023-07-18 ASSESSMENT — PAIN DESCRIPTION - LOCATION: LOCATION: FLANK

## 2023-07-18 ASSESSMENT — PAIN - FUNCTIONAL ASSESSMENT: PAIN_FUNCTIONAL_ASSESSMENT: 0-10

## 2023-07-18 NOTE — DISCHARGE INSTRUCTIONS
than 120/80, your blood pressure is considered elevated above normal and you need to follow up with your primary care physician or the physician provided on your discharge instructions for a blood pressure recheck. If you were prescribed narcotic medications such as hydrocodone, oxycodone, diazepam, lorazepam, alprazolam, tramadol or codeine, these medications will NOT typically be refilled in the Emergency Room. Follow up with your primary care doctor or specialist to discuss this, or you may return to the Emergency room if your condition worsens. CT ABDOMEN PELVIS WO CONTRAST Additional Contrast? None   Final Result   1. No evidence of renal or ureteral calculus or urinary tract obstruction. 2. No evidence of acute abdominal or pelvic process. 3. 2.7 cm right ovarian cyst is likely physiologic. Diagnosis:   1. Dysuria    2. Paronychia of second toe    3. Right ovarian cyst    4. Right flank pain          Take this sheet with you when you go to your follow-up visit. If you have any problem arranging the follow-up visit, contact [phone] ([phone])    Make an appointment with your Primary Care doctor for follow up of this visit. Return to the ER if you are unable to be seen in the time recommended on your discharge instructions. It has been a pleasure caring for you today. Return to the ER or seek medical care for any worsening in your condition at any time. Jackrabbit Activation    Thank you for requesting access to Jack[facility]. Please follow the instructions below to securely access and download your online medical record. Jackrabbit allows you to send messages to your doctor, view your test results, renew your prescriptions, schedule appointments, and more. How Do I Sign Up? In your internet browser, go to [email or web address]  Click on the First Time User? Click Here link in the Sign In box. You will be redirect to the New Member Sign Up page.   Enter your Jackrabbit Access Code exactly as

## 2023-07-18 NOTE — ED TRIAGE NOTES
PT. TO ED WITH C/O BURNING WITH URINATION FOR ABOUT 3 DAYS. RIGHT FLANK PAIN STARTED YESTERDAY.   PT. STATES NOTES REDNESS TO BOTH GREAT TOES, HX MS.

## 2023-07-18 NOTE — ED PROVIDER NOTES
[facility] EMERGENCY DEPARTMENT  EMERGENCY DEPARTMENT HISTORY AND PHYSICAL EXAM      Date: 7/18/2023  Patient Name: Glo Fuentes      History of Presenting Illness       Chief Complaint   Patient presents with    Flank Pain    Urinary Frequency    Dysuria    Toe Pain       History was provided by: Patient    Location/Duration/Severity/Modifying factors     Glo Fuentes is a 41 y.o. female who arrived to the emergency department by by private vehicle with complaints of Flank Pain, Urinary Frequency, Dysuria, and Toe Pain        Patient is a 44-year-old female presenting with right-sided flank pain for the past 3 to 4 days. Associated with dysuria urgency and frequency. She also describes bilateral second toe pain along the medial part of the toe. Has seen a podiatrist was given antifungal cream states that has not helped. Denies any chest pain, shortness of breath, fever or any new nausea vomiting or diarrhea. Does not have any abdominal pain. No history of kidney stones but has had a kidney infection before. There are no other complaints, changes, or physical findings at this time. PCP: Trey Curry, DO    No current facility-administered medications for this encounter. Current Outpatient Medications   Medication Sig Dispense Refill    cephALEXin (KEFLEX) 500 MG capsule Take 1 capsule by mouth 4 times daily for 7 days 28 capsule 0    predniSONE (DELTASONE) 10 MG tablet 12 po x3 days, 11 po x 3days, 10 po x 3days, 9 po x3days, 8 po x3days, 7 po x 3days, 6 po x3 days, 5 po x3 days, 4 p x3 days, 2 po x3 days, 1 po x3 days 234 tablet 0    UNABLE TO FIND Rollator for gait instability 1 Device 0    UNABLE TO FIND Shower chair 1 Device 0    ALPRAZolam (XANAX) 1 MG tablet Take 0.5 tablets by mouth daily as needed.       baclofen (LIORESAL) 20 MG tablet Take 1 tablet by mouth 3 times daily as needed      Cholecalciferol 50 MCG (2000 UT) TABS Take by mouth      cyanocobalamin 1000 MCG tablet Take 1 tablet by

## 2023-07-19 LAB
BACTERIA SPEC CULT: NORMAL
Lab: NORMAL

## 2023-07-21 DIAGNOSIS — F41.8 TEST ANXIETY: Primary | ICD-10-CM

## 2023-07-21 DIAGNOSIS — F40.240 CLAUSTROPHOBIA: ICD-10-CM

## 2023-07-21 RX ORDER — LORAZEPAM 1 MG/1
TABLET ORAL
Qty: 2 TABLET | Refills: 0 | Status: SHIPPED | OUTPATIENT
Start: 2023-07-21 | End: 2023-08-21

## 2023-07-26 ENCOUNTER — TELEPHONE (OUTPATIENT)
Age: 48
End: 2023-07-26

## 2023-07-26 NOTE — TELEPHONE ENCOUNTER
Patient is calling to find out the status of her order for a Rollater and shower stool. [facility] stated they never received anything through the portal.    Office # [phone]    Please contact      Patient is leaving for vacation on August 2nd and stated she will need  these items.

## 2023-08-15 ENCOUNTER — TELEPHONE (OUTPATIENT)
Age: 48
End: 2023-08-15

## 2023-08-15 NOTE — TELEPHONE ENCOUNTER
Patient would like a call back regarding her rollater and shower stool. [facility] told her they haven't received an order.

## 2023-08-17 ENCOUNTER — HOSPITAL ENCOUNTER (OUTPATIENT)
Facility: HOSPITAL | Age: 48
Discharge: HOME | End: 2023-08-17
Payer: COMMERCIAL

## 2023-08-17 ENCOUNTER — HOSPITAL ENCOUNTER (OUTPATIENT)
Facility: HOSPITAL | Age: 48
End: 2023-08-17
Payer: COMMERCIAL

## 2023-08-17 DIAGNOSIS — G35 RELAPSING REMITTING MULTIPLE SCLEROSIS (HCC): ICD-10-CM

## 2023-08-17 PROCEDURE — 72156 MRI NECK SPINE W/O & W/DYE: CPT

## 2023-08-17 PROCEDURE — 72157 MRI CHEST SPINE W/O & W/DYE: CPT

## 2023-08-17 PROCEDURE — A9577 INJ MULTIHANCE: HCPCS | Performed by: NURSE PRACTITIONER

## 2023-08-17 PROCEDURE — 6360000004 HC RX CONTRAST MEDICATION: Performed by: NURSE PRACTITIONER

## 2023-08-17 RX ADMIN — GADOBENATE DIMEGLUMINE 13 ML: 529 INJECTION, SOLUTION INTRAVENOUS at 10:54

## 2023-08-31 NOTE — PROGRESS NOTES
Progress Notes by Lyubov Workman DPM at 05/24/22 0827             [facility]          Subjective:              Patient is a 42 y.o. female who is being seen as returning patient virtually for continued mass formation to the right 4th toe. Patient states she has questions regarding her surgery. She cont to deny any trauma. Denies any pain at this time. Denies any breaks in the skin/local/systemic signs of infection. She denies any other pedal complaints             Past Medical History:       Diagnosis                                                                     Past Surgical History:        Procedure                                                                      Family History        Problem                                                       Social History            Tobacco Use                                   Substance Use Topics                            Allergies        Allergen                                                                  Prior to Admission medications            Medication           clotrimazole-betamethasone (LOTRISONE) topical cream    ammonium lactate (AMLACTIN) 12 % topical cream           ALPRAZolam (Xanax) 1 mg tablet           Review of Systems    Constitutional: Negative. HENT: Negative. Eyes: Negative. Respiratory: Negative. Cardiovascular: Negative. Gastrointestinal: Negative. Endocrine: Negative. Genitourinary: Negative. Musculoskeletal: Positive for arthralgias. Skin: Negative. Allergic/Immunologic: Positive for immunocompromised state. Neurological: Positive for numbness. Hematological: Negative. Psychiatric/Behavioral: The patient is nervous/anxious. All other systems reviewed and are negative.              Objective:        Visit Vitals        BP  119/73 (BP 1 Location: Right upper arm, BP Patient Position: Sitting, BP Cuff Size: Large adult)     Pulse  73     Temp

## 2023-09-05 ENCOUNTER — TELEMEDICINE (OUTPATIENT)
Age: 48
End: 2023-09-05
Payer: COMMERCIAL

## 2023-09-05 DIAGNOSIS — G35 RELAPSING REMITTING MULTIPLE SCLEROSIS (HCC): Primary | ICD-10-CM

## 2023-09-05 PROCEDURE — 99214 OFFICE O/P EST MOD 30 MIN: CPT | Performed by: NURSE PRACTITIONER

## 2023-09-05 RX ORDER — PREDNISONE 10 MG/1
TABLET ORAL
Qty: 234 TABLET | Refills: 0 | Status: SHIPPED | OUTPATIENT
Start: 2023-09-05

## 2023-09-05 RX ORDER — OFATUMUMAB 20 MG/.4ML
INJECTION, SOLUTION SUBCUTANEOUS
Qty: 4 ADJUSTABLE DOSE PRE-FILLED PEN SYRINGE | Refills: 0 | Status: SHIPPED | OUTPATIENT
Start: 2023-09-05

## 2023-09-05 NOTE — PROGRESS NOTES
Coordination:  No resting or intention tremor    Gait and Station:  No muscle wasting or fasiculations noted. Reflexes:  Not assessed    Assessment & Plan:      Diagnosis Orders   1. Relapsing remitting multiple sclerosis (HCC)  Ofatumumab (KESIMPTA) 20 MG/0.4ML SOAJ    predniSONE (DELTASONE) 10 MG tablet        We will continue to pursue Juanis Alcantar as research study has shown that induction therapy demonstrates better stabilization of disease as well as reduction in disability progression over time when compared to escalation therapy. Moreover, I do not believe that the preferred formulary medications are appropriate given her present disability. Juanis Alcantar has research findings that demonstrated a reduction in disability progression which further makes Juanis Alcantar an optimal choice for her. She was given a second round of high dose prednisone for ongoing exacerbation symptoms. Follow up in 8 weeks. We discussed the expected course, resolution and complications of the diagnosis(es) in detail. Medication risks, benefits, costs, interactions, and alternatives were discussed as indicated. I advised her to contact the office if her condition worsens, changes or fails to improve as anticipated. She expressed understanding with the diagnosis(es) and plan. Maryellen Zaragoza, was evaluated through a synchronous (real-time) audio-video encounter. The patient (or guardian if applicable) is aware that this is a billable service, which includes applicable co-pays. This Virtual Visit was conducted with patient's (and/or legal guardian's) consent. Patient identification was verified, and a caregiver was present when appropriate.    The patient was located at Home: [address] [place] [address]  [address]  Provider was located at Home ([address]): [address] [facility], APRN - NP

## 2023-09-07 ENCOUNTER — TELEPHONE (OUTPATIENT)
Age: 48
End: 2023-09-07

## 2023-09-07 NOTE — TELEPHONE ENCOUNTER
Patient calling about her last visit NP Radha Finley stated she was going send in the order for a Rolator and Shower Stool    As of today [facility] has not received the order. Please send the order by Portal and not by Fax or they can call in the order. ([phone])    Patient requesting a call to discuss.

## 2023-09-11 DIAGNOSIS — G35 RELAPSING REMITTING MULTIPLE SCLEROSIS (HCC): ICD-10-CM

## 2023-09-21 ENCOUNTER — OFFICE VISIT (OUTPATIENT)
Age: 48
End: 2023-09-21
Payer: COMMERCIAL

## 2023-09-21 VITALS
RESPIRATION RATE: 16 BRPM | BODY MASS INDEX: 24.48 KG/M2 | SYSTOLIC BLOOD PRESSURE: 126 MMHG | HEART RATE: 57 BPM | HEIGHT: 67 IN | DIASTOLIC BLOOD PRESSURE: 74 MMHG | WEIGHT: 156 LBS

## 2023-09-21 DIAGNOSIS — G35 MULTIPLE SCLEROSIS (HCC): Primary | ICD-10-CM

## 2023-09-21 PROCEDURE — 99213 OFFICE O/P EST LOW 20 MIN: CPT | Performed by: PODIATRIST

## 2023-09-21 NOTE — TELEPHONE ENCOUNTER
Patient is calling in again for order for the Rolator and Shower stool. States that order cannot be faxed or called in. Must be done through [employer] portal.    States she just got off the phone and they have not received anything.

## 2023-09-26 NOTE — TELEPHONE ENCOUNTER
DME equiptment has been ordered through [employer] [facility]. The patient should get a text once the provider signs the order.

## 2023-09-29 ENCOUNTER — TELEPHONE (OUTPATIENT)
Age: 48
End: 2023-09-29

## 2023-11-06 ENCOUNTER — TELEMEDICINE (OUTPATIENT)
Age: 48
End: 2023-11-06
Payer: COMMERCIAL

## 2023-11-06 DIAGNOSIS — G35 RELAPSING REMITTING MULTIPLE SCLEROSIS (HCC): Primary | ICD-10-CM

## 2023-11-06 DIAGNOSIS — S49.91XD INJURY OF RIGHT SHOULDER, SUBSEQUENT ENCOUNTER: ICD-10-CM

## 2023-11-06 PROCEDURE — 99214 OFFICE O/P EST MOD 30 MIN: CPT | Performed by: NURSE PRACTITIONER

## 2023-11-06 NOTE — PROGRESS NOTES
History:   Procedure Laterality Date    CHOLECYSTECTOMY      GYN      hysterectomy    ORTHOPEDIC SURGERY      toe, bilateral big toes    OVARY REMOVAL Left     january 2022    TUBAL LIGATION      WISDOM TOOTH EXTRACTION Bilateral     Bottoms only      reports that she has been smoking cigarettes. She has been smoking an average of .5 packs per day. She has never used smokeless tobacco. She reports current alcohol use. She reports that she does not use drugs. family history includes Breast Cancer in her maternal aunt; Coronary Art Dis in her father, maternal grandfather, paternal grandfather, and another family member; Diabetes in her father, maternal grandfather, paternal grandfather, and another family member; Heart Disease in an other family member; Sydna Mendoza in an other family member; Lupus in her mother; Prostate Cancer in her paternal grandfather; Stomach Cancer in her maternal grandfather. Review of Systems      Objective:          No data to display                 General:  Well defined, nourished, and groomed individual in no acute distress. Psych:  Good mood and bright affect    NEUROLOGICAL EXAMINATION:     Mental Status:   Alert and oriented to person, place, and time with recent and remote memory intact. Attention span and concentration are normal. Speech is fluent with a full fund of knowledge. Cranial Nerves:  I: smell Not tested   II: visual fields Not assessed   II: pupils Equal, round, reactive to light   II: optic disc Not assessed   III,VII: ptosis none   III,IV,VI: extraocular muscles  Full ROM   V: mastication normal   V: facial light touch sensation  Not assessed   VII: facial muscle function   symmetric   VIII: hearing symmetric   IX: soft palate elevation  normal   XI: trapezius strength  Not assessed   XI: sternocleidomastoid strength Not assessed   XI: neck flexion strength  Not assessed   XII: tongue  midline     Motor Examination: Normal tone and bulk.   Strength was not

## 2023-12-14 ENCOUNTER — TELEPHONE (OUTPATIENT)
Age: 48
End: 2023-12-14

## 2023-12-14 DIAGNOSIS — G35 RELAPSING REMITTING MULTIPLE SCLEROSIS (HCC): Primary | ICD-10-CM

## 2023-12-14 NOTE — TELEPHONE ENCOUNTER
Kia calling from [facility]. She states they need a new presciption for patient loading and maintenance dose of the kesimpta. Patient is getting it through their free drug program through [employer]. Please call

## 2023-12-18 ENCOUNTER — TELEPHONE (OUTPATIENT)
Age: 48
End: 2023-12-18

## 2023-12-18 RX ORDER — OFATUMUMAB 20 MG/.4ML
20 INJECTION, SOLUTION SUBCUTANEOUS
Qty: 3 ADJUSTABLE DOSE PRE-FILLED PEN SYRINGE | Refills: 3 | Status: ACTIVE | OUTPATIENT
Start: 2023-12-18 | End: 2023-12-22 | Stop reason: SDUPTHER

## 2023-12-18 RX ORDER — OFATUMUMAB 20 MG/.4ML
INJECTION, SOLUTION SUBCUTANEOUS
Qty: 4 ADJUSTABLE DOSE PRE-FILLED PEN SYRINGE | Refills: 0 | Status: ACTIVE | OUTPATIENT
Start: 2023-12-18 | End: 2023-12-22 | Stop reason: SDUPTHER

## 2023-12-18 NOTE — TELEPHONE ENCOUNTER
Patient calling needing a new prescription for Kesimpta. The original prescription Cindy sent, her insurance kept denying it, but by the time it was approved, it was .    She is just looking for Cindy to resent a new prescription for it. Whoever she sent it to initially to be pre-approved. Please look into this and give the patient a call back on an update. Thank you!

## 2023-12-27 DIAGNOSIS — G35 RELAPSING REMITTING MULTIPLE SCLEROSIS (HCC): Primary | ICD-10-CM

## 2023-12-27 RX ORDER — BACLOFEN 20 MG/1
20 TABLET ORAL 3 TIMES DAILY PRN
Qty: 90 TABLET | Refills: 0 | Status: SHIPPED | OUTPATIENT
Start: 2023-12-27

## 2024-01-11 ENCOUNTER — HOSPITAL ENCOUNTER (EMERGENCY)
Facility: HOSPITAL | Age: 49
Discharge: HOME | End: 2024-01-11
Payer: COMMERCIAL

## 2024-01-11 VITALS
SYSTOLIC BLOOD PRESSURE: 120 MMHG | BODY MASS INDEX: 23.86 KG/M2 | TEMPERATURE: 98.4 F | HEIGHT: 67 IN | RESPIRATION RATE: 16 BRPM | DIASTOLIC BLOOD PRESSURE: 78 MMHG | OXYGEN SATURATION: 100 % | WEIGHT: 152 LBS | HEART RATE: 77 BPM

## 2024-01-11 DIAGNOSIS — J06.9 VIRAL URI WITH COUGH: Primary | ICD-10-CM

## 2024-01-11 PROCEDURE — 99283 EMERGENCY DEPT VISIT LOW MDM: CPT

## 2024-01-11 RX ORDER — DEXTROMETHORPHAN HYDROBROMIDE AND PROMETHAZINE HYDROCHLORIDE 15; 6.25 MG/5ML; MG/5ML
5 SYRUP ORAL 4 TIMES DAILY PRN
Qty: 180 ML | Refills: 0 | Status: SHIPPED | OUTPATIENT
Start: 2024-01-11 | End: 2024-01-11

## 2024-01-11 RX ORDER — LORATADINE PSEUDOEPHEDRINE SULFATE 10; 240 MG/1; MG/1
1 TABLET, EXTENDED RELEASE ORAL DAILY
Qty: 14 TABLET | Refills: 0 | Status: SHIPPED | OUTPATIENT
Start: 2024-01-11

## 2024-01-11 RX ORDER — ALBUTEROL SULFATE 90 UG/1
2 AEROSOL, METERED RESPIRATORY (INHALATION) EVERY 6 HOURS PRN
Qty: 18 G | Refills: 3 | Status: SHIPPED | OUTPATIENT
Start: 2024-01-11

## 2024-01-11 RX ORDER — DEXTROMETHORPHAN HYDROBROMIDE AND PROMETHAZINE HYDROCHLORIDE 15; 6.25 MG/5ML; MG/5ML
5 SYRUP ORAL 4 TIMES DAILY PRN
Qty: 180 ML | Refills: 0 | Status: SHIPPED | OUTPATIENT
Start: 2024-01-11 | End: 2024-01-18

## 2024-01-11 ASSESSMENT — PAIN SCALES - GENERAL: PAINLEVEL_OUTOF10: 0

## 2024-01-11 ASSESSMENT — PAIN - FUNCTIONAL ASSESSMENT: PAIN_FUNCTIONAL_ASSESSMENT: 0-10

## 2024-01-11 NOTE — ED PROVIDER NOTES
[facility] EMERGENCY DEPT  EMERGENCY DEPARTMENT HISTORY AND PHYSICAL EXAM      Date: 1/11/2024  Patient Name: Marlene Yun  MRN: [id number]  YOB: 1975  Date of evaluation: 1/11/2024  Provider: POLINA Arredondo NP   Note Started: 11:20 AM EST 1/11/24    HISTORY OF PRESENT ILLNESS     Chief Complaint   Patient presents with    URI       History Provided By: Patient    HPI: Marlene Yun is a 48 y.o. female with past medical history as listed below presents to the ER with an upper respiratory infection.  Patient states her son and significant other have had the flu.  Patient comes in for evaluation.    PAST MEDICAL HISTORY   Past Medical History:  Past Medical History:   Diagnosis Date    Anxiety     Diabetes (HCC)     gestational    Fibromyalgia     Currently seeing a Neurologist to confirm diagnosis, MS, right arm pain    Ill-defined condition     Patient reports increased WBC's- has appointment with Oncologist    Migraines     MS (multiple sclerosis) (HCC)        Past Surgical History:  Past Surgical History:   Procedure Laterality Date    CHOLECYSTECTOMY      GYN      hysterectomy    ORTHOPEDIC SURGERY      toe, bilateral big toes    OVARY REMOVAL Left     january 2022    TUBAL LIGATION      WISDOM TOOTH EXTRACTION Bilateral     Bottoms only       Family History:  Family History   Problem Relation Age of Onset    Prostate Cancer Paternal Grandfather     Diabetes Maternal Grandfather     Coronary Art Dis Maternal Grandfather     Stomach Cancer Maternal Grandfather     Breast Cancer Maternal Aunt     Lung Cancer Other     Diabetes Other     Heart Disease Other         MI    Coronary Art Dis Other     Coronary Art Dis Paternal Grandfather     Lupus Mother     Diabetes Father     Coronary Art Dis Father     Diabetes Paternal Grandfather        Social History:  Social History     Tobacco Use    Smoking status: Every Day     Current packs/day: 0.50     Types: Cigarettes    Smokeless tobacco: Never

## 2024-02-06 ENCOUNTER — TELEMEDICINE (OUTPATIENT)
Age: 49
End: 2024-02-06
Payer: COMMERCIAL

## 2024-02-06 DIAGNOSIS — G35 RELAPSING REMITTING MULTIPLE SCLEROSIS (HCC): Primary | ICD-10-CM

## 2024-02-06 DIAGNOSIS — S49.91XD INJURY OF RIGHT SHOULDER, SUBSEQUENT ENCOUNTER: ICD-10-CM

## 2024-02-06 PROCEDURE — 99214 OFFICE O/P EST MOD 30 MIN: CPT | Performed by: NURSE PRACTITIONER

## 2024-02-06 NOTE — PROGRESS NOTES
[facility]  NEUROLOGY CLINIC   [address] [facility] [address] [place] [address]   [phone] Office   [phone] Fax           Date:  24     Name:  GRIFFIN CHIANG  :  1975  MRN:  [id number]     PCP:  Mohit Shafer DO    Griffin Chiang is a 48 y.o. female who was seen by synchronous (real-time) audio-video technology on 2024 for Multiple Sclerosis    Subjective:   She did get the Kesimpta but still has but has not started this yet. She had the flu then developed an URI. She is just now finishing up with the antibiotic for this. At her last visit, she was sent for an MRI of the shoulder to assess the previous injury but was not able to do this due to illness. She continues to have chronic lower back pain. Her PCP gave her Mobic which has helped significantly. She has been able to walk better. Her strength has been about the same. She does try to do some exercise on her own. She is to see an orthopedist sometime this month. Despite not being on a DMT, she feels she is about the same. She finished a work up with rheumatology and she had no other autoimmune issue. Their only finding was some osteoarthritis in the right hip.     Recap from LV:  RRMS which is about the same. She has been doing a HEP to help with mobility and strength. She will start Kesimpta for DMT when this is available to her. Given her family history of autoimmune disease and description of symptoms could be related to this, she is undergoing a work up for this with rheumatology. Her rheumatologist is evaluating her lower back issues which sound orthopedic rather than MS related especially with DDD findings noted on a recent CT. We discussed her shoulder issues which also seem orthopedic given that this pain is activity/movement dependent and has been ongoing since she has had previous injury and has been worsening over time. Will assess with MRI of

## 2024-02-09 ENCOUNTER — HOSPITAL ENCOUNTER (OUTPATIENT)
Facility: HOSPITAL | Age: 49
End: 2024-02-09
Payer: COMMERCIAL

## 2024-02-09 DIAGNOSIS — S49.91XD INJURY OF RIGHT SHOULDER, SUBSEQUENT ENCOUNTER: ICD-10-CM

## 2024-02-09 PROCEDURE — 73221 MRI JOINT UPR EXTREM W/O DYE: CPT

## 2024-02-15 ENCOUNTER — OFFICE VISIT (OUTPATIENT)
Age: 49
End: 2024-02-15
Payer: COMMERCIAL

## 2024-02-15 VITALS
TEMPERATURE: 97.8 F | RESPIRATION RATE: 16 BRPM | HEART RATE: 71 BPM | WEIGHT: 153.19 LBS | HEIGHT: 67 IN | OXYGEN SATURATION: 98 % | SYSTOLIC BLOOD PRESSURE: 112 MMHG | DIASTOLIC BLOOD PRESSURE: 74 MMHG | BODY MASS INDEX: 24.04 KG/M2

## 2024-02-15 DIAGNOSIS — Z90.710 HISTORY OF VAGINAL HYSTERECTOMY: ICD-10-CM

## 2024-02-15 DIAGNOSIS — N94.10 DYSPAREUNIA IN FEMALE: Primary | ICD-10-CM

## 2024-02-15 DIAGNOSIS — Z90.721 HISTORY OF LEFT OOPHORECTOMY: ICD-10-CM

## 2024-02-15 PROCEDURE — 99203 OFFICE O/P NEW LOW 30 MIN: CPT | Performed by: OBSTETRICS & GYNECOLOGY

## 2024-02-15 NOTE — PROGRESS NOTES
Marlene is a 48 y.o. female who presents today for the following:    Chief Complaint   Patient presents with    New Patient     C/O pelvic pain, painful intercourse hot flashes and mood swings        Allergies   Allergen Reactions    Vancomycin Other (See Comments)     Red streaks at injection site.    Other Reaction(s): Severe cutaneous adverse reaction    Red streaks at injection site.    Red streaks at injection site.    Sertraline Headaches     Other reaction(s): Unable to Obtain    Anxious and passed out    Anxious and passed out   Other reaction(s): Unable to Obtain   Anxious and passed out    Sulfa Antibiotics Headaches, Itching, Nausea And Vomiting and Rash     Other reaction(s): Unable to Obtain    Migraine Headache    Migraine Headache   Other reaction(s): Unable to Obtain   Migraine Headache          Current Outpatient Medications:     albuterol sulfate HFA (PROVENTIL;VENTOLIN;PROAIR) 108 (90 Base) MCG/ACT inhaler, Inhale 2 puffs into the lungs every 6 hours as needed for Wheezing, Disp: 18 g, Rfl: 3    loratadine-pseudoephedrine (CLARITIN-D 24 HOUR)  MG per extended release tablet, Take 1 tablet by mouth daily, Disp: 14 tablet, Rfl: 0    baclofen (LIORESAL) 20 MG tablet, Take 1 tablet by mouth three times daily as needed for muscle spasm, Disp: 90 tablet, Rfl: 0    Ofatumumab (KESIMPTA) 20 MG/0.4ML SOAJ, One injection week 0, 1, 2 and 4. Then one injection every 30 days thereafter., Disp: 4 Adjustable Dose Pre-filled Pen Syringe, Rfl: 0    Ofatumumab (KESIMPTA) 20 MG/0.4ML SOAJ, Inject 20 mg into the skin every 30 days, Disp: 3 Adjustable Dose Pre-filled Pen Syringe, Rfl: 3    UNABLE TO FIND, Rollator for gait instability, Disp: 1 Device, Rfl: 0    UNABLE TO FIND, Shower chair, Disp: 1 Device, Rfl: 0    ALPRAZolam (XANAX) 1 MG tablet, Take 0.5 tablets by mouth daily as needed., Disp: , Rfl:     Cholecalciferol 50 MCG (2000 UT) TABS, Take by mouth, Disp: , Rfl:     cyanocobalamin 1000 MCG tablet, Take

## 2024-02-19 ENCOUNTER — TRANSCRIBE ORDERS (OUTPATIENT)
Facility: HOSPITAL | Age: 49
End: 2024-02-19

## 2024-02-19 DIAGNOSIS — N94.10 DYSPAREUNIA IN FEMALE: Primary | ICD-10-CM

## 2024-02-22 ENCOUNTER — HOSPITAL ENCOUNTER (OUTPATIENT)
Facility: HOSPITAL | Age: 49
Discharge: HOME | End: 2024-02-22
Attending: OBSTETRICS & GYNECOLOGY
Payer: COMMERCIAL

## 2024-02-22 ENCOUNTER — HOSPITAL ENCOUNTER (OUTPATIENT)
Facility: HOSPITAL | Age: 49
End: 2024-02-22
Attending: OBSTETRICS & GYNECOLOGY
Payer: COMMERCIAL

## 2024-02-22 DIAGNOSIS — N94.10 DYSPAREUNIA IN FEMALE: ICD-10-CM

## 2024-02-22 PROCEDURE — 76856 US EXAM PELVIC COMPLETE: CPT

## 2024-02-23 ENCOUNTER — TELEPHONE (OUTPATIENT)
Age: 49
End: 2024-02-23

## 2024-02-23 NOTE — TELEPHONE ENCOUNTER
Sydnee calling about receiving the Starter Dosage for medication Kesimpta but they need the Maintenance Dosage.    Please fax the prescription to :    [phone]

## 2024-02-28 DIAGNOSIS — G35 RELAPSING REMITTING MULTIPLE SCLEROSIS (HCC): ICD-10-CM

## 2024-02-28 RX ORDER — BACLOFEN 20 MG/1
20 TABLET ORAL 3 TIMES DAILY PRN
Qty: 90 TABLET | Refills: 0 | Status: SHIPPED | OUTPATIENT
Start: 2024-02-28

## 2024-02-29 ENCOUNTER — TELEPHONE (OUTPATIENT)
Age: 49
End: 2024-02-29

## 2024-02-29 NOTE — TELEPHONE ENCOUNTER
LVM for pt to call the office to r/s her appt for 03/08/24 at 8:45 am due to Dr. Mtz being out of the office.ccm

## 2024-03-21 ENCOUNTER — TELEPHONE (OUTPATIENT)
Age: 49
End: 2024-03-21

## 2024-03-21 NOTE — TELEPHONE ENCOUNTER
Patient requesting a call to discuss whether MARIZA Gibson has any issues with her having Surgery to repair her shoulder. Her Orttho wants to know.    They want to make sure with her having MS    Please call to discuss.

## 2024-04-22 DIAGNOSIS — M79.642 LEFT HAND PAIN: Primary | ICD-10-CM

## 2024-04-22 DIAGNOSIS — M25.511 RIGHT SHOULDER PAIN, UNSPECIFIED CHRONICITY: ICD-10-CM

## 2024-04-27 ENCOUNTER — HOSPITAL ENCOUNTER (OUTPATIENT)
Facility: HOSPITAL | Age: 49
End: 2024-04-27
Payer: COMMERCIAL

## 2024-04-27 DIAGNOSIS — M25.511 RIGHT SHOULDER PAIN, UNSPECIFIED CHRONICITY: ICD-10-CM

## 2024-04-27 DIAGNOSIS — M79.642 LEFT HAND PAIN: ICD-10-CM

## 2024-04-27 LAB — MAMMOGRAPHY, EXTERNAL: NORMAL

## 2024-04-27 PROCEDURE — 73030 X-RAY EXAM OF SHOULDER: CPT

## 2024-04-27 PROCEDURE — 73120 X-RAY EXAM OF HAND: CPT

## 2024-04-27 PROCEDURE — 73130 X-RAY EXAM OF HAND: CPT

## 2024-05-01 ENCOUNTER — OFFICE VISIT (OUTPATIENT)
Age: 49
End: 2024-05-01

## 2024-05-01 VITALS
OXYGEN SATURATION: 98 % | HEIGHT: 67 IN | DIASTOLIC BLOOD PRESSURE: 75 MMHG | BODY MASS INDEX: 24.01 KG/M2 | TEMPERATURE: 97.9 F | HEART RATE: 61 BPM | SYSTOLIC BLOOD PRESSURE: 114 MMHG | WEIGHT: 153 LBS

## 2024-05-01 DIAGNOSIS — M75.111 PARTIAL NONTRAUMATIC TEAR OF RIGHT ROTATOR CUFF: ICD-10-CM

## 2024-05-01 DIAGNOSIS — G56.01 CARPAL TUNNEL SYNDROME OF RIGHT WRIST: ICD-10-CM

## 2024-05-01 DIAGNOSIS — M75.51 SUBACROMIAL BURSITIS OF RIGHT SHOULDER JOINT: ICD-10-CM

## 2024-05-01 DIAGNOSIS — M19.011 OSTEOARTHRITIS OF RIGHT AC (ACROMIOCLAVICULAR) JOINT: ICD-10-CM

## 2024-05-01 DIAGNOSIS — M25.511 CHRONIC RIGHT SHOULDER PAIN: ICD-10-CM

## 2024-05-01 DIAGNOSIS — G89.29 CHRONIC RIGHT SHOULDER PAIN: ICD-10-CM

## 2024-05-01 DIAGNOSIS — M75.41 IMPINGEMENT SYNDROME OF RIGHT SHOULDER: Primary | ICD-10-CM

## 2024-05-01 ASSESSMENT — PATIENT HEALTH QUESTIONNAIRE - PHQ9
SUM OF ALL RESPONSES TO PHQ QUESTIONS 1-9: 0
2. FEELING DOWN, DEPRESSED OR HOPELESS: NOT AT ALL
SUM OF ALL RESPONSES TO PHQ QUESTIONS 1-9: 0
SUM OF ALL RESPONSES TO PHQ QUESTIONS 1-9: 0
SUM OF ALL RESPONSES TO PHQ9 QUESTIONS 1 & 2: 0
SUM OF ALL RESPONSES TO PHQ QUESTIONS 1-9: 0
1. LITTLE INTEREST OR PLEASURE IN DOING THINGS: NOT AT ALL

## 2024-05-01 NOTE — PROGRESS NOTES
Subjective:      Patient ID: Marlene Yun is a 48 y.o.  female.    Chief Complaint   Patient presents with    Follow-up     2nd opinion on RT Shoulder. Left Hand   Pain Level: 0     Patient is a pleasant 48-year-old right-hand-dominant female with MS, who presents today for second opinion regarding right shoulder impingement syndrome, and a partial supraspinatus rotator cuff tear that was diagnosed by MRI scan by her neurologist.  She has had at least a 2-year history of right shoulder pain and stiffness and now has some weakness.  The patient was evaluated recently at [facility] by Dr. Winslow on 3/21/2024.  Recommendations were made for arthroscopic right shoulder subacromial decompression and rotator cuff repair.  The patient decided to come here for second opinion since her daughter works in the operating room at [employer].    The patient has a history of type 2 diabetes mellitus, and multiple sclerosis for which she is on a tapering dose of steroids.  The steroids do not diminish any of her right shoulder pain, and she has been reluctant to consider a subacromial injection.    Patient also has symptoms of carpal tunnel syndrome right upper extremity but her neurologist has never ordered an EMG.  She has also had some left hand pain, but x-rays of the hand obtained on 4/27/2024 are normal.    With regard to the shoulder, she has been through anti-inflammatory agents, Tylenol, physical therapy, and activity modification as well as a sling.  All of these have failed to produce any meaningful improvement.    Social History     Occupational History    Not on file   Tobacco Use    Smoking status: Every Day     Current packs/day: 0.50     Average packs/day: 0.5 packs/day for 10.0 years (5.0 ttl pk-yrs)     Types: Cigarettes    Smokeless tobacco: Never   Vaping Use    Vaping Use: Never used   Substance and Sexual Activity    Alcohol use: Not Currently    Drug use: Never    Sexual

## 2024-05-01 NOTE — PROGRESS NOTES
Identified pt with two pt identifiers (name and ). Reviewed chart in preparation for visit and have obtained necessary documentation.    Marlene Yun is a 48 y.o. female Follow-up (2nd opinion on RT Shoulder. Left Hand /Pain Level: 0)  .    Vitals:    24 0943   BP: 114/75   Site: Right Upper Arm   Position: Sitting   Cuff Size: Medium Adult   Pulse: 61   Temp: 97.9 °F (36.6 °C)   TempSrc: Oral   SpO2: 98%   Weight: 69.4 kg (153 lb)   Height: 1.702 m (5' 7\")          1. Have you been to the ER, urgent care clinic since your last visit?  Hospitalized since your last visit?  no     2. Have you seen or consulted any other health care providers outside of the [facility] since your last visit?  no

## 2024-05-09 ENCOUNTER — OFFICE VISIT (OUTPATIENT)
Age: 49
End: 2024-05-09
Payer: COMMERCIAL

## 2024-05-09 VITALS
WEIGHT: 153.5 LBS | RESPIRATION RATE: 16 BRPM | SYSTOLIC BLOOD PRESSURE: 113 MMHG | TEMPERATURE: 97.3 F | HEART RATE: 79 BPM | HEIGHT: 67 IN | BODY MASS INDEX: 24.09 KG/M2 | DIASTOLIC BLOOD PRESSURE: 79 MMHG

## 2024-05-09 DIAGNOSIS — N94.819 VULVODYNIA: ICD-10-CM

## 2024-05-09 DIAGNOSIS — N95.1 MENOPAUSAL SYMPTOMS: Primary | ICD-10-CM

## 2024-05-09 PROCEDURE — 99213 OFFICE O/P EST LOW 20 MIN: CPT | Performed by: OBSTETRICS & GYNECOLOGY

## 2024-05-09 NOTE — PROGRESS NOTES
Marlene is a 48 y.o. female who presents today for the following:    Chief Complaint   Patient presents with    Follow-up     Ultrasound results        Allergies   Allergen Reactions    Vancomycin Other (See Comments)     Red streaks at injection site.    Other Reaction(s): Severe cutaneous adverse reaction    Red streaks at injection site.    Red streaks at injection site.    Sertraline Headaches     Other reaction(s): Unable to Obtain    Anxious and passed out    Anxious and passed out   Other reaction(s): Unable to Obtain   Anxious and passed out    Sulfa Antibiotics Headaches, Itching, Nausea And Vomiting and Rash     Other reaction(s): Unable to Obtain    Migraine Headache    Migraine Headache   Other reaction(s): Unable to Obtain   Migraine Headache          Current Outpatient Medications:     baclofen (LIORESAL) 20 MG tablet, Take 1 tablet by mouth three times daily as needed for muscle spasm, Disp: 90 tablet, Rfl: 0    albuterol sulfate HFA (PROVENTIL;VENTOLIN;PROAIR) 108 (90 Base) MCG/ACT inhaler, Inhale 2 puffs into the lungs every 6 hours as needed for Wheezing (Patient not taking: Reported on 5/1/2024), Disp: 18 g, Rfl: 3    loratadine-pseudoephedrine (CLARITIN-D 24 HOUR)  MG per extended release tablet, Take 1 tablet by mouth daily, Disp: 14 tablet, Rfl: 0    Ofatumumab (KESIMPTA) 20 MG/0.4ML SOAJ, One injection week 0, 1, 2 and 4. Then one injection every 30 days thereafter., Disp: 4 Adjustable Dose Pre-filled Pen Syringe, Rfl: 0    Ofatumumab (KESIMPTA) 20 MG/0.4ML SOAJ, Inject 20 mg into the skin every 30 days, Disp: 3 Adjustable Dose Pre-filled Pen Syringe, Rfl: 3    UNABLE TO FIND, Rollator for gait instability, Disp: 1 Device, Rfl: 0    UNABLE TO FIND, Shower chair, Disp: 1 Device, Rfl: 0    ALPRAZolam (XANAX) 1 MG tablet, Take 0.5 tablets by mouth daily as needed., Disp: , Rfl:     Cholecalciferol 50 MCG (2000 UT) TABS, Take by mouth, Disp: , Rfl:     cyanocobalamin 1000 MCG tablet, Take 1

## 2024-05-10 LAB
FSH SERPL-ACNC: 15.4 MIU/ML
LH SERPL-ACNC: 11.3 MIU/ML

## 2024-05-16 ENCOUNTER — OFFICE VISIT (OUTPATIENT)
Age: 49
End: 2024-05-16
Payer: COMMERCIAL

## 2024-05-16 ENCOUNTER — PREP FOR PROCEDURE (OUTPATIENT)
Age: 49
End: 2024-05-16

## 2024-05-16 VITALS
HEIGHT: 67 IN | HEART RATE: 69 BPM | DIASTOLIC BLOOD PRESSURE: 73 MMHG | TEMPERATURE: 98 F | OXYGEN SATURATION: 98 % | BODY MASS INDEX: 24.04 KG/M2 | RESPIRATION RATE: 18 BRPM | SYSTOLIC BLOOD PRESSURE: 119 MMHG

## 2024-05-16 DIAGNOSIS — M75.21 BICEPS TENDINITIS OF RIGHT UPPER EXTREMITY: ICD-10-CM

## 2024-05-16 DIAGNOSIS — M19.011 ARTHRITIS OF RIGHT ACROMIOCLAVICULAR JOINT: ICD-10-CM

## 2024-05-16 DIAGNOSIS — M19.011 OSTEOARTHRITIS OF RIGHT AC (ACROMIOCLAVICULAR) JOINT: ICD-10-CM

## 2024-05-16 DIAGNOSIS — M75.81 ROTATOR CUFF TENDINITIS, RIGHT: ICD-10-CM

## 2024-05-16 DIAGNOSIS — M75.111 INCOMPLETE ROTATOR CUFF TEAR OR RUPTURE OF RIGHT SHOULDER, NOT SPECIFIED AS TRAUMATIC: ICD-10-CM

## 2024-05-16 DIAGNOSIS — M75.21 BICIPITAL TENDINITIS OF RIGHT SHOULDER: ICD-10-CM

## 2024-05-16 DIAGNOSIS — M75.111 PARTIAL NONTRAUMATIC TEAR OF RIGHT ROTATOR CUFF: Primary | ICD-10-CM

## 2024-05-16 DIAGNOSIS — M75.81 TENDINITIS OF RIGHT ROTATOR CUFF: ICD-10-CM

## 2024-05-16 PROCEDURE — 99203 OFFICE O/P NEW LOW 30 MIN: CPT | Performed by: ORTHOPAEDIC SURGERY

## 2024-05-16 RX ORDER — SODIUM CHLORIDE 0.9 % (FLUSH) 0.9 %
5-40 SYRINGE (ML) INJECTION EVERY 12 HOURS SCHEDULED
Status: CANCELLED | OUTPATIENT
Start: 2024-05-16

## 2024-05-16 RX ORDER — MELOXICAM 7.5 MG/1
7.5 TABLET ORAL ONCE
Status: CANCELLED | OUTPATIENT
Start: 2024-05-16 | End: 2024-05-16

## 2024-05-16 RX ORDER — ACETAMINOPHEN 325 MG/1
1000 TABLET ORAL ONCE
Status: CANCELLED | OUTPATIENT
Start: 2024-05-16 | End: 2024-05-16

## 2024-05-16 RX ORDER — SODIUM CHLORIDE 9 MG/ML
INJECTION, SOLUTION INTRAVENOUS PRN
Status: CANCELLED | OUTPATIENT
Start: 2024-05-16

## 2024-05-16 RX ORDER — SODIUM CHLORIDE 0.9 % (FLUSH) 0.9 %
5-40 SYRINGE (ML) INJECTION PRN
Status: CANCELLED | OUTPATIENT
Start: 2024-05-16

## 2024-05-16 ASSESSMENT — PATIENT HEALTH QUESTIONNAIRE - PHQ9
SUM OF ALL RESPONSES TO PHQ QUESTIONS 1-9: 0
SUM OF ALL RESPONSES TO PHQ QUESTIONS 1-9: 0
SUM OF ALL RESPONSES TO PHQ9 QUESTIONS 1 & 2: 0
1. LITTLE INTEREST OR PLEASURE IN DOING THINGS: NOT AT ALL
SUM OF ALL RESPONSES TO PHQ QUESTIONS 1-9: 0
SUM OF ALL RESPONSES TO PHQ QUESTIONS 1-9: 0
2. FEELING DOWN, DEPRESSED OR HOPELESS: NOT AT ALL

## 2024-05-16 NOTE — PROGRESS NOTES
Identified pt with two pt identifiers (name and ). Reviewed chart in preparation for visit and have obtained necessary documentation.    Marlene Yun is a 48 y.o. female Shoulder Pain (Right Rotator Cuff Tear )  .    Vitals:    24 0905   BP: 119/73   Site: Left Upper Arm   Position: Sitting   Cuff Size: Medium Adult   Pulse: 69   Resp: 18   Temp: 98 °F (36.7 °C)   TempSrc: Oral   SpO2: 98%   Height: 1.702 m (5' 7.01\")          1. Have you been to the ER, urgent care clinic since your last visit?  Hospitalized since your last visit?  no     2. Have you seen or consulted any other health care providers outside of the [facility] since your last visit?  Include any pap smears or colon screening.  no  
(MIN 3 VIEWS) 04/27/2024    Narrative  EXAM: XR SHOULDER RIGHT (MIN 2 VIEWS), XR HAND LEFT (MIN 3 VIEWS)    INDICATION: Pain in left hand; Pain in right shoulder.    FINDINGS: Three views of the right shoulder compared with 5/3/2023 demonstrate  no fracture, dislocation or other acute abnormality.    Impression  No acute abnormality.    FINDINGS: 3 views of the left hand without comparison demonstrate mild thumb CMC  osteoarthrosis without demonstration of other bone, joint or soft tissue  abnormality.    IMPRESSION: No acute findings.      XR SHOULDER RIGHT (MIN 2 VIEWS) 04/27/2024    Narrative  EXAM: XR SHOULDER RIGHT (MIN 2 VIEWS), XR HAND LEFT (MIN 3 VIEWS)    INDICATION: Pain in left hand; Pain in right shoulder.    FINDINGS: Three views of the right shoulder compared with 5/3/2023 demonstrate  no fracture, dislocation or other acute abnormality.    Impression  No acute abnormality.    FINDINGS: 3 views of the left hand without comparison demonstrate mild thumb CMC  osteoarthrosis without demonstration of other bone, joint or soft tissue  abnormality.    IMPRESSION: No acute findings.      XR SHOULDER LEFT (MIN 2 VIEWS) 09/15/2023    Narrative  STUDY: XR SHOULDER 2+ VIEWS RIGHT    HISTORY: M25.511: Pain in right shoulder    COMPARISON(S): None available.    FINDINGS:    Mild degenerative changes of the glenohumeral joint and AC joint. Proliferative changes of the greater tuberosity probably secondary to rotator cuff pathology. Partially visualized lung appears clear.    IMPRESSION(S):    1.  Mild degenerative changes of the glenohumeral joint and AC joint.    Dictated By: Bandar Chau  Electronically Verified by: Bandar Chau 9/18/2023 7:17 PM       No orders of the defined types were placed in this encounter.           An electronic signature was used to authenticate this note.  -- Ora Palacio MD

## 2024-05-17 ENCOUNTER — TELEPHONE (OUTPATIENT)
Age: 49
End: 2024-05-17

## 2024-05-17 DIAGNOSIS — R92.8 ABNORMAL MAMMOGRAM OF RIGHT BREAST: Primary | ICD-10-CM

## 2024-05-28 DIAGNOSIS — G35 RELAPSING REMITTING MULTIPLE SCLEROSIS (HCC): ICD-10-CM

## 2024-05-29 RX ORDER — BACLOFEN 20 MG/1
20 TABLET ORAL 3 TIMES DAILY PRN
Qty: 90 TABLET | Refills: 0 | Status: SHIPPED | OUTPATIENT
Start: 2024-05-29

## 2024-06-05 ENCOUNTER — HOSPITAL ENCOUNTER (OUTPATIENT)
Facility: HOSPITAL | Age: 49
Discharge: HOME | End: 2024-06-08
Payer: COMMERCIAL

## 2024-06-05 VITALS
RESPIRATION RATE: 16 BRPM | DIASTOLIC BLOOD PRESSURE: 73 MMHG | SYSTOLIC BLOOD PRESSURE: 120 MMHG | HEIGHT: 67 IN | WEIGHT: 154.6 LBS | OXYGEN SATURATION: 100 % | HEART RATE: 68 BPM | BODY MASS INDEX: 24.27 KG/M2 | TEMPERATURE: 97.8 F

## 2024-06-05 LAB
ERYTHROCYTE [DISTWIDTH] IN BLOOD BY AUTOMATED COUNT: 12.8 % (ref 11.5–14.5)
EST. AVERAGE GLUCOSE BLD GHB EST-MCNC: 131 MG/DL
HBA1C MFR BLD: 6.2 % (ref 4–5.6)
HCT VFR BLD AUTO: 43.1 % (ref 35–47)
HGB BLD-MCNC: 14.4 G/DL (ref 11.5–16)
MCH RBC QN AUTO: 30.1 PG (ref 26–34)
MCHC RBC AUTO-ENTMCNC: 33.4 G/DL (ref 30–36.5)
MCV RBC AUTO: 90 FL (ref 80–99)
NRBC # BLD: 0 K/UL (ref 0–0.01)
NRBC BLD-RTO: 0 PER 100 WBC
PLATELET # BLD AUTO: 286 K/UL (ref 150–400)
PMV BLD AUTO: 11.2 FL (ref 8.9–12.9)
RBC # BLD AUTO: 4.79 M/UL (ref 3.8–5.2)
WBC # BLD AUTO: 12.4 K/UL (ref 3.6–11)

## 2024-06-05 PROCEDURE — 83036 HEMOGLOBIN GLYCOSYLATED A1C: CPT

## 2024-06-05 PROCEDURE — 93005 ELECTROCARDIOGRAM TRACING: CPT | Performed by: ORTHOPAEDIC SURGERY

## 2024-06-05 PROCEDURE — 36415 COLL VENOUS BLD VENIPUNCTURE: CPT

## 2024-06-05 PROCEDURE — 85027 COMPLETE CBC AUTOMATED: CPT

## 2024-06-05 RX ORDER — PREDNISONE 20 MG/1
20 TABLET ORAL DAILY
COMMUNITY

## 2024-06-05 RX ORDER — FEXOFENADINE HCL 180 MG/1
180 TABLET ORAL DAILY
COMMUNITY

## 2024-06-06 ENCOUNTER — OFFICE VISIT (OUTPATIENT)
Age: 49
End: 2024-06-06
Payer: COMMERCIAL

## 2024-06-06 VITALS
WEIGHT: 153.31 LBS | DIASTOLIC BLOOD PRESSURE: 69 MMHG | BODY MASS INDEX: 24.06 KG/M2 | RESPIRATION RATE: 16 BRPM | HEART RATE: 99 BPM | SYSTOLIC BLOOD PRESSURE: 119 MMHG | TEMPERATURE: 97.3 F | HEIGHT: 67 IN | OXYGEN SATURATION: 99 %

## 2024-06-06 DIAGNOSIS — Z87.42 HISTORY OF ENDOMETRIOSIS: Primary | ICD-10-CM

## 2024-06-06 DIAGNOSIS — N94.10 DYSPAREUNIA, FEMALE: ICD-10-CM

## 2024-06-06 DIAGNOSIS — Z90.710 HISTORY OF VAGINAL HYSTERECTOMY: ICD-10-CM

## 2024-06-06 PROCEDURE — 99213 OFFICE O/P EST LOW 20 MIN: CPT | Performed by: OBSTETRICS & GYNECOLOGY

## 2024-06-06 NOTE — PROGRESS NOTES
1 tablet by mouth daily  Dispense: 3 packet; Refill: 4    2. Dyspareunia, female  Probably secondary to endometriosis.    3. History of vaginal hysterectomy         Return in about 6 months (around 12/6/2024) for Follow-up response to therapy.     Bryan Watson M.D.  [facility]  Obstetrics and Gynecology  [phone]

## 2024-06-07 LAB
EKG ATRIAL RATE: 66 BPM
EKG DIAGNOSIS: NORMAL
EKG P AXIS: 48 DEGREES
EKG P-R INTERVAL: 148 MS
EKG Q-T INTERVAL: 410 MS
EKG QRS DURATION: 128 MS
EKG QTC CALCULATION (BAZETT): 429 MS
EKG R AXIS: 82 DEGREES
EKG T AXIS: 48 DEGREES
EKG VENTRICULAR RATE: 66 BPM

## 2024-06-14 DIAGNOSIS — G35 RELAPSING REMITTING MULTIPLE SCLEROSIS (HCC): ICD-10-CM

## 2024-06-17 RX ORDER — BACLOFEN 20 MG/1
20 TABLET ORAL 3 TIMES DAILY PRN
Qty: 90 TABLET | Refills: 0 | Status: SHIPPED | OUTPATIENT
Start: 2024-06-17

## 2024-07-30 ENCOUNTER — PATIENT MESSAGE (OUTPATIENT)
Age: 49
End: 2024-07-30

## 2024-07-30 DIAGNOSIS — G35 RELAPSING REMITTING MULTIPLE SCLEROSIS (HCC): Primary | ICD-10-CM

## 2024-08-01 ENCOUNTER — TELEPHONE (OUTPATIENT)
Age: 49
End: 2024-08-01

## 2024-08-01 ENCOUNTER — OFFICE VISIT (OUTPATIENT)
Age: 49
End: 2024-08-01
Payer: COMMERCIAL

## 2024-08-01 VITALS
OXYGEN SATURATION: 98 % | SYSTOLIC BLOOD PRESSURE: 100 MMHG | RESPIRATION RATE: 18 BRPM | HEART RATE: 80 BPM | HEIGHT: 67 IN | TEMPERATURE: 98.7 F | BODY MASS INDEX: 23.81 KG/M2 | WEIGHT: 151.7 LBS | DIASTOLIC BLOOD PRESSURE: 72 MMHG

## 2024-08-01 DIAGNOSIS — Z79.4 TYPE 2 DIABETES MELLITUS WITH HYPERGLYCEMIA, WITH LONG-TERM CURRENT USE OF INSULIN (HCC): Primary | ICD-10-CM

## 2024-08-01 DIAGNOSIS — E11.65 TYPE 2 DIABETES MELLITUS WITH HYPERGLYCEMIA, WITH LONG-TERM CURRENT USE OF INSULIN (HCC): Primary | ICD-10-CM

## 2024-08-01 DIAGNOSIS — R68.89 HEAT INTOLERANCE: ICD-10-CM

## 2024-08-01 LAB — GLUCOSE, POC: 123 MG/DL

## 2024-08-01 PROCEDURE — 3044F HG A1C LEVEL LT 7.0%: CPT | Performed by: STUDENT IN AN ORGANIZED HEALTH CARE EDUCATION/TRAINING PROGRAM

## 2024-08-01 PROCEDURE — 82962 GLUCOSE BLOOD TEST: CPT | Performed by: STUDENT IN AN ORGANIZED HEALTH CARE EDUCATION/TRAINING PROGRAM

## 2024-08-01 PROCEDURE — 99203 OFFICE O/P NEW LOW 30 MIN: CPT | Performed by: STUDENT IN AN ORGANIZED HEALTH CARE EDUCATION/TRAINING PROGRAM

## 2024-08-01 RX ORDER — MELOXICAM 7.5 MG/1
TABLET ORAL DAILY PRN
COMMUNITY

## 2024-08-01 RX ORDER — BLOOD SUGAR DIAGNOSTIC
STRIP MISCELLANEOUS
COMMUNITY
Start: 2024-06-06

## 2024-08-01 RX ORDER — PEN NEEDLE, DIABETIC 32GX 5/32"
NEEDLE, DISPOSABLE MISCELLANEOUS
COMMUNITY
Start: 2024-07-31

## 2024-08-01 RX ORDER — INSULIN LISPRO 100 [IU]/ML
INJECTION, SOLUTION INTRAVENOUS; SUBCUTANEOUS
COMMUNITY
Start: 2024-07-12

## 2024-08-01 RX ORDER — ASCORBIC ACID 125 MG
TABLET,CHEWABLE ORAL
COMMUNITY

## 2024-08-01 NOTE — TELEPHONE ENCOUNTER
Marlene Yun called to advise her insurance is requiring a prior dora. [facility] in [place].

## 2024-08-01 NOTE — PROGRESS NOTES
1. \"Have you been to the ER, urgent care clinic since your last visit?  Hospitalized since your last visit?\" no    2. \"Have you seen or consulted any other health care providers outside of the [facility] since your last visit?\" Yes    3. For patients aged 45-75: Has the patient had a colonoscopy / FIT/ Cologuard? Yes      If the patient is female:    4. For patients aged 40-74: Has the patient had a mammogram within the past 2 years? yes      5. For patients aged 21-65: Has the patient had a pap smear? No, hysterectomy      /72 (Site: Left Upper Arm, Position: Sitting, Cuff Size: Medium Adult)   Pulse 80   Temp 98.7 °F (37.1 °C) (Temporal)   Resp 18   Ht 1.702 m (5' 7\")   Wt 68.8 kg (151 lb 11.2 oz)   SpO2 98%   BMI 23.76 kg/m²     BS-123

## 2024-08-01 NOTE — PROGRESS NOTES
[facility]                                                                                    Desirae Arce M.D          Patient Information  Date:2024  Name : Marlene Yun 48 y.o.     YOB: 1975         Referred by: Mohit Shafer DO       Chief Complaint   Patient presents with    New Patient    Diabetes       History of Present Illness: Marlene Yun is a 48 y.o. female with MS,  type 2 DM who presented to establish care       Type 2 diabetes mellitus    Glucometer reading:  Results for orders placed or performed in visit on 24   AMB POC GLUCOSE BLOOD, BY GLUCOSE MONITORING DEVICE   Result Value Ref Range    Glucose,  MG/DL         · Diagnosis:  · Family history of diabetes Mellitus : father, his mom,  · Current treatment: Metformin 500 mg BID  · Past treatment: None   · Glucose checks forgot Meter   · Hyperglycemia: yes   · Hypoglycemia: no   · Meals per day: 3  ---Breakfast :no   ----Lunch :no  ----Dinner: tacos-meat, cheese ,lettuce, 3 of them, water   -----Snacks: protein pudding , protein shakes   -----Drinks: water   · Exercise: none   Steroids:yes steroids for MS  · DM related hospitalizations:   No    Smokin/2 PPD   Family history of coronary artery disease/stroke:  [id number]  Complications of DM:  · CAD: no  · CVA: no  · PVD: no  · Amputations: no   · Retinopathy: 4 months ago   · Gastropathy: no  · Nephropathy: no  · Neuropathy: no   Sees podiatrist:    Medications:  · Statin: no  · ACE-I: no  · ASA: no    · Diabetes education:no     Of note patient reports heat intolerance.  Also reports weight gain   Past Medical History:   Diagnosis Date    Anxiety     Diabetes mellitus (HCC)     Fibromyalgia     Currently seeing a Neurologist to confirm diagnosis, MS, right arm pain    Ill-defined condition     Patient reports increased WBC's- has appointment with Oncologist    Migraines     MS (multiple sclerosis) (HCC)

## 2024-08-14 DIAGNOSIS — G35 RELAPSING REMITTING MULTIPLE SCLEROSIS (HCC): ICD-10-CM

## 2024-08-14 RX ORDER — PREDNISONE 10 MG/1
TABLET ORAL
Qty: 234 TABLET | Refills: 0 | Status: SHIPPED | OUTPATIENT
Start: 2024-08-14

## 2024-08-24 LAB
T4 FREE SERPL-MCNC: 1.31 NG/DL (ref 0.82–1.77)
TSH SERPL DL<=0.005 MIU/L-ACNC: 0.5 UIU/ML (ref 0.45–4.5)

## 2024-08-26 ENCOUNTER — TELEPHONE (OUTPATIENT)
Age: 49
End: 2024-08-26

## 2024-08-26 NOTE — TELEPHONE ENCOUNTER
Marlene Yun called because her insurance advised her that Ozempic would not be covered and Trulicity should be. She also stated she is on Prednisone. She would also like to know if she should continue taking Metformin.

## 2024-08-27 ENCOUNTER — APPOINTMENT (OUTPATIENT)
Facility: HOSPITAL | Age: 49
End: 2024-08-27
Payer: COMMERCIAL

## 2024-08-27 ENCOUNTER — HOSPITAL ENCOUNTER (EMERGENCY)
Facility: HOSPITAL | Age: 49
Discharge: HOME | End: 2024-08-27
Attending: EMERGENCY MEDICINE
Payer: COMMERCIAL

## 2024-08-27 VITALS
RESPIRATION RATE: 17 BRPM | DIASTOLIC BLOOD PRESSURE: 82 MMHG | WEIGHT: 153 LBS | HEART RATE: 58 BPM | BODY MASS INDEX: 24.01 KG/M2 | OXYGEN SATURATION: 99 % | HEIGHT: 67 IN | TEMPERATURE: 97.9 F | SYSTOLIC BLOOD PRESSURE: 130 MMHG

## 2024-08-27 DIAGNOSIS — R23.2 SKIN BLUSHING/FLUSHING: Primary | ICD-10-CM

## 2024-08-27 DIAGNOSIS — K08.89 DENTALGIA: ICD-10-CM

## 2024-08-27 LAB
ALBUMIN SERPL-MCNC: 3.9 G/DL (ref 3.5–5)
ALBUMIN/GLOB SERPL: 1.2 (ref 1.1–2.2)
ALP SERPL-CCNC: 55 U/L (ref 45–117)
ALT SERPL-CCNC: 22 U/L (ref 12–78)
ANION GAP SERPL CALC-SCNC: 7 MMOL/L (ref 5–15)
APPEARANCE UR: CLEAR
AST SERPL W P-5'-P-CCNC: 7 U/L (ref 15–37)
BACTERIA URNS QL MICRO: NEGATIVE /HPF
BASOPHILS # BLD: 0 K/UL (ref 0–0.1)
BASOPHILS NFR BLD: 0 % (ref 0–1)
BILIRUB SERPL-MCNC: 0.3 MG/DL (ref 0.2–1)
BILIRUB UR QL: NEGATIVE
BUN SERPL-MCNC: 14 MG/DL (ref 6–20)
BUN/CREAT SERPL: 18 (ref 12–20)
CA-I BLD-MCNC: 9.5 MG/DL (ref 8.5–10.1)
CHLORIDE SERPL-SCNC: 105 MMOL/L (ref 97–108)
CO2 SERPL-SCNC: 30 MMOL/L (ref 21–32)
COLOR UR: YELLOW
CREAT SERPL-MCNC: 0.76 MG/DL (ref 0.55–1.02)
DIFFERENTIAL METHOD BLD: ABNORMAL
EKG ATRIAL RATE: 58 BPM
EKG DIAGNOSIS: NORMAL
EKG P AXIS: 56 DEGREES
EKG P-R INTERVAL: 142 MS
EKG Q-T INTERVAL: 418 MS
EKG QRS DURATION: 128 MS
EKG QTC CALCULATION (BAZETT): 410 MS
EKG R AXIS: 77 DEGREES
EKG T AXIS: 30 DEGREES
EKG VENTRICULAR RATE: 58 BPM
EOSINOPHIL # BLD: 0.1 K/UL (ref 0–0.4)
EOSINOPHIL NFR BLD: 1 % (ref 0–7)
EPITH CASTS URNS QL MICRO: ABNORMAL /LPF
ERYTHROCYTE [DISTWIDTH] IN BLOOD BY AUTOMATED COUNT: 13 % (ref 11.5–14.5)
GLOBULIN SER CALC-MCNC: 3.3 G/DL (ref 2–4)
GLUCOSE SERPL-MCNC: 108 MG/DL (ref 65–100)
GLUCOSE UR STRIP.AUTO-MCNC: NEGATIVE MG/DL
HCT VFR BLD AUTO: 42.6 % (ref 35–47)
HGB BLD-MCNC: 14.7 G/DL (ref 11.5–16)
HGB UR QL STRIP: NEGATIVE
IMM GRANULOCYTES # BLD AUTO: 0.1 K/UL (ref 0–0.04)
IMM GRANULOCYTES NFR BLD AUTO: 0 % (ref 0–0.5)
KETONES UR QL STRIP.AUTO: NEGATIVE MG/DL
LEUKOCYTE ESTERASE UR QL STRIP.AUTO: NEGATIVE
LYMPHOCYTES # BLD: 6.4 K/UL (ref 0.8–3.5)
LYMPHOCYTES NFR BLD: 30 % (ref 12–49)
MCH RBC QN AUTO: 30.9 PG (ref 26–34)
MCHC RBC AUTO-ENTMCNC: 34.5 G/DL (ref 30–36.5)
MCV RBC AUTO: 89.5 FL (ref 80–99)
MONOCYTES # BLD: 1.2 K/UL (ref 0–1)
MONOCYTES NFR BLD: 5 % (ref 5–13)
NEUTS SEG # BLD: 13.8 K/UL (ref 1.8–8)
NEUTS SEG NFR BLD: 64 % (ref 32–75)
NITRITE UR QL STRIP.AUTO: NEGATIVE
PH UR STRIP: 6 (ref 5–8)
PLATELET # BLD AUTO: 367 K/UL (ref 150–400)
PMV BLD AUTO: 11.4 FL (ref 8.9–12.9)
POTASSIUM SERPL-SCNC: 4.1 MMOL/L (ref 3.5–5.1)
PROT SERPL-MCNC: 7.2 G/DL (ref 6.4–8.2)
PROT UR STRIP-MCNC: NEGATIVE MG/DL
RBC # BLD AUTO: 4.76 M/UL (ref 3.8–5.2)
RBC #/AREA URNS HPF: ABNORMAL /HPF (ref 0–5)
SODIUM SERPL-SCNC: 142 MMOL/L (ref 136–145)
SP GR UR REFRACTOMETRY: 1.02 (ref 1–1.03)
TROPONIN I SERPL HS-MCNC: <4 NG/L (ref 0–51)
TSH SERPL DL<=0.05 MIU/L-ACNC: 2.24 UIU/ML (ref 0.36–3.74)
UROBILINOGEN UR QL STRIP.AUTO: 0.1 EU/DL (ref 0.2–1)
WBC # BLD AUTO: 21.6 K/UL (ref 3.6–11)
WBC URNS QL MICRO: ABNORMAL /HPF (ref 0–4)

## 2024-08-27 PROCEDURE — 71045 X-RAY EXAM CHEST 1 VIEW: CPT

## 2024-08-27 PROCEDURE — 80053 COMPREHEN METABOLIC PANEL: CPT

## 2024-08-27 PROCEDURE — 84484 ASSAY OF TROPONIN QUANT: CPT

## 2024-08-27 PROCEDURE — 99285 EMERGENCY DEPT VISIT HI MDM: CPT

## 2024-08-27 PROCEDURE — 85025 COMPLETE CBC W/AUTO DIFF WBC: CPT

## 2024-08-27 PROCEDURE — 81001 URINALYSIS AUTO W/SCOPE: CPT

## 2024-08-27 PROCEDURE — 93005 ELECTROCARDIOGRAM TRACING: CPT | Performed by: EMERGENCY MEDICINE

## 2024-08-27 PROCEDURE — 84443 ASSAY THYROID STIM HORMONE: CPT

## 2024-08-27 PROCEDURE — 36415 COLL VENOUS BLD VENIPUNCTURE: CPT

## 2024-08-27 RX ORDER — HYDROXYZINE PAMOATE 25 MG/1
25-50 CAPSULE ORAL
Qty: 30 CAPSULE | Refills: 0 | Status: SHIPPED | OUTPATIENT
Start: 2024-08-27 | End: 2024-09-10

## 2024-08-27 ASSESSMENT — LIFESTYLE VARIABLES
HOW MANY STANDARD DRINKS CONTAINING ALCOHOL DO YOU HAVE ON A TYPICAL DAY: PATIENT DOES NOT DRINK
HOW OFTEN DO YOU HAVE A DRINK CONTAINING ALCOHOL: NEVER

## 2024-08-27 ASSESSMENT — PAIN - FUNCTIONAL ASSESSMENT: PAIN_FUNCTIONAL_ASSESSMENT: NONE - DENIES PAIN

## 2024-08-27 NOTE — ED PROVIDER NOTES
[facility] EMERGENCY DEPARTMENT  EMERGENCY DEPARTMENT HISTORY AND PHYSICAL EXAM      Date: 8/27/2024  Patient Name: Marlene Yun      History of Presenting Illness       Chief Complaint   Patient presents with    Pharyngitis    Hot Flashes       History was provided by: Patient    Location/Duration/Severity/Modifying factors     Marlene Yun is a 48 y.o. female who arrived to the emergency department by by private vehicle with complaints of Pharyngitis and Hot Flashes        Patient is a 48-year-old female who is presenting to the emergency department with a chief complaint of cutaneous flushing which she says starts from approximately the sternal area up to her head, occasionally experiences in her legs and involves both arms.  The flushing comes on and then she becomes diaphoretic.  She denies any chest pain but says she does feel little bit short of breath, starting in the epigastric region.  She denies any fevers.  She is checked her temperature and blood pressure during episodes and has not noticed any increased blood pressure or temperature.  She saw her endocrinologist about a week ago for checkup and mentioned it at her visit, they checked a thyroid panel which she says was normal.  She also mentions that she feels burning sensation in her mouth.  She says that the episodes seem to come on spontaneously initially was once or twice a day and now seems to be happening every 2 hours and she is keeping her from sleeping because he feels so hot although sometimes she can put a fan on herself and that seems to help.  Currently doing a course of prednisone for her MS flare, at 90 mg/day.          There are no other complaints, changes, or physical findings at this time.    PCP: Mohit Shafer, DO    No current facility-administered medications for this encounter.     Current Outpatient Medications   Medication Sig Dispense Refill    amoxicillin-clavulanate (AUGMENTIN) 875-125 MG per tablet Take 1 tablet by mouth 2

## 2024-08-27 NOTE — ED NOTES
Went to round on patient and she requested to have her IV removed due to it hurting her arm. Educated pt that if we need to use an IV for any reason later, that a new one would have to be inserted. Pt agreed to have a new IV inserted if needed and previous IV was removed.

## 2024-08-27 NOTE — ED TRIAGE NOTES
For 4 weeks pt has had an intermittent warm sensation from the chest up and becomes diaphoretic, states it's affecting her a lot at night to where she cannot sleep. Pt recently put on prednisone for MS, started it 2 weeks ago, but for 4 days her lips and mouth have felt raw and hot like they're sunburnt, this morning her throat is sore and raw.

## 2024-08-27 NOTE — DISCHARGE INSTRUCTIONS
1.  Follow-up with your endocrinologist and your neurologist as well.  You likely need more testing for this if the symptoms persist.  Your results today were generally reassuring but your white blood cell count was elevated which I think is most likely due to the steroid use.  It looks like you have a typically elevated white blood cell count although this is more than usual, high-dose steroids can certainly cause it to be elevated.    If you develop a fever or chills, chest pain, shortness of breath or any other symptoms, please return.  Contact your neurologist.    Return to the emergency room if you develop worsening symptoms or any other symptoms that concern you.  You can try the Vistaril/hydroxyzine at night as needed for sleep, itching or flushing.  You can take it during the daytime but it may cause drowsiness.        Thank you for choosing our Emergency Department for your care.  It is our privilege to care for you in your time of need.  In the next several days, you may receive a survey via email or mailed to your home about your experience with our team.  We would greatly appreciate you taking a few minutes to complete the survey, as we use this information to learn what we have done well and what we could be doing better. Thank you for trusting us with your care!    Below you will find a list of your tests from today's visit.   Labs  Recent Results (from the past 12 hour(s))   CBC with Auto Differential    Collection Time: 08/27/24  8:45 AM   Result Value Ref Range    WBC 21.6 (H) 3.6 - 11.0 K/uL    RBC 4.76 3.80 - 5.20 M/uL    Hemoglobin 14.7 11.5 - 16.0 g/dL    Hematocrit 42.6 35.0 - 47.0 %    MCV 89.5 80.0 - 99.0 FL    MCH 30.9 26.0 - 34.0 PG    MCHC 34.5 30.0 - 36.5 g/dL    RDW 13.0 11.5 - 14.5 %    Platelets 367 150 - 400 K/uL    MPV 11.4 8.9 - 12.9 FL    Neutrophils % 64 32 - 75 %    Lymphocytes % 30 12 - 49 %    Monocytes % 5 5 - 13 %    Eosinophils % 1 0 - 7 %    Basophils % 0 0 - 1 %

## 2024-08-28 RX ORDER — DULAGLUTIDE 0.75 MG/.5ML
INJECTION, SOLUTION SUBCUTANEOUS
Qty: 12 ADJUSTABLE DOSE PRE-FILLED PEN SYRINGE | Refills: 2 | Status: SHIPPED | OUTPATIENT
Start: 2024-08-28

## 2024-08-29 ENCOUNTER — HOSPITAL ENCOUNTER (OUTPATIENT)
Facility: HOSPITAL | Age: 49
Discharge: HOME | End: 2024-08-29
Payer: COMMERCIAL

## 2024-08-29 ENCOUNTER — HOSPITAL ENCOUNTER (OUTPATIENT)
Facility: HOSPITAL | Age: 49
End: 2024-08-29
Payer: COMMERCIAL

## 2024-08-29 DIAGNOSIS — G35 RELAPSING REMITTING MULTIPLE SCLEROSIS (HCC): ICD-10-CM

## 2024-08-29 PROCEDURE — A9577 INJ MULTIHANCE: HCPCS | Performed by: NURSE PRACTITIONER

## 2024-08-29 PROCEDURE — 6360000004 HC RX CONTRAST MEDICATION: Performed by: NURSE PRACTITIONER

## 2024-08-29 PROCEDURE — 72156 MRI NECK SPINE W/O & W/DYE: CPT

## 2024-08-29 PROCEDURE — 70553 MRI BRAIN STEM W/O & W/DYE: CPT

## 2024-08-29 RX ADMIN — GADOBENATE DIMEGLUMINE 14 ML: 529 INJECTION, SOLUTION INTRAVENOUS at 10:27

## 2024-09-05 ENCOUNTER — HOSPITAL ENCOUNTER (OUTPATIENT)
Facility: HOSPITAL | Age: 49
Discharge: HOME | End: 2024-09-08
Payer: COMMERCIAL

## 2024-09-05 DIAGNOSIS — G35 RELAPSING REMITTING MULTIPLE SCLEROSIS (HCC): ICD-10-CM

## 2024-09-05 PROCEDURE — 6360000004 HC RX CONTRAST MEDICATION: Performed by: NURSE PRACTITIONER

## 2024-09-05 PROCEDURE — 72157 MRI CHEST SPINE W/O & W/DYE: CPT

## 2024-09-05 PROCEDURE — A9577 INJ MULTIHANCE: HCPCS | Performed by: NURSE PRACTITIONER

## 2024-09-05 RX ADMIN — GADOBENATE DIMEGLUMINE 14 ML: 529 INJECTION, SOLUTION INTRAVENOUS at 08:53

## 2024-09-11 ENCOUNTER — HOSPITAL ENCOUNTER (OUTPATIENT)
Facility: HOSPITAL | Age: 49
Setting detail: INFUSION SERIES
Discharge: HOME | End: 2024-09-11
Payer: COMMERCIAL

## 2024-09-11 ENCOUNTER — OFFICE VISIT (OUTPATIENT)
Age: 49
End: 2024-09-11
Payer: COMMERCIAL

## 2024-09-11 VITALS
HEART RATE: 79 BPM | DIASTOLIC BLOOD PRESSURE: 78 MMHG | RESPIRATION RATE: 17 BRPM | SYSTOLIC BLOOD PRESSURE: 114 MMHG | OXYGEN SATURATION: 97 %

## 2024-09-11 VITALS
DIASTOLIC BLOOD PRESSURE: 77 MMHG | RESPIRATION RATE: 20 BRPM | SYSTOLIC BLOOD PRESSURE: 114 MMHG | HEART RATE: 78 BPM | TEMPERATURE: 98 F

## 2024-09-11 DIAGNOSIS — G35 RELAPSING REMITTING MULTIPLE SCLEROSIS (HCC): Primary | ICD-10-CM

## 2024-09-11 DIAGNOSIS — G35 EXACERBATION OF MULTIPLE SCLEROSIS (HCC): ICD-10-CM

## 2024-09-11 LAB
GLUCOSE BLD STRIP.AUTO-MCNC: 140 MG/DL (ref 65–100)
PERFORMED BY:: ABNORMAL

## 2024-09-11 PROCEDURE — 99214 OFFICE O/P EST MOD 30 MIN: CPT | Performed by: NURSE PRACTITIONER

## 2024-09-11 PROCEDURE — 2580000003 HC RX 258: Performed by: NURSE PRACTITIONER

## 2024-09-11 PROCEDURE — 96365 THER/PROPH/DIAG IV INF INIT: CPT

## 2024-09-11 PROCEDURE — 82962 GLUCOSE BLOOD TEST: CPT

## 2024-09-11 PROCEDURE — 6360000002 HC RX W HCPCS: Performed by: NURSE PRACTITIONER

## 2024-09-11 RX ORDER — SODIUM CHLORIDE 9 MG/ML
INJECTION, SOLUTION INTRAVENOUS CONTINUOUS
Status: DISCONTINUED | OUTPATIENT
Start: 2024-09-11 | End: 2024-09-12 | Stop reason: HOSPADM

## 2024-09-11 RX ADMIN — SODIUM CHLORIDE: 9 INJECTION, SOLUTION INTRAVENOUS at 13:44

## 2024-09-11 RX ADMIN — METHYLPREDNISOLONE SODIUM SUCCINATE 1000 MG: 1 INJECTION INTRAMUSCULAR; INTRAVENOUS at 14:02

## 2024-09-11 ASSESSMENT — PAIN SCALES - GENERAL
PAINLEVEL_OUTOF10: 0
PAINLEVEL_OUTOF10: 0

## 2024-09-12 ENCOUNTER — HOSPITAL ENCOUNTER (OUTPATIENT)
Facility: HOSPITAL | Age: 49
Setting detail: INFUSION SERIES
Discharge: HOME | End: 2024-09-12
Payer: COMMERCIAL

## 2024-09-12 VITALS
DIASTOLIC BLOOD PRESSURE: 78 MMHG | WEIGHT: 152 LBS | HEART RATE: 84 BPM | RESPIRATION RATE: 18 BRPM | HEIGHT: 67 IN | BODY MASS INDEX: 23.86 KG/M2 | TEMPERATURE: 97.9 F | SYSTOLIC BLOOD PRESSURE: 129 MMHG

## 2024-09-12 LAB
GLUCOSE BLD STRIP.AUTO-MCNC: 156 MG/DL (ref 65–100)
GLUCOSE BLD STRIP.AUTO-MCNC: 167 MG/DL (ref 65–100)
PERFORMED BY:: ABNORMAL
PERFORMED BY:: ABNORMAL

## 2024-09-12 PROCEDURE — 6360000002 HC RX W HCPCS: Performed by: NURSE PRACTITIONER

## 2024-09-12 PROCEDURE — 96365 THER/PROPH/DIAG IV INF INIT: CPT

## 2024-09-12 PROCEDURE — 82962 GLUCOSE BLOOD TEST: CPT

## 2024-09-12 PROCEDURE — 2580000003 HC RX 258: Performed by: NURSE PRACTITIONER

## 2024-09-12 RX ADMIN — METHYLPREDNISOLONE SODIUM SUCCINATE 1000 MG: 1 INJECTION INTRAMUSCULAR; INTRAVENOUS at 08:46

## 2024-09-13 ENCOUNTER — HOSPITAL ENCOUNTER (OUTPATIENT)
Facility: HOSPITAL | Age: 49
Setting detail: INFUSION SERIES
Discharge: HOME | End: 2024-09-13
Payer: COMMERCIAL

## 2024-09-13 VITALS
OXYGEN SATURATION: 98 % | TEMPERATURE: 98 F | SYSTOLIC BLOOD PRESSURE: 114 MMHG | DIASTOLIC BLOOD PRESSURE: 78 MMHG | HEART RATE: 70 BPM | RESPIRATION RATE: 17 BRPM

## 2024-09-13 PROCEDURE — 2580000003 HC RX 258: Performed by: NURSE PRACTITIONER

## 2024-09-13 PROCEDURE — 6360000002 HC RX W HCPCS: Performed by: NURSE PRACTITIONER

## 2024-09-13 PROCEDURE — 96365 THER/PROPH/DIAG IV INF INIT: CPT

## 2024-09-13 RX ADMIN — METHYLPREDNISOLONE SODIUM SUCCINATE 1000 MG: 1 INJECTION INTRAMUSCULAR; INTRAVENOUS at 08:47

## 2024-09-17 ENCOUNTER — HOSPITAL ENCOUNTER (EMERGENCY)
Facility: HOSPITAL | Age: 49
Discharge: HOME | End: 2024-09-17
Payer: COMMERCIAL

## 2024-09-17 ENCOUNTER — APPOINTMENT (OUTPATIENT)
Facility: HOSPITAL | Age: 49
End: 2024-09-17
Payer: COMMERCIAL

## 2024-09-17 VITALS
SYSTOLIC BLOOD PRESSURE: 112 MMHG | TEMPERATURE: 98.4 F | HEIGHT: 67 IN | HEART RATE: 74 BPM | BODY MASS INDEX: 24.01 KG/M2 | RESPIRATION RATE: 16 BRPM | DIASTOLIC BLOOD PRESSURE: 78 MMHG | WEIGHT: 153 LBS | OXYGEN SATURATION: 99 %

## 2024-09-17 DIAGNOSIS — R10.31 RIGHT GROIN PAIN: Primary | ICD-10-CM

## 2024-09-17 LAB
ALBUMIN SERPL-MCNC: 3.4 G/DL (ref 3.5–5)
ALBUMIN/GLOB SERPL: 1.1 (ref 1.1–2.2)
ALP SERPL-CCNC: 56 U/L (ref 45–117)
ALT SERPL-CCNC: 32 U/L (ref 12–78)
ANION GAP SERPL CALC-SCNC: 4 MMOL/L (ref 2–12)
APPEARANCE UR: ABNORMAL
AST SERPL W P-5'-P-CCNC: 11 U/L (ref 15–37)
BACTERIA URNS QL MICRO: ABNORMAL /HPF
BASOPHILS # BLD: 0 K/UL (ref 0–0.1)
BASOPHILS NFR BLD: 0 % (ref 0–1)
BILIRUB SERPL-MCNC: 0.4 MG/DL (ref 0.2–1)
BILIRUB UR QL: NEGATIVE
BUN SERPL-MCNC: 11 MG/DL (ref 6–20)
BUN/CREAT SERPL: 15 (ref 12–20)
CA-I BLD-MCNC: 9.1 MG/DL (ref 8.5–10.1)
CHLORIDE SERPL-SCNC: 106 MMOL/L (ref 97–108)
CO2 SERPL-SCNC: 29 MMOL/L (ref 21–32)
COLOR UR: ABNORMAL
CREAT SERPL-MCNC: 0.75 MG/DL (ref 0.55–1.02)
DIFFERENTIAL METHOD BLD: ABNORMAL
EKG ATRIAL RATE: 79 BPM
EKG DIAGNOSIS: NORMAL
EKG P AXIS: 57 DEGREES
EKG P-R INTERVAL: 138 MS
EKG Q-T INTERVAL: 370 MS
EKG QRS DURATION: 130 MS
EKG QTC CALCULATION (BAZETT): 424 MS
EKG R AXIS: 81 DEGREES
EKG T AXIS: 35 DEGREES
EKG VENTRICULAR RATE: 79 BPM
EOSINOPHIL # BLD: 0.2 K/UL (ref 0–0.4)
EOSINOPHIL NFR BLD: 1 % (ref 0–7)
EPITH CASTS URNS QL MICRO: ABNORMAL /LPF
ERYTHROCYTE [DISTWIDTH] IN BLOOD BY AUTOMATED COUNT: 12.7 % (ref 11.5–14.5)
GLOBULIN SER CALC-MCNC: 3.2 G/DL (ref 2–4)
GLUCOSE SERPL-MCNC: 156 MG/DL (ref 65–100)
GLUCOSE UR STRIP.AUTO-MCNC: NEGATIVE MG/DL
HCT VFR BLD AUTO: 46.3 % (ref 35–47)
HGB BLD-MCNC: 15.5 G/DL (ref 11.5–16)
HGB UR QL STRIP: ABNORMAL
IMM GRANULOCYTES # BLD AUTO: 0.1 K/UL (ref 0–0.04)
IMM GRANULOCYTES NFR BLD AUTO: 1 % (ref 0–0.5)
KETONES UR QL STRIP.AUTO: NEGATIVE MG/DL
LEUKOCYTE ESTERASE UR QL STRIP.AUTO: ABNORMAL
LIPASE SERPL-CCNC: 64 U/L (ref 13–75)
LYMPHOCYTES # BLD: 3.1 K/UL (ref 0.8–3.5)
LYMPHOCYTES NFR BLD: 18 % (ref 12–49)
MCH RBC QN AUTO: 29.9 PG (ref 26–34)
MCHC RBC AUTO-ENTMCNC: 33.5 G/DL (ref 30–36.5)
MCV RBC AUTO: 89.2 FL (ref 80–99)
MONOCYTES # BLD: 0.6 K/UL (ref 0–1)
MONOCYTES NFR BLD: 4 % (ref 5–13)
MUCOUS THREADS URNS QL MICRO: ABNORMAL /LPF
NEUTS SEG # BLD: 13.5 K/UL (ref 1.8–8)
NEUTS SEG NFR BLD: 76 % (ref 32–75)
NITRITE UR QL STRIP.AUTO: NEGATIVE
NRBC # BLD: 0 K/UL (ref 0–0.01)
NRBC BLD-RTO: 0 PER 100 WBC
PH UR STRIP: 6 (ref 5–8)
PLATELET # BLD AUTO: 307 K/UL (ref 150–400)
PMV BLD AUTO: 10.7 FL (ref 8.9–12.9)
POTASSIUM SERPL-SCNC: 3.5 MMOL/L (ref 3.5–5.1)
PROT SERPL-MCNC: 6.6 G/DL (ref 6.4–8.2)
PROT UR STRIP-MCNC: NEGATIVE MG/DL
RBC # BLD AUTO: 5.19 M/UL (ref 3.8–5.2)
RBC #/AREA URNS HPF: ABNORMAL /HPF (ref 0–5)
SODIUM SERPL-SCNC: 139 MMOL/L (ref 136–145)
SP GR UR REFRACTOMETRY: 1.01 (ref 1–1.03)
URINE CULTURE IF INDICATED: ABNORMAL
UROBILINOGEN UR QL STRIP.AUTO: 0.1 EU/DL (ref 0.1–1)
WBC # BLD AUTO: 17.6 K/UL (ref 3.6–11)
WBC URNS QL MICRO: ABNORMAL /HPF (ref 0–4)

## 2024-09-17 PROCEDURE — 80053 COMPREHEN METABOLIC PANEL: CPT

## 2024-09-17 PROCEDURE — 93005 ELECTROCARDIOGRAM TRACING: CPT

## 2024-09-17 PROCEDURE — 83690 ASSAY OF LIPASE: CPT

## 2024-09-17 PROCEDURE — 87186 SC STD MICRODIL/AGAR DIL: CPT

## 2024-09-17 PROCEDURE — 85025 COMPLETE CBC W/AUTO DIFF WBC: CPT

## 2024-09-17 PROCEDURE — 99285 EMERGENCY DEPT VISIT HI MDM: CPT

## 2024-09-17 PROCEDURE — 6360000004 HC RX CONTRAST MEDICATION

## 2024-09-17 PROCEDURE — 87088 URINE BACTERIA CULTURE: CPT

## 2024-09-17 PROCEDURE — 74177 CT ABD & PELVIS W/CONTRAST: CPT

## 2024-09-17 PROCEDURE — 87086 URINE CULTURE/COLONY COUNT: CPT

## 2024-09-17 PROCEDURE — 6360000002 HC RX W HCPCS

## 2024-09-17 PROCEDURE — 36415 COLL VENOUS BLD VENIPUNCTURE: CPT

## 2024-09-17 PROCEDURE — 81001 URINALYSIS AUTO W/SCOPE: CPT

## 2024-09-17 PROCEDURE — 96374 THER/PROPH/DIAG INJ IV PUSH: CPT

## 2024-09-17 RX ORDER — IOPAMIDOL 755 MG/ML
100 INJECTION, SOLUTION INTRAVASCULAR
Status: COMPLETED | OUTPATIENT
Start: 2024-09-17 | End: 2024-09-17

## 2024-09-17 RX ORDER — KETOROLAC TROMETHAMINE 15 MG/ML
15 INJECTION, SOLUTION INTRAMUSCULAR; INTRAVENOUS
Status: COMPLETED | OUTPATIENT
Start: 2024-09-17 | End: 2024-09-17

## 2024-09-17 RX ORDER — NITROFURANTOIN 25; 75 MG/1; MG/1
100 CAPSULE ORAL 2 TIMES DAILY
Qty: 10 CAPSULE | Refills: 0 | Status: SHIPPED | OUTPATIENT
Start: 2024-09-17 | End: 2024-09-22

## 2024-09-17 RX ADMIN — IOPAMIDOL 100 ML: 755 INJECTION, SOLUTION INTRAVENOUS at 10:13

## 2024-09-17 RX ADMIN — KETOROLAC TROMETHAMINE 15 MG: 15 INJECTION, SOLUTION INTRAMUSCULAR; INTRAVENOUS at 10:35

## 2024-09-17 ASSESSMENT — PAIN SCALES - GENERAL: PAINLEVEL_OUTOF10: 8

## 2024-09-17 ASSESSMENT — PAIN - FUNCTIONAL ASSESSMENT
PAIN_FUNCTIONAL_ASSESSMENT: NONE - DENIES PAIN
PAIN_FUNCTIONAL_ASSESSMENT: 0-10

## 2024-09-19 LAB
BACTERIA SPEC CULT: ABNORMAL
COLONY COUNT, CNT: ABNORMAL
Lab: ABNORMAL

## 2024-09-19 RX ORDER — CIPROFLOXACIN 500 MG/1
500 TABLET, FILM COATED ORAL 2 TIMES DAILY
Qty: 14 TABLET | Refills: 0 | Status: SHIPPED | OUTPATIENT
Start: 2024-09-19 | End: 2024-09-26

## 2024-10-10 DIAGNOSIS — G35 RELAPSING REMITTING MULTIPLE SCLEROSIS (HCC): ICD-10-CM

## 2024-10-14 DIAGNOSIS — G35 RELAPSING REMITTING MULTIPLE SCLEROSIS (HCC): ICD-10-CM

## 2024-10-15 RX ORDER — BACLOFEN 20 MG/1
20 TABLET ORAL 3 TIMES DAILY
Qty: 90 TABLET | Refills: 1 | Status: SHIPPED | OUTPATIENT
Start: 2024-10-15

## 2024-10-23 ENCOUNTER — TELEMEDICINE (OUTPATIENT)
Age: 49
End: 2024-10-23
Payer: COMMERCIAL

## 2024-10-23 ENCOUNTER — PATIENT MESSAGE (OUTPATIENT)
Age: 49
End: 2024-10-23

## 2024-10-23 DIAGNOSIS — R26.9 ABNORMALITY OF GAIT AND MOBILITY: ICD-10-CM

## 2024-10-23 DIAGNOSIS — R53.1 WEAKNESS: ICD-10-CM

## 2024-10-23 DIAGNOSIS — G35 RELAPSING REMITTING MULTIPLE SCLEROSIS (HCC): ICD-10-CM

## 2024-10-23 DIAGNOSIS — G35 RELAPSING REMITTING MULTIPLE SCLEROSIS (HCC): Primary | ICD-10-CM

## 2024-10-23 PROCEDURE — 99214 OFFICE O/P EST MOD 30 MIN: CPT | Performed by: NURSE PRACTITIONER

## 2024-10-23 NOTE — PROGRESS NOTES
data to display                 General:  Well defined, nourished, and groomed individual in no acute distress.    Psych:  Good mood and bright affect    NEUROLOGICAL EXAMINATION:     Mental Status:   Alert and oriented to person, place, and time with recent and remote memory intact.  Attention span and concentration are normal. Speech is fluent with a full fund of knowledge.      Cranial Nerves:  I: smell Not tested   II: visual fields Not assessed   II: pupils Equal, round, reactive to light   II: optic disc Not assessed   III,VII: ptosis none   III,IV,VI: extraocular muscles  Full ROM   V: mastication normal   V: facial light touch sensation  Not assessed   VII: facial muscle function   symmetric   VIII: hearing symmetric   IX: soft palate elevation  normal   XI: trapezius strength  Not assessed   XI: sternocleidomastoid strength Not assessed   XI: neck flexion strength  Not assessed   XII: tongue  midline     Motor Examination: Normal tone and bulk.  Strength was not assessed      Sensory exam:  Not assessed     Coordination:  No resting or intention tremor    Gait and Station:  No muscle wasting or fasiculations noted.      Reflexes:  Not assessed    Assessment & Plan:      Diagnosis Orders   1. Relapsing remitting multiple sclerosis (HCC)  External Referral To Physical Therapy      2. Abnormality of gait and mobility  External Referral To Physical Therapy      3. Weakness  External Referral To Physical Therapy        RRMS continues to be associated with balance and mobility issues and weakness. This seems a bit worse since her recent exacerbation despite treatment with the IV steroids and may have been further worsened by the UTI. She plans to start the Kesimpta now. I would like to send her to PT to help with the mobility and weakness. She was referred to [facility] in [place]. We will plan to get a new baseline MRI at four months after starting the Kesimpta. I would like to see her about a month after

## 2024-10-24 RX ORDER — OFATUMUMAB 20 MG/.4ML
INJECTION, SOLUTION SUBCUTANEOUS
Qty: 4 ADJUSTABLE DOSE PRE-FILLED PEN SYRINGE | Refills: 0 | Status: ACTIVE | OUTPATIENT
Start: 2024-10-24

## 2024-10-29 DIAGNOSIS — G35 RELAPSING REMITTING MULTIPLE SCLEROSIS (HCC): ICD-10-CM

## 2024-10-29 RX ORDER — OFATUMUMAB 20 MG/.4ML
INJECTION, SOLUTION SUBCUTANEOUS
Qty: 4 ADJUSTABLE DOSE PRE-FILLED PEN SYRINGE | Refills: 0 | Status: ACTIVE | OUTPATIENT
Start: 2024-10-29 | End: 2024-10-29 | Stop reason: SDUPTHER

## 2024-10-29 RX ORDER — OFATUMUMAB 20 MG/.4ML
INJECTION, SOLUTION SUBCUTANEOUS
Qty: 4 ADJUSTABLE DOSE PRE-FILLED PEN SYRINGE | Refills: 0 | Status: SHIPPED | OUTPATIENT
Start: 2024-10-29

## 2024-11-01 ENCOUNTER — HOSPITAL ENCOUNTER (EMERGENCY)
Facility: HOSPITAL | Age: 49
Discharge: LEFT BEFORE DISCHARGE | End: 2024-11-01
Payer: COMMERCIAL

## 2024-11-01 VITALS
SYSTOLIC BLOOD PRESSURE: 111 MMHG | DIASTOLIC BLOOD PRESSURE: 74 MMHG | HEART RATE: 76 BPM | BODY MASS INDEX: 23.86 KG/M2 | RESPIRATION RATE: 14 BRPM | HEIGHT: 67 IN | WEIGHT: 152 LBS | TEMPERATURE: 98.2 F | OXYGEN SATURATION: 100 %

## 2024-11-01 DIAGNOSIS — B96.89 BACTERIAL VAGINOSIS: ICD-10-CM

## 2024-11-01 DIAGNOSIS — R30.0 DYSURIA: Primary | ICD-10-CM

## 2024-11-01 DIAGNOSIS — N76.0 BACTERIAL VAGINOSIS: ICD-10-CM

## 2024-11-01 LAB
APPEARANCE UR: CLEAR
BACTERIA URNS QL MICRO: ABNORMAL /HPF
BILIRUB UR QL: NEGATIVE
CLUE CELLS VAG QL WET PREP: ABNORMAL
COLOR UR: ABNORMAL
EPITH CASTS URNS QL MICRO: ABNORMAL /LPF
GLUCOSE UR STRIP.AUTO-MCNC: NEGATIVE MG/DL
HGB UR QL STRIP: NEGATIVE
KETONES UR QL STRIP.AUTO: NEGATIVE MG/DL
LEUKOCYTE ESTERASE UR QL STRIP.AUTO: NEGATIVE
NITRITE UR QL STRIP.AUTO: NEGATIVE
PH UR STRIP: 5 (ref 5–8)
PROT UR STRIP-MCNC: NEGATIVE MG/DL
RBC #/AREA URNS HPF: ABNORMAL /HPF (ref 0–5)
SP GR UR REFRACTOMETRY: 1.02 (ref 1–1.03)
T VAGINALIS VAG QL WET PREP: ABNORMAL
URINE CULTURE IF INDICATED: ABNORMAL
UROBILINOGEN UR QL STRIP.AUTO: 0.1 EU/DL (ref 0.2–1)
WBC URNS QL MICRO: ABNORMAL /HPF (ref 0–4)
YEAST: ABNORMAL

## 2024-11-01 PROCEDURE — 87210 SMEAR WET MOUNT SALINE/INK: CPT

## 2024-11-01 PROCEDURE — 99283 EMERGENCY DEPT VISIT LOW MDM: CPT

## 2024-11-01 PROCEDURE — 81001 URINALYSIS AUTO W/SCOPE: CPT

## 2024-11-01 RX ORDER — METRONIDAZOLE 500 MG/1
500 TABLET ORAL 2 TIMES DAILY
Qty: 14 TABLET | Refills: 0 | Status: SHIPPED | OUTPATIENT
Start: 2024-11-01 | End: 2024-11-08

## 2024-11-01 ASSESSMENT — PAIN - FUNCTIONAL ASSESSMENT: PAIN_FUNCTIONAL_ASSESSMENT: NONE - DENIES PAIN

## 2024-11-01 NOTE — ED PROVIDER NOTES
[facility] EMERGENCY DEPT  EMERGENCY DEPARTMENT HISTORY AND PHYSICAL EXAM      Date: 11/1/2024  Patient Name: Marlene Yun  MRN: [id number]  YOB: 1975  Date of evaluation: 11/1/2024  Provider: POLINA Arredondo NP   Note Started: 12:12 PM EDT 11/1/24    HISTORY OF PRESENT ILLNESS     Chief Complaint   Patient presents with    Dysuria       History Provided By: Patient    HPI: Marlene Yun is a 49 y.o. female with past medical history as listed below presents to the ER for dysuria.  Patient is having dysuria for the past few days.  Patient comes in for evaluation.    PAST MEDICAL HISTORY   Past Medical History:  Past Medical History:   Diagnosis Date    Anxiety     Diabetes mellitus (HCC)     Fibromyalgia     Currently seeing a Neurologist to confirm diagnosis, MS, right arm pain    Ill-defined condition     Patient reports increased WBC's- has appointment with Oncologist    Migraines     MS (multiple sclerosis) (HCC)        Past Surgical History:  Past Surgical History:   Procedure Laterality Date    CHOLECYSTECTOMY      FOOT SURGERY  June 2022    4th toe    GYN      hysterectomy    ORTHOPEDIC SURGERY      toe, bilateral big toes    OVARY REMOVAL Left     january 2022    PARTIAL HYSTERECTOMY (CERVIX NOT REMOVED)      TUBAL LIGATION      WISDOM TOOTH EXTRACTION Bilateral     Bottoms only       Family History:  Family History   Problem Relation Age of Onset    Prostate Cancer Paternal Grandfather     Coronary Art Dis Paternal Grandfather     Diabetes Paternal Grandfather     Diabetes Maternal Grandfather     Coronary Art Dis Maternal Grandfather     Stomach Cancer Maternal Grandfather     Cancer Maternal Grandfather     Breast Cancer Maternal Aunt     Cancer Maternal Aunt     Lung Cancer Other     Diabetes Other     Heart Disease Other         MI    Coronary Art Dis Other     Lupus Mother     Diabetes Father     Coronary Art Dis Father        Social History:  Social History     Tobacco Use    Smoking  Range    Color, UA Yellow/Straw      Appearance Clear Clear      Specific Gravity, UA 1.020 1.003 - 1.030      pH, Urine 5.0 5.0 - 8.0      Protein, UA Negative Negative mg/dL    Glucose, Ur Negative Negative mg/dL    Ketones, Urine Negative Negative mg/dL    Bilirubin, Urine Negative Negative      Blood, Urine Negative Negative      Urobilinogen, Urine 0.1 (L) 0.2 - 1.0 EU/dL    Nitrite, Urine Negative Negative      Leukocyte Esterase, Urine Negative Negative      WBC, UA 0-4 0 - 4 /hpf    RBC, UA 0-5 0 - 5 /hpf    Epithelial Cells, UA Moderate (A) Few /lpf    BACTERIA, URINE 2+ (A) Negative /hpf    Urine Culture if Indicated Culture not indicated by UA result Culture not indicated by UA result     Wet prep, genital    Collection Time: 11/01/24 10:15 AM    Specimen: Miscellaneous sample   Result Value Ref Range    Clue Cells, Wet Prep Clue Cells present (A) NONE SEEN      Trich, Wet Prep NONE SEEN NONE SEEN      Yeast, UA NONE SEEN NONE SEEN         EKG: {EKG smartlist:53778}    Radiologic Studies:  Non-plain film images such as CT, Ultrasound and MRI are read by the radiologist. Plain radiographic images are visualized and preliminarily interpreted by the ED Physician with the following findings: {Wet Read interpretation:71447}    Interpretation per the Radiologist below, if available at the time of this note:  No orders to display        ED COURSE and DIFFERENTIAL DIAGNOSIS/MDM   12:12 PM Differential and Considerations: ***    Records Reviewed (source and summary of external notes): Prior medical records and Nursing notes    Vitals:    Vitals:    11/01/24 0924   BP: 111/74   Pulse: 76   Resp: 14   Temp: 98.2 °F (36.8 °C)   TempSrc: Oral   SpO2: 100%   Weight: 68.9 kg (152 lb)   Height: 1.702 m (5' 7\")        ED COURSE       SEPSIS Reassessment: {Sepsis reassessment smartlist:39875}    Clinical Management Tools:  {CMT List:69307::\"Not Applicable\"}    Patient was given the following medications:  Medications - No

## 2024-11-14 ENCOUNTER — OFFICE VISIT (OUTPATIENT)
Age: 49
End: 2024-11-14
Payer: COMMERCIAL

## 2024-11-14 VITALS
DIASTOLIC BLOOD PRESSURE: 72 MMHG | TEMPERATURE: 98.1 F | SYSTOLIC BLOOD PRESSURE: 102 MMHG | RESPIRATION RATE: 16 BRPM | OXYGEN SATURATION: 98 % | WEIGHT: 153.5 LBS | HEART RATE: 80 BPM | HEIGHT: 67 IN | BODY MASS INDEX: 24.09 KG/M2

## 2024-11-14 DIAGNOSIS — E11.65 TYPE 2 DIABETES MELLITUS WITH HYPERGLYCEMIA, WITH LONG-TERM CURRENT USE OF INSULIN (HCC): Primary | ICD-10-CM

## 2024-11-14 DIAGNOSIS — Z79.4 TYPE 2 DIABETES MELLITUS WITH HYPERGLYCEMIA, WITH LONG-TERM CURRENT USE OF INSULIN (HCC): Primary | ICD-10-CM

## 2024-11-14 DIAGNOSIS — E78.2 MIXED HYPERLIPIDEMIA: ICD-10-CM

## 2024-11-14 LAB
GLUCOSE, POC: 98 MG/DL
HBA1C MFR BLD: 5.7 %

## 2024-11-14 PROCEDURE — 82962 GLUCOSE BLOOD TEST: CPT | Performed by: STUDENT IN AN ORGANIZED HEALTH CARE EDUCATION/TRAINING PROGRAM

## 2024-11-14 PROCEDURE — 3044F HG A1C LEVEL LT 7.0%: CPT | Performed by: STUDENT IN AN ORGANIZED HEALTH CARE EDUCATION/TRAINING PROGRAM

## 2024-11-14 PROCEDURE — 99213 OFFICE O/P EST LOW 20 MIN: CPT | Performed by: STUDENT IN AN ORGANIZED HEALTH CARE EDUCATION/TRAINING PROGRAM

## 2024-11-14 PROCEDURE — 83036 HEMOGLOBIN GLYCOSYLATED A1C: CPT | Performed by: STUDENT IN AN ORGANIZED HEALTH CARE EDUCATION/TRAINING PROGRAM

## 2024-11-14 RX ORDER — TENAPANOR HYDROCHLORIDE 53.2 MG/1
1 TABLET ORAL 2 TIMES DAILY
COMMUNITY
Start: 2024-11-04

## 2024-11-14 RX ORDER — SODIUM FLUORIDE 6 MG/ML
PASTE, DENTIFRICE DENTAL
COMMUNITY
Start: 2024-09-11

## 2024-11-14 NOTE — PROGRESS NOTES
\"Have you been to the ER, urgent care clinic since your last visit?  Hospitalized since your last visit?\"    YES - When: approximately 2  weeks ago.  Where and Why: UTI.    “Have you seen or consulted any other health care providers outside our system since your last visit?”    NO    Have you had a mammogram?”   YES - Where: [facility] Nurse/CMA to request most recent records if not in the chart    No breast cancer screening on file       “Have you had a colorectal cancer screening such as a colonoscopy/FIT/Cologuard?    YES - Type: Colonoscopy - Where: [facility] Nurse/CMA to request most recent records if not in the chart     No colonoscopy on file  No cologuard on file  No FIT/FOBT on file   No flexible sigmoidoscopy on file     Chief Complaint   Patient presents with    Follow-up    Diabetes     BG- 98  A1C- 5.7        
reports increased WBC's- has appointment with Oncologist    [place]     MS (multiple sclerosis) ([facility])        Past Surgical History:   Procedure Laterality Date    CHOLECYSTECTOMY      FOOT SURGERY  June 2022    4th toe    GYN      hysterectomy    ORTHOPEDIC SURGERY      toe, bilateral big toes    OVARY REMOVAL Left     january 2022    PARTIAL HYSTERECTOMY (CERVIX NOT REMOVED)      TUBAL LIGATION      WISDOM TOOTH EXTRACTION Bilateral     Bottoms only        Social History     Socioeconomic History    Marital status: Single     Spouse name: Not on file    Number of children: Not on file    Years of education: Not on file    Highest education level: Not on file   Occupational History    Not on file   Tobacco Use    Smoking status: Every Day     Current packs/day: 0.50     Average packs/day: 0.5 packs/day for 10.0 years (5.0 ttl pk-yrs)     Types: Cigarettes    Smokeless tobacco: Never    Tobacco comments:     5-6/daily   Vaping Use    Vaping status: Never Used   Substance and Sexual Activity    Alcohol use: Not Currently    Drug use: Never    Sexual activity: Yes     Partners: Male   Other Topics Concern    Not on file   Social History Narrative    Not on file     Social Determinants of Health     Financial Resource Strain: Not on file   Food Insecurity: Not on file   Transportation Needs: Not on file   Physical Activity: Not on file   Stress: Not on file   Social Connections: Not on file   Intimate Partner Violence: Not on file   Housing Stability: Not on file       Family History   Problem Relation Age of Onset    Prostate Cancer Paternal Grandfather     Coronary Art Dis Paternal Grandfather     Diabetes Paternal Grandfather     Diabetes Maternal Grandfather     Coronary Art Dis Maternal Grandfather     Stomach Cancer Maternal Grandfather     Cancer Maternal Grandfather     Breast Cancer Maternal Aunt     Cancer Maternal Aunt     Lung Cancer Other     Diabetes Other     Heart Disease Other         MI    Coronary

## 2024-12-04 ENCOUNTER — TELEPHONE (OUTPATIENT)
Age: 49
End: 2024-12-04

## 2024-12-04 NOTE — TELEPHONE ENCOUNTER
Stated they need an order for the pt for GA INJ, METHYLPRED SOD SUCC 5MG, stated order was sent to wrong pt's chart. Stated please fax new order to [phone]. Also would like to know if the 5 days of treatment has to be back to back. Please call to get clarifications! Also can email to [email or web address]

## 2024-12-09 ENCOUNTER — HOSPITAL ENCOUNTER (OUTPATIENT)
Facility: HOSPITAL | Age: 49
Setting detail: INFUSION SERIES
Discharge: HOME | End: 2024-12-09
Payer: COMMERCIAL

## 2024-12-09 ENCOUNTER — TELEPHONE (OUTPATIENT)
Age: 49
End: 2024-12-09

## 2024-12-09 VITALS
SYSTOLIC BLOOD PRESSURE: 112 MMHG | DIASTOLIC BLOOD PRESSURE: 71 MMHG | TEMPERATURE: 98 F | HEART RATE: 67 BPM | RESPIRATION RATE: 16 BRPM

## 2024-12-09 PROCEDURE — 96365 THER/PROPH/DIAG IV INF INIT: CPT

## 2024-12-09 PROCEDURE — 2580000003 HC RX 258: Performed by: NURSE PRACTITIONER

## 2024-12-09 PROCEDURE — 6360000002 HC RX W HCPCS: Performed by: NURSE PRACTITIONER

## 2024-12-09 RX ADMIN — METHYLPREDNISOLONE SODIUM SUCCINATE 1000 MG: 1 INJECTION INTRAMUSCULAR; INTRAVENOUS at 09:59

## 2024-12-09 ASSESSMENT — PAIN SCALES - GENERAL: PAINLEVEL_OUTOF10: 0

## 2024-12-09 NOTE — TELEPHONE ENCOUNTER
HIPAA Verified (if caller is someone other than patient):           Reason for Call: Other Neuro    Reason For Call:   Patient will be faxing over the Re enrollment form from [employer] for Kesimpta    Details from Caller:           Message: (any additional details from patient/caller not covered above)          Level 1 Calls - attempted to reach practice?         Reason Call Marked High Priority (if applicable):

## 2024-12-09 NOTE — PROGRESS NOTES
Pt arrived for soulmedrol iv. Pt tolerated without complaints or reactions. Side effects and reactions reviewed with pt. Pt verb understanding. Pt amb herself with rollator assistance  for dc home. Next appoint. Scheduled for [facility]. 12/10/24

## 2024-12-10 ENCOUNTER — HOSPITAL ENCOUNTER (OUTPATIENT)
Facility: HOSPITAL | Age: 49
Setting detail: INFUSION SERIES
Discharge: HOME | End: 2024-12-10
Payer: COMMERCIAL

## 2024-12-10 VITALS
TEMPERATURE: 97.7 F | OXYGEN SATURATION: 100 % | DIASTOLIC BLOOD PRESSURE: 68 MMHG | BODY MASS INDEX: 23.81 KG/M2 | SYSTOLIC BLOOD PRESSURE: 110 MMHG | RESPIRATION RATE: 18 BRPM | HEART RATE: 75 BPM | WEIGHT: 152 LBS

## 2024-12-10 PROCEDURE — 96365 THER/PROPH/DIAG IV INF INIT: CPT

## 2024-12-10 PROCEDURE — 2580000003 HC RX 258: Performed by: NURSE PRACTITIONER

## 2024-12-10 PROCEDURE — 6360000002 HC RX W HCPCS: Performed by: NURSE PRACTITIONER

## 2024-12-10 RX ADMIN — METHYLPREDNISOLONE SODIUM SUCCINATE 1000 MG: 1 INJECTION INTRAMUSCULAR; INTRAVENOUS at 10:04

## 2024-12-10 ASSESSMENT — PAIN SCALES - GENERAL
PAINLEVEL_OUTOF10: 0
PAINLEVEL_OUTOF10: 0

## 2024-12-10 NOTE — PROGRESS NOTES
Patient here today for solumedrol infusion per MD order. Patient walked in with assistance of rolling walker. Hx of MS. States she is a little stronger than yesterday. Today is day #2 of  infusions. VS stable. Settled into [facility] stretcher for comfort. Food/drink offered. Call bell in reach. Medication order released. Pharmacy called.    1004  Solumedrol 1000 mg infusion started per MD order and per protocol to left AC PIV without difficulty. To infuse for one hour. See MAR.  Patient to alert nurse if she has signs of any infusion reactions. Call bell in reach.    1110 Patient tolerated Solumedrol 1000 mg infusion. VS stable. Patient with no complaints. Discharge instructions/handout given. Verbally understood. Patients 3rd infusion tomorrow 12/11/24 at 0800. Patient to be discharged in stable condition.

## 2024-12-11 ENCOUNTER — HOSPITAL ENCOUNTER (OUTPATIENT)
Facility: HOSPITAL | Age: 49
Setting detail: INFUSION SERIES
Discharge: HOME | End: 2024-12-11
Payer: COMMERCIAL

## 2024-12-11 VITALS
OXYGEN SATURATION: 100 % | RESPIRATION RATE: 18 BRPM | HEART RATE: 70 BPM | DIASTOLIC BLOOD PRESSURE: 68 MMHG | SYSTOLIC BLOOD PRESSURE: 104 MMHG

## 2024-12-11 PROCEDURE — 2580000003 HC RX 258: Performed by: NURSE PRACTITIONER

## 2024-12-11 PROCEDURE — 6360000002 HC RX W HCPCS: Performed by: NURSE PRACTITIONER

## 2024-12-11 PROCEDURE — 96365 THER/PROPH/DIAG IV INF INIT: CPT

## 2024-12-11 RX ADMIN — METHYLPREDNISOLONE SODIUM SUCCINATE 1000 MG: 1 INJECTION INTRAMUSCULAR; INTRAVENOUS at 09:26

## 2024-12-11 ASSESSMENT — PAIN SCALES - GENERAL
PAINLEVEL_OUTOF10: 0
PAINLEVEL_OUTOF10: 0

## 2024-12-11 NOTE — PROGRESS NOTES
Pt arrived in [facility] for Soulumedrol infusion. Pt denies any reaction or side effect from previous infusion. Solumedrol given as ordered, pt tolerated without complaints. DC instructions reviewed and pt amb self out with rollator for dc home. Next infusion scheduled for tomorrow 12/12/24.    Electrodesiccation Text: The wound bed was treated with electrodesiccation after the biopsy was performed.

## 2024-12-12 ENCOUNTER — HOSPITAL ENCOUNTER (OUTPATIENT)
Facility: HOSPITAL | Age: 49
Setting detail: INFUSION SERIES
Discharge: HOME | End: 2024-12-12
Payer: COMMERCIAL

## 2024-12-12 VITALS
SYSTOLIC BLOOD PRESSURE: 115 MMHG | WEIGHT: 152 LBS | TEMPERATURE: 98 F | HEART RATE: 61 BPM | HEIGHT: 67 IN | BODY MASS INDEX: 23.86 KG/M2 | RESPIRATION RATE: 18 BRPM | OXYGEN SATURATION: 100 % | DIASTOLIC BLOOD PRESSURE: 75 MMHG

## 2024-12-12 PROCEDURE — 96365 THER/PROPH/DIAG IV INF INIT: CPT

## 2024-12-12 PROCEDURE — 6360000002 HC RX W HCPCS: Performed by: NURSE PRACTITIONER

## 2024-12-12 PROCEDURE — 2580000003 HC RX 258: Performed by: NURSE PRACTITIONER

## 2024-12-12 RX ADMIN — METHYLPREDNISOLONE SODIUM SUCCINATE 1000 MG: 1 INJECTION INTRAMUSCULAR; INTRAVENOUS at 09:17

## 2024-12-12 NOTE — PROGRESS NOTES
Pt arrived in [facility] for Solumedrol infusion. Uses rollator d/t bilateral leg weakness. Pt denies any reaction or side effect from previous infusion. PIV placed left AC. Declined snack/drink. Call bell within reach. Will monitor.     0917: Infusion started.     1018: Infusion completed.   IV removed-- tip intact, no redness, swelling or bleeding noted. VSS. Patient in no acute distress. DC instructions reviewed     -- verbalized understanding. Patient walked out to private vehicle independently using rollator-- no distress noted.

## 2024-12-13 ENCOUNTER — HOSPITAL ENCOUNTER (OUTPATIENT)
Facility: HOSPITAL | Age: 49
Setting detail: INFUSION SERIES
Discharge: HOME | End: 2024-12-13
Payer: COMMERCIAL

## 2024-12-13 VITALS
HEART RATE: 60 BPM | SYSTOLIC BLOOD PRESSURE: 112 MMHG | RESPIRATION RATE: 18 BRPM | OXYGEN SATURATION: 98 % | TEMPERATURE: 97.7 F | DIASTOLIC BLOOD PRESSURE: 73 MMHG

## 2024-12-13 PROCEDURE — 6360000002 HC RX W HCPCS: Performed by: NURSE PRACTITIONER

## 2024-12-13 PROCEDURE — 96365 THER/PROPH/DIAG IV INF INIT: CPT

## 2024-12-13 PROCEDURE — 2580000003 HC RX 258: Performed by: NURSE PRACTITIONER

## 2024-12-13 RX ADMIN — METHYLPREDNISOLONE SODIUM SUCCINATE 1000 MG: 1 INJECTION INTRAMUSCULAR; INTRAVENOUS at 08:42

## 2024-12-13 ASSESSMENT — PAIN SCALES - GENERAL: PAINLEVEL_OUTOF10: 0

## 2024-12-13 NOTE — PERIOP NOTE
Patient to [facility] for Solumedrol infusion. Patient ambulated self onto unit with assistance of roll aid with no issues. Patient placed in bed. VS obtained and stable per patient baseline. Orders placed as transcribed from paper. Medication and health history reviewed and updated. Snack and beverage provided. Patient resting comfortably at this time with call bell within reach.     0842-- Medication initiated at this time with no issues or reactions noted. Patient resting comfortably in bed at this time with call bell within reach.   0937-- Medication completed at this time. No issues or reactions noted. PIV x1 removed with cath tip intact.     Discharge paperwork declined by patient. Patient ambulated self off of unit with assistance of roll aid with no issues. Patient condition stable at time of discharge.

## 2024-12-30 ENCOUNTER — PATIENT MESSAGE (OUTPATIENT)
Age: 49
End: 2024-12-30

## 2024-12-30 DIAGNOSIS — G35 RELAPSING REMITTING MULTIPLE SCLEROSIS (HCC): Primary | ICD-10-CM

## 2024-12-30 DIAGNOSIS — Z51.81 MEDICATION MONITORING ENCOUNTER: ICD-10-CM

## 2025-01-02 DIAGNOSIS — Z51.81 MEDICATION MONITORING ENCOUNTER: ICD-10-CM

## 2025-01-02 DIAGNOSIS — G35 RELAPSING REMITTING MULTIPLE SCLEROSIS (HCC): ICD-10-CM

## 2025-01-06 ENCOUNTER — PATIENT MESSAGE (OUTPATIENT)
Age: 50
End: 2025-01-06

## 2025-01-08 DIAGNOSIS — G35 RELAPSING REMITTING MULTIPLE SCLEROSIS (HCC): ICD-10-CM

## 2025-01-09 RX ORDER — OFATUMUMAB 20 MG/.4ML
INJECTION, SOLUTION SUBCUTANEOUS
Qty: 4 ADJUSTABLE DOSE PRE-FILLED PEN SYRINGE | Refills: 0 | Status: SHIPPED | OUTPATIENT
Start: 2025-01-09

## 2025-01-09 RX ORDER — DULAGLUTIDE 1.5 MG/.5ML
1.5 INJECTION, SOLUTION SUBCUTANEOUS WEEKLY
Qty: 2 ML | Refills: 2 | Status: SHIPPED | OUTPATIENT
Start: 2025-01-09

## 2025-01-23 LAB
ALBUMIN SERPL-MCNC: 4.6 G/DL (ref 3.9–4.9)
ALP SERPL-CCNC: 85 IU/L (ref 44–121)
ALT SERPL-CCNC: 19 IU/L (ref 0–32)
AST SERPL-CCNC: 21 IU/L (ref 0–40)
BASOPHILS # BLD AUTO: 0 X10E3/UL (ref 0–0.2)
BASOPHILS NFR BLD AUTO: 0 %
BILIRUB SERPL-MCNC: 0.2 MG/DL (ref 0–1.2)
BUN SERPL-MCNC: 8 MG/DL (ref 6–24)
BUN/CREAT SERPL: 12 (ref 9–23)
CALCIUM SERPL-MCNC: 10 MG/DL (ref 8.7–10.2)
CHLORIDE SERPL-SCNC: 104 MMOL/L (ref 96–106)
CO2 SERPL-SCNC: 22 MMOL/L (ref 20–29)
CREAT SERPL-MCNC: 0.69 MG/DL (ref 0.57–1)
EGFRCR SERPLBLD CKD-EPI 2021: 108 ML/MIN/1.73
EOSINOPHIL # BLD AUTO: 0.1 X10E3/UL (ref 0–0.4)
EOSINOPHIL NFR BLD AUTO: 1 %
ERYTHROCYTE [DISTWIDTH] IN BLOOD BY AUTOMATED COUNT: 12.7 % (ref 11.7–15.4)
GLOBULIN SER CALC-MCNC: 2.1 G/DL (ref 1.5–4.5)
GLUCOSE SERPL-MCNC: 94 MG/DL (ref 70–99)
HBV CORE AB SERPL QL IA: NEGATIVE
HBV SURFACE AB SER QL: NON REACTIVE
HCT VFR BLD AUTO: 44.9 % (ref 34–46.6)
HGB BLD-MCNC: 15 G/DL (ref 11.1–15.9)
IMM GRANULOCYTES # BLD AUTO: 0 X10E3/UL (ref 0–0.1)
IMM GRANULOCYTES NFR BLD AUTO: 0 %
LYMPHOCYTES # BLD AUTO: 3.2 X10E3/UL (ref 0.7–3.1)
LYMPHOCYTES NFR BLD AUTO: 32 %
MCH RBC QN AUTO: 29.9 PG (ref 26.6–33)
MCHC RBC AUTO-ENTMCNC: 33.4 G/DL (ref 31.5–35.7)
MCV RBC AUTO: 89 FL (ref 79–97)
MONOCYTES # BLD AUTO: 0.5 X10E3/UL (ref 0.1–0.9)
MONOCYTES NFR BLD AUTO: 5 %
NEUTROPHILS # BLD AUTO: 6.1 X10E3/UL (ref 1.4–7)
NEUTROPHILS NFR BLD AUTO: 62 %
PLATELET # BLD AUTO: 305 X10E3/UL (ref 150–450)
POTASSIUM SERPL-SCNC: 4.2 MMOL/L (ref 3.5–5.2)
PROT SERPL-MCNC: 6.7 G/DL (ref 6–8.5)
RBC # BLD AUTO: 5.02 X10E6/UL (ref 3.77–5.28)
SODIUM SERPL-SCNC: 141 MMOL/L (ref 134–144)
WBC # BLD AUTO: 9.9 X10E3/UL (ref 3.4–10.8)

## 2025-01-23 RX ORDER — BACLOFEN 20 MG/1
20 TABLET ORAL 3 TIMES DAILY PRN
Qty: 90 TABLET | Refills: 0 | OUTPATIENT
Start: 2025-01-23

## 2025-01-24 LAB
IGA SERPL-MCNC: 76 MG/DL (ref 87–352)
IGE SERPL-ACNC: 6 IU/ML (ref 6–495)
IGG SERPL-MCNC: 787 MG/DL (ref 586–1602)
IGM SERPL-MCNC: 282 MG/DL (ref 26–217)

## 2025-01-31 ENCOUNTER — TELEMEDICINE (OUTPATIENT)
Age: 50
End: 2025-01-31
Payer: COMMERCIAL

## 2025-01-31 DIAGNOSIS — R26.9 ABNORMALITY OF GAIT AND MOBILITY: ICD-10-CM

## 2025-01-31 DIAGNOSIS — R53.1 WEAKNESS: ICD-10-CM

## 2025-01-31 DIAGNOSIS — G35 RELAPSING REMITTING MULTIPLE SCLEROSIS (HCC): Primary | ICD-10-CM

## 2025-01-31 PROCEDURE — 99214 OFFICE O/P EST MOD 30 MIN: CPT | Performed by: NURSE PRACTITIONER

## 2025-01-31 RX ORDER — OFATUMUMAB 20 MG/.4ML
20 INJECTION, SOLUTION SUBCUTANEOUS
Qty: 3 ADJUSTABLE DOSE PRE-FILLED PEN SYRINGE | Refills: 3 | Status: SHIPPED | OUTPATIENT
Start: 2025-01-31 | End: 2025-01-31 | Stop reason: SDUPTHER

## 2025-01-31 RX ORDER — ESTRADIOL 0.5 MG/1
0.5 TABLET ORAL DAILY
COMMUNITY
Start: 2025-01-22

## 2025-01-31 RX ORDER — ESTRADIOL 0.1 MG/G
CREAM VAGINAL
COMMUNITY
Start: 2025-01-22

## 2025-01-31 RX ORDER — VALACYCLOVIR HYDROCHLORIDE 1 G/1
TABLET, FILM COATED ORAL
COMMUNITY
Start: 2025-01-27

## 2025-01-31 RX ORDER — OFATUMUMAB 20 MG/.4ML
20 INJECTION, SOLUTION SUBCUTANEOUS
Qty: 3 ADJUSTABLE DOSE PRE-FILLED PEN SYRINGE | Refills: 3 | Status: ACTIVE | OUTPATIENT
Start: 2025-01-31

## 2025-01-31 NOTE — PROGRESS NOTES
round, reactive to light   II: optic disc Not assessed   III,VII: ptosis none   III,IV,VI: extraocular muscles  Full ROM   V: mastication normal   V: facial light touch sensation  Not assessed   VII: facial muscle function   symmetric   VIII: hearing symmetric   IX: soft palate elevation  normal   XI: trapezius strength  Not assessed   XI: sternocleidomastoid strength Not assessed   XI: neck flexion strength  Not assessed   XII: tongue  midline     Motor Examination: Normal tone and bulk.  Strength was not assessed      Sensory exam:  Not assessed     Coordination:  No resting or intention tremor    Gait and Station:  No muscle wasting or fasiculations noted.      Reflexes:  Not assessed    Assessment & Plan:      Diagnosis Orders   1. Relapsing remitting multiple sclerosis (HCC)  Ofatumumab (KESIMPTA) 20 MG/0.4ML SOAJ    DISCONTINUED: Ofatumumab (KESIMPTA) 20 MG/0.4ML SOAJ      2. Abnormality of gait and mobility        3. Weakness          RRMS continues to be associated with balance and mobility issues and weakness. This seems a bit worse since her recent exacerbation despite treatment with the IV steroids.  She plans to start the Kesimpta but is waiting to ensure that she will be able to continue it after she finishes the initial doses. She will call to set up PT again to help with the mobility and weakness. We will plan to get a new baseline MRI at four months after starting the Kesimpta. I would like to see her about a month after she starts the Kesimpta. She will let me know definitively when she starts it through the patient portal.     We discussed the expected course, resolution and complications of the diagnosis(es) in detail.  Medication risks, benefits, costs, interactions, and alternatives were discussed as indicated.  I advised her to contact the office if her condition worsens, changes or fails to improve as anticipated. She expressed understanding with the diagnosis(es) and plan.     Marlene Yun,

## 2025-02-05 ENCOUNTER — TELEPHONE (OUTPATIENT)
Age: 50
End: 2025-02-05

## 2025-02-05 NOTE — TELEPHONE ENCOUNTER
Requesting clarification on status of Kesimpta case. Stated they received a request to close case but later received notice that patient requested refill. Please contact

## 2025-02-19 NOTE — TELEPHONE ENCOUNTER
I am sending the message located in MSOT link to patient to contact [facility] for further details -     Patient receives free drug through [facility] - [place], [place] - [address] [address] [phone] pharmacy. Prescription has been routed there. Please call your [facility] Liaison with any questions at [phone].

## 2025-02-19 NOTE — TELEPHONE ENCOUNTER
Ashlyn calling back because she needs clarification as to whether this patient still on medication Kesimpta and whether they still need to help with the cost of this medication.

## 2025-03-20 ENCOUNTER — OFFICE VISIT (OUTPATIENT)
Age: 50
End: 2025-03-20
Payer: COMMERCIAL

## 2025-03-20 VITALS
HEIGHT: 67 IN | RESPIRATION RATE: 16 BRPM | DIASTOLIC BLOOD PRESSURE: 79 MMHG | OXYGEN SATURATION: 99 % | SYSTOLIC BLOOD PRESSURE: 112 MMHG | HEART RATE: 75 BPM | TEMPERATURE: 98.2 F | BODY MASS INDEX: 24.11 KG/M2 | WEIGHT: 153.6 LBS

## 2025-03-20 DIAGNOSIS — E11.65 TYPE 2 DIABETES MELLITUS WITH HYPERGLYCEMIA, WITH LONG-TERM CURRENT USE OF INSULIN (HCC): Primary | ICD-10-CM

## 2025-03-20 DIAGNOSIS — Z79.4 TYPE 2 DIABETES MELLITUS WITH HYPERGLYCEMIA, WITH LONG-TERM CURRENT USE OF INSULIN (HCC): Primary | ICD-10-CM

## 2025-03-20 DIAGNOSIS — E78.2 MIXED HYPERLIPIDEMIA: ICD-10-CM

## 2025-03-20 LAB
GLUCOSE, POC: 112 MG/DL
HBA1C MFR BLD: 5.8 %

## 2025-03-20 PROCEDURE — 3044F HG A1C LEVEL LT 7.0%: CPT | Performed by: STUDENT IN AN ORGANIZED HEALTH CARE EDUCATION/TRAINING PROGRAM

## 2025-03-20 PROCEDURE — 83036 HEMOGLOBIN GLYCOSYLATED A1C: CPT | Performed by: STUDENT IN AN ORGANIZED HEALTH CARE EDUCATION/TRAINING PROGRAM

## 2025-03-20 PROCEDURE — 99213 OFFICE O/P EST LOW 20 MIN: CPT | Performed by: STUDENT IN AN ORGANIZED HEALTH CARE EDUCATION/TRAINING PROGRAM

## 2025-03-20 PROCEDURE — 82962 GLUCOSE BLOOD TEST: CPT | Performed by: STUDENT IN AN ORGANIZED HEALTH CARE EDUCATION/TRAINING PROGRAM

## 2025-03-20 RX ORDER — DULAGLUTIDE 1.5 MG/.5ML
1.5 INJECTION, SOLUTION SUBCUTANEOUS WEEKLY
Qty: 6 ML | Refills: 1 | Status: SHIPPED | OUTPATIENT
Start: 2025-03-20

## 2025-03-20 NOTE — PROGRESS NOTES
\"Have you been to the ER, urgent care clinic since your last visit?  Hospitalized since your last visit?\"    NO    “Have you seen or consulted any other health care providers outside our system since your last visit?”    NO    Have you had a mammogram?”   YES - Where: [facility]    No breast cancer screening on file       “Have you had a colorectal cancer screening such as a colonoscopy/FIT/Cologuard?    NO    No colonoscopy on file  No cologuard on file  No FIT/FOBT on file   No flexible sigmoidoscopy on file     “Have you had a diabetic eye exam?”    YES - Where: [facility]      No diabetic eye exam on file       A1C 5.8    Chief Complaint   Patient presents with    Follow-up    Diabetes     /79 (BP Site: Right Upper Arm, Patient Position: Sitting, BP Cuff Size: Medium Adult)   Pulse 75   Temp 98.2 °F (36.8 °C) (Temporal)   Resp 16   Ht 1.702 m (5' 7\")   Wt 69.7 kg (153 lb 9.6 oz)   SpO2 99%   BMI 24.06 kg/m²          
pulse 75, temperature 98.2 °F (36.8 °C), temperature source Temporal, resp. rate 16, height 1.702 m (5' 7\"), weight 69.7 kg (153 lb 9.6 oz), SpO2 99%. Body mass index is 24.06 kg/m².  General: pleasant, no distress, good eye contact  HEENT: no pallor, no periorbital edema, EOMI  Neck: supple, no thyromegaly, no nodules  Gastrointestinal: soft, non distended    Musculoskeletal: no edema  Neurological: alert and oriented  Psychiatric: normal mood and affect    Data Reviewed:     Lab Results   Component Value Date/Time    GLUCPOC 112 03/20/2025 10:16 AM    LDL 50.4 02/07/2023 07:28 AM      Lab Results   Component Value Date/Time    GFRAA >60 01/06/2022 09:59 AM    BUN 8 01/22/2025 11:00 AM     01/22/2025 11:00 AM    K 4.2 01/22/2025 11:00 AM     01/22/2025 11:00 AM    CO2 22 01/22/2025 11:00 AM       Results for orders placed or performed in visit on 03/20/25   AMB POC HEMOGLOBIN A1C   Result Value Ref Range    Hemoglobin A1C, POC 5.8 %   AMB POC GLUCOSE BLOOD, BY GLUCOSE MONITORING DEVICE   Result Value Ref Range    Glucose,  MG/DL             Assessment/Plan:     1. Type 2 diabetes mellitus with hyperglycemia, with long-term current use of insulin ([facility])    2/2023 A1c 6.5 ---> 7/24 A1c is 6.2 while on metformin however patient having iintolerance to metformin- having muscle ache  Results for orders placed or performed in visit on 03/20/25   AMB POC HEMOGLOBIN A1C   Result Value Ref Range    Hemoglobin A1C, POC 5.8 %   AMB POC GLUCOSE BLOOD, BY GLUCOSE MONITORING DEVICE   Result Value Ref Range    Glucose,  MG/DL         Continue trulicity 1.5 MG/week refilled   -Counseled on hypoglycemia  -Counseled on follow up with podiatry and ophthalmology   -Counseled on lifestyle measures       2. Hyperlipidemia   Reviewed labs from 6-24   Cholesterol, Total   203    Triglycerides   158    HDL Cholesterol   50  mg/dL     VLDL Cholesterol Dnenis   28  mg/dL 5 - 40    LDL Chol Calc ([facility])   125  Discussed

## 2025-03-21 ENCOUNTER — TELEPHONE (OUTPATIENT)
Age: 50
End: 2025-03-21

## 2025-03-21 NOTE — TELEPHONE ENCOUNTER
KeSimpta - please contact patient    this is an MS drug,   New PA needed  New re-Enrollment needed w/ provider signature   Follow up with [facility]     Enrollment form is uploaded in Media - to print, complete, sign and fax to [employer] with the authorization.     Patient had visit with Cindy on 1/31/25    Patient sends [employer] message to  office on 2/5 and routed to nursing.     Details in email sent to Maxime White Alyson/ASHLEY and Therese Pearson.

## 2025-04-14 ENCOUNTER — TELEPHONE (OUTPATIENT)
Age: 50
End: 2025-04-14

## 2025-04-14 NOTE — TELEPHONE ENCOUNTER
Ke[employer]pta     Instructions for Start Form and how to submit it are noted on my 3/21/25 documentation.      Cindy notes that she signed the Start Form.     I cannot tell or see where the signed form was sent.     This is not related to a prior authorization since patient is trying to get free drug per [facility].     I called [facility] today:   3-22 denied  3/24 appeal was received by Kesha LASSITER of [place] / [phone]  4/4 [facility] - still no decision on the appeal, it could take up to 30 days.   Former[facility] Appeal Case# [id number] was given to me by the [facility] KeSim[employer] rep but they do not have a copy of the denial because they are a 3rd party.     The appeal case is being handled by Brenda Lowe, with [facility].     Sent FYI message to Brenda to let her know what current status is and ask that she notify patient of pending appeal status.     Brenda, please upload copy of denial letter.

## 2025-04-16 ENCOUNTER — APPOINTMENT (OUTPATIENT)
Facility: HOSPITAL | Age: 50
End: 2025-04-16
Payer: COMMERCIAL

## 2025-04-16 ENCOUNTER — HOSPITAL ENCOUNTER (EMERGENCY)
Facility: HOSPITAL | Age: 50
Discharge: HOME | End: 2025-04-16
Payer: COMMERCIAL

## 2025-04-16 VITALS
SYSTOLIC BLOOD PRESSURE: 121 MMHG | HEART RATE: 77 BPM | TEMPERATURE: 98.1 F | DIASTOLIC BLOOD PRESSURE: 81 MMHG | OXYGEN SATURATION: 100 % | RESPIRATION RATE: 16 BRPM

## 2025-04-16 DIAGNOSIS — M79.674 PAIN OF TOE OF RIGHT FOOT: ICD-10-CM

## 2025-04-16 DIAGNOSIS — L08.9 TOE INFECTION: ICD-10-CM

## 2025-04-16 DIAGNOSIS — M79.89 TOE SWELLING: Primary | ICD-10-CM

## 2025-04-16 LAB
ALBUMIN SERPL-MCNC: 4.2 G/DL (ref 3.5–5)
ALBUMIN/GLOB SERPL: 1.1 (ref 1.1–2.2)
ALP SERPL-CCNC: 97 U/L (ref 45–117)
ALT SERPL-CCNC: 33 U/L (ref 12–78)
ANION GAP SERPL CALC-SCNC: 3 MMOL/L (ref 2–12)
AST SERPL W P-5'-P-CCNC: 19 U/L (ref 15–37)
BASOPHILS # BLD: 0.04 K/UL (ref 0–0.1)
BASOPHILS NFR BLD: 0.3 % (ref 0–1)
BILIRUB SERPL-MCNC: 0.2 MG/DL (ref 0.2–1)
BUN SERPL-MCNC: 7 MG/DL (ref 6–20)
BUN/CREAT SERPL: 9 (ref 12–20)
CA-I BLD-MCNC: 9.4 MG/DL (ref 8.5–10.1)
CHLORIDE SERPL-SCNC: 111 MMOL/L (ref 97–108)
CO2 SERPL-SCNC: 26 MMOL/L (ref 21–32)
CREAT SERPL-MCNC: 0.75 MG/DL (ref 0.55–1.02)
CRP SERPL-MCNC: <0.29 MG/DL (ref 0–0.3)
DIFFERENTIAL METHOD BLD: ABNORMAL
EOSINOPHIL # BLD: 0.27 K/UL (ref 0–0.4)
EOSINOPHIL NFR BLD: 2.2 % (ref 0–7)
ERYTHROCYTE [DISTWIDTH] IN BLOOD BY AUTOMATED COUNT: 12.8 % (ref 11.5–14.5)
ERYTHROCYTE [SEDIMENTATION RATE] IN BLOOD: 7 MM/HR (ref 0–20)
GLOBULIN SER CALC-MCNC: 3.9 G/DL (ref 2–4)
GLUCOSE SERPL-MCNC: 97 MG/DL (ref 65–100)
HCT VFR BLD AUTO: 44 % (ref 35–47)
HGB BLD-MCNC: 14.7 G/DL (ref 11.5–16)
IMM GRANULOCYTES # BLD AUTO: 0.03 K/UL (ref 0–0.04)
IMM GRANULOCYTES NFR BLD AUTO: 0.2 % (ref 0–0.5)
LYMPHOCYTES # BLD: 3.53 K/UL (ref 0.8–3.5)
LYMPHOCYTES NFR BLD: 29.1 % (ref 12–49)
MCH RBC QN AUTO: 29.5 PG (ref 26–34)
MCHC RBC AUTO-ENTMCNC: 33.4 G/DL (ref 30–36.5)
MCV RBC AUTO: 88.4 FL (ref 80–99)
MONOCYTES # BLD: 0.54 K/UL (ref 0–1)
MONOCYTES NFR BLD: 4.5 % (ref 5–13)
NEUTS SEG # BLD: 7.71 K/UL (ref 1.8–8)
NEUTS SEG NFR BLD: 63.7 % (ref 32–75)
NRBC # BLD: 0 K/UL (ref 0–0.01)
NRBC BLD-RTO: 0 PER 100 WBC
PLATELET # BLD AUTO: 274 K/UL (ref 150–400)
PMV BLD AUTO: 11.4 FL (ref 8.9–12.9)
POTASSIUM SERPL-SCNC: 3.9 MMOL/L (ref 3.5–5.1)
PROT SERPL-MCNC: 8.1 G/DL (ref 6.4–8.2)
RBC # BLD AUTO: 4.98 M/UL (ref 3.8–5.2)
SODIUM SERPL-SCNC: 140 MMOL/L (ref 136–145)
WBC # BLD AUTO: 12.1 K/UL (ref 3.6–11)

## 2025-04-16 PROCEDURE — 73630 X-RAY EXAM OF FOOT: CPT

## 2025-04-16 PROCEDURE — 86140 C-REACTIVE PROTEIN: CPT

## 2025-04-16 PROCEDURE — 36415 COLL VENOUS BLD VENIPUNCTURE: CPT

## 2025-04-16 PROCEDURE — 99284 EMERGENCY DEPT VISIT MOD MDM: CPT

## 2025-04-16 PROCEDURE — 85652 RBC SED RATE AUTOMATED: CPT

## 2025-04-16 PROCEDURE — 80053 COMPREHEN METABOLIC PANEL: CPT

## 2025-04-16 PROCEDURE — 85025 COMPLETE CBC W/AUTO DIFF WBC: CPT

## 2025-04-16 RX ORDER — IBUPROFEN 600 MG/1
600 TABLET, FILM COATED ORAL EVERY 6 HOURS PRN
Qty: 80 TABLET | Refills: 0 | Status: SHIPPED | OUTPATIENT
Start: 2025-04-16

## 2025-04-16 RX ORDER — DOXYCYCLINE HYCLATE 100 MG
100 TABLET ORAL 2 TIMES DAILY
Qty: 20 TABLET | Refills: 0 | Status: SHIPPED | OUTPATIENT
Start: 2025-04-16 | End: 2025-04-26

## 2025-04-16 ASSESSMENT — PAIN SCALES - GENERAL: PAINLEVEL_OUTOF10: 4

## 2025-04-16 ASSESSMENT — LIFESTYLE VARIABLES
HOW OFTEN DO YOU HAVE A DRINK CONTAINING ALCOHOL: NEVER
HOW MANY STANDARD DRINKS CONTAINING ALCOHOL DO YOU HAVE ON A TYPICAL DAY: PATIENT DOES NOT DRINK

## 2025-04-16 NOTE — ED TRIAGE NOTES
Pt reports toe pain, denies any wounds, on her r foot. Pt reports hx of surgery on her great toes. Pt reports toes are red and inflamed. Pt was prescribed abx and antifungals by Pcp, has been taking for 2 weeks.

## 2025-04-16 NOTE — DISCHARGE INSTRUCTIONS
Thank you for choosing our Emergency Department for your care.  It is our privilege to care for you in your time of need.  In the next several days, you may receive a survey via email or mailed to your home about your experience with our team.  We would greatly appreciate you taking a few minutes to complete the survey, as we use this information to learn what we have done well and what we could be doing better. Thank you for trusting us with your care!    Below you will find a list of your tests from today's visit.   Labs and Radiology Studies  Recent Results (from the past 12 hours)   Sedimentation Rate    Collection Time: 04/16/25 12:03 PM   Result Value Ref Range    Sed Rate, Automated 7 0 - 20 mm/hr   C-Reactive Protein    Collection Time: 04/16/25 12:03 PM   Result Value Ref Range    CRP <0.29 0.00 - 0.30 mg/dL   CBC with Auto Differential    Collection Time: 04/16/25 12:03 PM   Result Value Ref Range    WBC 12.1 (H) 3.6 - 11.0 K/uL    RBC 4.98 3.80 - 5.20 M/uL    Hemoglobin 14.7 11.5 - 16.0 g/dL    Hematocrit 44.0 35.0 - 47.0 %    MCV 88.4 80.0 - 99.0 FL    MCH 29.5 26.0 - 34.0 PG    MCHC 33.4 30.0 - 36.5 g/dL    RDW 12.8 11.5 - 14.5 %    Platelets 274 150 - 400 K/uL    MPV 11.4 8.9 - 12.9 FL    Nucleated RBCs 0.0 0.0  WBC    nRBC 0.00 0.00 - 0.01 K/uL    Neutrophils % 63.7 32.0 - 75.0 %    Lymphocytes % 29.1 12.0 - 49.0 %    Monocytes % 4.5 (L) 5.0 - 13.0 %    Eosinophils % 2.2 0.0 - 7.0 %    Basophils % 0.3 0.0 - 1.0 %    Immature Granulocytes % 0.2 0 - 0.5 %    Neutrophils Absolute 7.71 1.80 - 8.00 K/UL    Lymphocytes Absolute 3.53 (H) 0.80 - 3.50 K/UL    Monocytes Absolute 0.54 0.00 - 1.00 K/UL    Eosinophils Absolute 0.27 0.00 - 0.40 K/UL    Basophils Absolute 0.04 0.00 - 0.10 K/UL    Immature Granulocytes Absolute 0.03 0.00 - 0.04 K/UL    Differential Type AUTOMATED     Comprehensive Metabolic Panel    Collection Time: 04/16/25 12:03 PM   Result Value Ref Range    Sodium 140 136 - 145 mmol/L

## 2025-04-17 ENCOUNTER — TELEPHONE (OUTPATIENT)
Age: 50
End: 2025-04-17

## 2025-04-21 NOTE — ED PROVIDER NOTES
tablet by mouth 3 times daily Take 1 tablet by mouth three times daily as needed for muscle spasm     Cholecalciferol 50 MCG (2000 UT) Tabs     cyanocobalamin 1000 MCG tablet     estradiol 0.1 MG/GM vaginal cream  Commonly known as: ESTRACE     estradiol 0.5 MG tablet  Commonly known as: ESTRACE     Ibsrela 50 MG Tabs  Generic drug: Tenapanor HCl     insulin lispro (1 Unit Dial) 100 UNIT/ML Sopn  Commonly known as: HUMALOG/ADMELOG     Kesimpta 20 MG/0.4ML Soaj  Generic drug: Ofatumumab  Inject 20 mg into the skin every 30 days     OneTouch Ultra strip  Generic drug: blood glucose test strips     Sodium Fluoride 5000 PPM 1.1 % Pste  Generic drug: Sodium Fluoride     Trulicity 1.5 MG/0.5ML SC injection  Generic drug: dulaglutide  Inject 0.5 mLs into the skin once a week     valACYclovir 1 g tablet  Commonly known as: VALTREX     Vitamin K2 100 MCG Caps               Where to Get Your Medications        These medications were sent to [facility] [address] [place] [address] - P [phone] - F [phone]  [address] [place] [id number]      Phone: [phone]   amoxicillin-clavulanate 875-125 MG per tablet  doxycycline hyclate 100 MG tablet  ibuprofen 600 MG tablet           DISCONTINUED MEDICATIONS:  Discharge Medication List as of 4/16/2025  2:03 PM          I am the Primary Clinician of Record. POLINA Doyle NP (electronically signed)    (Please note that parts of this dictation were completed with voice recognition software. Quite often unanticipated grammatical, syntax, homophones, and other interpretive errors are inadvertently transcribed by the computer software. Please disregards these errors. Please excuse any errors that have escaped final proofreading.)     Patience Khanna APRN - NP  04/21/25 8727

## 2025-04-30 ENCOUNTER — TELEMEDICINE (OUTPATIENT)
Age: 50
End: 2025-04-30
Payer: COMMERCIAL

## 2025-04-30 DIAGNOSIS — R26.9 ABNORMALITY OF GAIT AND MOBILITY: ICD-10-CM

## 2025-04-30 DIAGNOSIS — G35 RELAPSING REMITTING MULTIPLE SCLEROSIS (HCC): Primary | ICD-10-CM

## 2025-04-30 DIAGNOSIS — R53.1 WEAKNESS: ICD-10-CM

## 2025-04-30 PROCEDURE — 99214 OFFICE O/P EST MOD 30 MIN: CPT | Performed by: NURSE PRACTITIONER

## 2025-04-30 NOTE — PROGRESS NOTES
[facility]  [facility]  NEUROLOGY CLINIC   [address] [place] [address]   [phone] Office   [phone] Fax           Date:  25     Name:  GRIFFIN CHIANG  :  1975  MRN:  [id number]     PCP:  Mohit Shafer DO    Griffin Chiang is a 49 y.o. female who was seen by synchronous (real-time) audio-video technology on 2025 for Multiple Sclerosis    Subjective:   At this point, she continues to be off of DMT due to insurance denial of the Kesimpta and having difficulty getting the medication through the patient assistance program. She has not been on any DMT since her diagnosis in .  In December, she did contact the office with increased complaints of numbness and tingling in her legs and feet, spots on her back that are numb, dragging the right foot more, and her entire right side felt weaker. She was sent for five days of IV solumedrol. Unfortunately, this did not seem to help. This is now ongoing but is not worse. Due to severe adhesions which was thought to be related to endometriosis, she did have surgery and is recovering from this right now.     Recap from LV:  RRMS which is about the same. She has been doing a HEP to help with mobility and strength. She will start Kesimpta for DMT when this is available to her. Given her family history of autoimmune disease and description of symptoms could be related to this, she is undergoing a work up for this with rheumatology. Her rheumatologist is evaluating her lower back issues which sound orthopedic rather than MS related especially with DDD findings noted on a recent CT. We discussed her shoulder issues which also seem orthopedic given that this pain is activity/movement dependent and has been ongoing since she has had previous injury and has been worsening over time. Will assess with MRI of the right shoulder. Follow up in three months    Current Outpatient

## 2025-05-02 NOTE — TELEPHONE ENCOUNTER
Kesimpta follow up sent to  re: Appeal    Asked  to follow up next week as it will be close to the 30 day decision

## 2025-05-05 DIAGNOSIS — G35 RELAPSING REMITTING MULTIPLE SCLEROSIS (HCC): ICD-10-CM

## 2025-05-05 RX ORDER — BACLOFEN 20 MG/1
20 TABLET ORAL 3 TIMES DAILY PRN
Qty: 90 TABLET | Refills: 0 | Status: SHIPPED | OUTPATIENT
Start: 2025-05-05

## 2025-05-08 ENCOUNTER — TELEPHONE (OUTPATIENT)
Age: 50
End: 2025-05-08

## 2025-05-08 DIAGNOSIS — G35 RELAPSING REMITTING MULTIPLE SCLEROSIS (HCC): Primary | ICD-10-CM

## 2025-05-08 NOTE — TELEPHONE ENCOUNTER
Re: Matthewta     I called [facility] at [phone] and s/w Melissa.  We reviewed the chart notes extensively and she reviewed what she had on her end.      Kesimpta has been denied twice in April of 2023 and again March 2025.      Same reasons for denial as in 2023:     Cindy,     Patient must try at least one of these:   Dimethyl Fumarate  Copaxone  Beta Seron  Or Avonex     AND an MRI Brain within the last 3 months.     Once that is met, then KeSimpta can be appealed or do a new re-start. There is no other option to obtain KeSimpta without and trial and fail of one of those meds and a recent MRI within last 3 months.     Total time on phone: 40 minutes.

## 2025-05-13 ENCOUNTER — TRANSCRIBE ORDERS (OUTPATIENT)
Facility: HOSPITAL | Age: 50
End: 2025-05-13

## 2025-05-13 DIAGNOSIS — M79.661 PAIN IN RIGHT LOWER LEG: Primary | ICD-10-CM

## 2025-05-14 ENCOUNTER — TELEPHONE (OUTPATIENT)
Age: 50
End: 2025-05-14

## 2025-05-14 NOTE — TELEPHONE ENCOUNTER
The patient called asking for an updated physical therapy referral for MS. 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 She is asking for a physical therapy referral to be ordered for MS and pudendal neuralgia. Send to [facility] in [place].

## 2025-05-27 NOTE — H&P
weakness  Neuro: negative for headaches, dizziness, vertigo  Psych:  negative for feelings of anxiety, depression       Objective:   No data found.  No intake/output data recorded.  No intake/output data recorded.    EXAM:     NEURO-a&o   HEENT-wnl   LUNGS-clear    COR-regular rate and rhythym     ABD-soft , no tenderness, no rebound, good bs     EXT-no edema     Data Review     No results for input(s): \"WBC\", \"HGB\", \"HCT\", \"PLT\" in the last 72 hours.  No results for input(s): \"NA\", \"K\", \"CL\", \"CO2\", \"BUN\", \"GLU\", \"PHOS\" in the last 72 hours.    Invalid input(s): \"CREA\", \"CA\"  No results for input(s): \"TP\", \"GLOB\", \"GGT\" in the last 72 hours.    Invalid input(s): \"SGOT\", \"GPT\", \"AP\", \"TBIL\", \"ALB\", \"AML\", \"AMYP\", \"LPSE\", \"HLPSE\"  No results for input(s): \"INR\", \"APTT\" in the last 72 hours.    Invalid input(s): \"PTP\"    Patient Active Problem List   Diagnosis    Pelvic pain    Abnormal mammogram    Ovarian mass, left    Multiple sclerosis (HCC)    TIA (transient ischemic attack)    Menopausal symptoms    Vulvodynia    Incomplete rotator cuff tear or rupture of right shoulder, not specified as traumatic    Tendinitis of right rotator cuff    Bicipital tendinitis of right shoulder    Arthritis of right acromioclavicular joint    History of vaginal hysterectomy      Assessment:     Epigastric pain, rectal pain   Plan:     Upper endoscopy and colonoscopy     Signed By: Layton Aldana MD     5/27/2025  4:26 PM

## 2025-05-28 ENCOUNTER — ANESTHESIA (OUTPATIENT)
Facility: HOSPITAL | Age: 50
End: 2025-05-28
Payer: COMMERCIAL

## 2025-05-28 ENCOUNTER — ANESTHESIA EVENT (OUTPATIENT)
Facility: HOSPITAL | Age: 50
End: 2025-05-28
Payer: COMMERCIAL

## 2025-05-28 ENCOUNTER — HOSPITAL ENCOUNTER (OUTPATIENT)
Facility: HOSPITAL | Age: 50
Setting detail: OUTPATIENT SURGERY
Discharge: HOME | End: 2025-05-28
Attending: INTERNAL MEDICINE | Admitting: INTERNAL MEDICINE
Payer: COMMERCIAL

## 2025-05-28 VITALS
HEART RATE: 68 BPM | OXYGEN SATURATION: 100 % | WEIGHT: 152.56 LBS | SYSTOLIC BLOOD PRESSURE: 102 MMHG | HEIGHT: 67 IN | BODY MASS INDEX: 23.94 KG/M2 | DIASTOLIC BLOOD PRESSURE: 68 MMHG | TEMPERATURE: 97.3 F | RESPIRATION RATE: 16 BRPM

## 2025-05-28 LAB
GLUCOSE BLD STRIP.AUTO-MCNC: 90 MG/DL (ref 65–117)
SERVICE CMNT-IMP: NORMAL

## 2025-05-28 PROCEDURE — 2709999900 HC NON-CHARGEABLE SUPPLY: Performed by: INTERNAL MEDICINE

## 2025-05-28 PROCEDURE — 3600007512: Performed by: INTERNAL MEDICINE

## 2025-05-28 PROCEDURE — 6360000002 HC RX W HCPCS: Performed by: NURSE ANESTHETIST, CERTIFIED REGISTERED

## 2025-05-28 PROCEDURE — 7100000011 HC PHASE II RECOVERY - ADDTL 15 MIN: Performed by: INTERNAL MEDICINE

## 2025-05-28 PROCEDURE — 82962 GLUCOSE BLOOD TEST: CPT

## 2025-05-28 PROCEDURE — 7100000010 HC PHASE II RECOVERY - FIRST 15 MIN: Performed by: INTERNAL MEDICINE

## 2025-05-28 PROCEDURE — 88305 TISSUE EXAM BY PATHOLOGIST: CPT

## 2025-05-28 PROCEDURE — 2580000003 HC RX 258: Performed by: INTERNAL MEDICINE

## 2025-05-28 PROCEDURE — 3700000001 HC ADD 15 MINUTES (ANESTHESIA): Performed by: INTERNAL MEDICINE

## 2025-05-28 PROCEDURE — 2720000010 HC SURG SUPPLY STERILE: Performed by: INTERNAL MEDICINE

## 2025-05-28 PROCEDURE — 3700000000 HC ANESTHESIA ATTENDED CARE: Performed by: INTERNAL MEDICINE

## 2025-05-28 PROCEDURE — 3600007502: Performed by: INTERNAL MEDICINE

## 2025-05-28 RX ORDER — SODIUM CHLORIDE 0.9 % (FLUSH) 0.9 %
5-40 SYRINGE (ML) INJECTION EVERY 12 HOURS SCHEDULED
Status: DISCONTINUED | OUTPATIENT
Start: 2025-05-28 | End: 2025-05-28 | Stop reason: HOSPADM

## 2025-05-28 RX ORDER — LUBIPROSTONE 8 UG/1
8 CAPSULE ORAL DAILY
COMMUNITY

## 2025-05-28 RX ORDER — GLYCOPYRROLATE 0.2 MG/ML
INJECTION INTRAMUSCULAR; INTRAVENOUS
Status: DISCONTINUED | OUTPATIENT
Start: 2025-05-28 | End: 2025-05-28 | Stop reason: SDUPTHER

## 2025-05-28 RX ORDER — PROPOFOL 10 MG/ML
INJECTION, EMULSION INTRAVENOUS
Status: DISCONTINUED | OUTPATIENT
Start: 2025-05-28 | End: 2025-05-28 | Stop reason: SDUPTHER

## 2025-05-28 RX ORDER — LIDOCAINE HYDROCHLORIDE 20 MG/ML
INJECTION, SOLUTION EPIDURAL; INFILTRATION; INTRACAUDAL; PERINEURAL
Status: DISCONTINUED | OUTPATIENT
Start: 2025-05-28 | End: 2025-05-28 | Stop reason: SDUPTHER

## 2025-05-28 RX ORDER — PHENYLEPHRINE HCL IN 0.9% NACL 0.4MG/10ML
SYRINGE (ML) INTRAVENOUS
Status: DISCONTINUED | OUTPATIENT
Start: 2025-05-28 | End: 2025-05-28 | Stop reason: SDUPTHER

## 2025-05-28 RX ORDER — SODIUM CHLORIDE 9 MG/ML
INJECTION, SOLUTION INTRAVENOUS PRN
Status: DISCONTINUED | OUTPATIENT
Start: 2025-05-28 | End: 2025-05-28 | Stop reason: HOSPADM

## 2025-05-28 RX ORDER — SODIUM CHLORIDE 0.9 % (FLUSH) 0.9 %
5-40 SYRINGE (ML) INJECTION PRN
Status: DISCONTINUED | OUTPATIENT
Start: 2025-05-28 | End: 2025-05-28 | Stop reason: HOSPADM

## 2025-05-28 RX ADMIN — PROPOFOL 50 MG: 10 INJECTION, EMULSION INTRAVENOUS at 11:20

## 2025-05-28 RX ADMIN — Medication 80 MCG: at 11:41

## 2025-05-28 RX ADMIN — GLYCOPYRROLATE 0.2 MG: 0.2 INJECTION INTRAMUSCULAR; INTRAVENOUS at 11:14

## 2025-05-28 RX ADMIN — PROPOFOL 25 MG: 10 INJECTION, EMULSION INTRAVENOUS at 11:22

## 2025-05-28 RX ADMIN — SODIUM CHLORIDE: 9 INJECTION, SOLUTION INTRAVENOUS at 11:14

## 2025-05-28 RX ADMIN — LIDOCAINE HYDROCHLORIDE 50 MG: 20 INJECTION, SOLUTION EPIDURAL; INFILTRATION; INTRACAUDAL; PERINEURAL at 11:17

## 2025-05-28 RX ADMIN — Medication 80 MCG: at 11:45

## 2025-05-28 RX ADMIN — PROPOFOL 50 MG: 10 INJECTION, EMULSION INTRAVENOUS at 11:19

## 2025-05-28 RX ADMIN — PROPOFOL 25 MG: 10 INJECTION, EMULSION INTRAVENOUS at 11:25

## 2025-05-28 RX ADMIN — PROPOFOL 20 MG: 10 INJECTION, EMULSION INTRAVENOUS at 11:46

## 2025-05-28 RX ADMIN — PROPOFOL 25 MG: 10 INJECTION, EMULSION INTRAVENOUS at 11:38

## 2025-05-28 RX ADMIN — PROPOFOL 25 MG: 10 INJECTION, EMULSION INTRAVENOUS at 11:36

## 2025-05-28 RX ADMIN — PROPOFOL 25 MG: 10 INJECTION, EMULSION INTRAVENOUS at 11:30

## 2025-05-28 RX ADMIN — PROPOFOL 25 MG: 10 INJECTION, EMULSION INTRAVENOUS at 11:27

## 2025-05-28 RX ADMIN — PROPOFOL 25 MG: 10 INJECTION, EMULSION INTRAVENOUS at 11:34

## 2025-05-28 RX ADMIN — PROPOFOL 25 MG: 10 INJECTION, EMULSION INTRAVENOUS at 11:40

## 2025-05-28 RX ADMIN — Medication 80 MCG: at 11:49

## 2025-05-28 RX ADMIN — PROPOFOL 25 MG: 10 INJECTION, EMULSION INTRAVENOUS at 11:23

## 2025-05-28 RX ADMIN — PROPOFOL 25 MG: 10 INJECTION, EMULSION INTRAVENOUS at 11:32

## 2025-05-28 RX ADMIN — PROPOFOL 20 MG: 10 INJECTION, EMULSION INTRAVENOUS at 11:44

## 2025-05-28 RX ADMIN — PROPOFOL 20 MG: 10 INJECTION, EMULSION INTRAVENOUS at 11:42

## 2025-05-28 RX ADMIN — Medication 80 MCG: at 11:37

## 2025-05-28 RX ADMIN — PROPOFOL 20 MG: 10 INJECTION, EMULSION INTRAVENOUS at 11:48

## 2025-05-28 ASSESSMENT — PAIN SCALES - GENERAL: PAINLEVEL_OUTOF10: 0

## 2025-05-28 ASSESSMENT — PAIN - FUNCTIONAL ASSESSMENT: PAIN_FUNCTIONAL_ASSESSMENT: 0-10

## 2025-05-28 ASSESSMENT — LIFESTYLE VARIABLES: SMOKING_STATUS: 1

## 2025-05-28 NOTE — OP NOTE
Operative Note      Patient: Marlene Yun  YOB: 1975  MRN: [id number]    Date of Procedure: 2025    Pre-Op Diagnosis Codes:      * Irritable bowel syndrome with constipation [K58.1]     * Nausea and vomiting, unspecified vomiting type [R11.2]    [facility]  Layton Aldana MD  [phone]           2025                EGD Operative Report  Marlene Yun  :  1975  [facility] Medical Record Number:  [id number]      Indication: nausea, dysphagia, dyspepsia    : Layton Aldana MD    Referring Provider:  Mohit Shafer DO    Anesthesia/Sedation:  MAC    Airway assessment: No airway problems anticipated    Pre-Procedural Exam:      Airway: clear, no airway problems anticipated  Heart: RRR, without gallops or rubs  Lungs: clear bilaterally without wheezes, crackles, or rhonchi  Abdomen: soft, nontender, nondistended, bowel sounds present  Mental Status: awake, alert and oriented to person, place and time       Procedure Details     After infomed consent was obtained for the procedure, with all risks and benefits of procedure explained the patient was taken to the endoscopy suite and placed in the left lateral decubitus position.  Following sequential administration of sedation as per above, the endoscope was inserted into the mouth and advanced under direct vision to second portion of the duodenum.  A careful inspection was made as the gastroscope was withdrawn, including a retroflexed view of the proximal stomach; findings and interventions are described below.      Findings:   Esophagus: Z line regular at 39cm from the incisors.  There is a mild stenosis (17mm diameter).  Dilated using TTS 18-19-20mm balloon to 20mm with mild effect.  Biopsies obtained in the esophagus.  Stomach: 2cm hiatal hernia.  Antral predominant gastritis.  Biopsied.  Duodenum: Duodenitis.  Benign appearing submucosal nodule, likely lipoma by pillow sign.  Biopsies

## 2025-05-28 NOTE — DISCHARGE INSTRUCTIONS
[facility]  Layton Aldana M.D.  [phone]                 COLON and EGD DISCHARGE INSTRUCTIONS    2025    Marlene Yun  :  1975  [facility] Medical Record Number:  [id number]    Your endoscopy showed a narrowing which was stretched and inflammation of the stomach.   Your colonoscopy showed 2 polyps, diverticulosis, and mild inflammation in the sigmoid colon which was biopsied.    DISCOMFORT:  Sore throat- throat lozenges or warm salt water gargle  Redness at IV site- apply warm compress to area; if redness or soreness persist- contact your physician  There may be a slight amount of blood passed from the rectum  Gaseous discomfort- walking, belching will help relieve any discomfort  You may not operate a vehicle for 12 hours  You may not engage in an occupation involving machinery or appliances for rest of today  You may not drink alcoholic beverages for at least 12 hours  Avoid making any critical decisions for at least 24 hour  DIET:   Advance diet as tolerated.   - however -  remember your colon is empty and a heavy meal will produce gas.   Avoid these foods:  vegetables, fried / greasy foods, carbonated drinks for today     ACTIVITY:  You may  resume your normal daily activities it is recommended that you spend the remainder of the day resting -  avoid any strenuous activity and driving.    CALL MJESSICA  ANY SIGN OF:   Increasing pain, nausea, vomiting  Abdominal distension (swelling)  New increased bleeding (oral or rectal)  Fever (chills)  Pain in chest area  Bloody discharge from nose or mouth  Shortness of breath      Follow-up Instructions:   Call Dr. Aldana if any questions at [phone].  Results of procedure / biopsy in 7 to 10 days, we will call you with these results.

## 2025-05-28 NOTE — OP NOTE
Operative Note      Patient: Marlene Yun  YOB: 1975  MRN: [id number]    Date of Procedure: 2025    Pre-Op Diagnosis Codes:      * Irritable bowel syndrome with constipation [K58.1]     * Nausea and vomiting, unspecified vomiting type [R11.2]      [facility]  Layton Aldana MD  [phone]            2025          Colonoscopy Operative Report  Marlene Yun  :  1975  [facility] Medical Record Number:  [id number]      Indications:  change in bowel habits, personal history of colon polyps    :  Layton Aldana MD    Referring Provider: Mohit Shafer DO    Sedation:  MAC    Pre-Procedural Exam:      Airway: clear,  No airway problems anticipated  Heart: RRR, without gallops or rubs  Lungs: clear bilaterally without wheezes, crackles, or rhonchi  Abdomen: soft, nontender, nondistended, bowel sounds present  Mental Status: awake, alert and oriented to person, place and time     Procedure Details:  After informed consent was obtained with all risks and benefits of procedure explained and preoperative exam completed, the patient was taken to the endoscopy suite and placed in the left lateral decubitus position.  Upon sequential sedation as per above, a digital rectal exam was performed. The Olympus videocolonoscope  was inserted in the rectum and carefully advanced to the terminal ileum.  The quality of preparation was adequate.  The colonoscope was slowly withdrawn with careful inspection and evaluation between folds. Retroflexion in the rectum was performed.  The patient tolerated the procedure well.    Findings:   Terminal Ileum: Normal  Cecum: Normal  Ascending Colon: Normal  Transverse Colon: Few diverticulae.  Descending Colon: Normal  Sigmoid: Diverticulosis with mild surrounding inflammation.  Biopsied. 2 6mm sessile polyps.  Resected with cold snare and retrieved.  Rectum:  Internal hemorrhoids.    Multiple pull throughs were done

## 2025-05-28 NOTE — PROGRESS NOTES
Received recovery report from anesthesia team, see anesthesia note. Abdomen remains soft and non-tender post-procedure. Pt has no complaints at this time and tolerated procedure well. Endoscope was pre-cleaned at the bedside by Rosendo Longoria immediately following procedure. Post recovery report given to Deb Tony.

## 2025-05-28 NOTE — ANESTHESIA PRE PROCEDURE
Department of Anesthesiology  Preprocedure Note       Name:  Marlene Yun   Age:  49 y.o.  :  1975                                          MRN:  [id number]         Date:  2025      Surgeon: Surgeon(s):  Layton Aldana MD    Procedure: Procedure(s):  COLONOSCOPY  ESOPHAGOGASTRODUODENOSCOPY    Medications prior to admission:   Prior to Admission medications    Medication Sig Start Date End Date Taking? Authorizing Provider   lubiprostone (AMITIZA) 8 MCG CAPS capsule Take 1 capsule by mouth daily   Yes Sriram Heaton MD   baclofen (LIORESAL) 20 MG tablet Take 1 tablet by mouth three times daily as needed for muscle spasm 25  Yes Cindy Gibson APRN - NP   valACYclovir (VALTREX) 1 g tablet  25  Yes Sriram Heaton MD   ALPRAZolam (XANAX) 1 MG tablet Take 1 tablet by mouth as needed for Anxiety.   Yes Automatic Reconciliation, Ar   Interferon Beta-1b (BETASERON) 0.3 MG injection Inject 62.5 mcg into the skin every other day for 14 days, THEN 125 mcg every other day for 14 days, THEN 187.5 mcg every other day for 14 days, THEN 250 mcg every other day. 0.625.  Patient not taking: Reported on 2025  Cindy Gibson APRN - NP   ibuprofen (ADVIL;MOTRIN) 600 MG tablet Take 1 tablet by mouth every 6 hours as needed for Pain 25   Patience Khanna APRN - NP   dulaglutide (TRULICITY) 1.5 MG/0.5ML SC injection Inject 0.5 mLs into the skin once a week 3/20/25   Desirae Arce MD   estradiol (ESTRACE) 0.1 MG/GM vaginal cream INSERT 1 GM BY VAGINA ROUTE 3 TIMES A WEEK 25   Sriram Heaton MD   estradiol (ESTRACE) 0.5 MG tablet Take 1 tablet by mouth daily  Patient not taking: Reported on 2025   Sriram Heaton MD   Ofatumumab (KESIMPTA) 20 MG/0.4ML SOAJ Inject 20 mg into the skin every 30 days  Patient not taking: Reported on 2025   Arthur, Cindy, APRN - NP   SODIUM FLUORIDE 5000 PPM 1.1 % PSTE USE PEA SIZE AMOUNT TWICE A

## 2025-05-28 NOTE — ANESTHESIA POSTPROCEDURE EVALUATION
Department of Anesthesiology  Postprocedure Note    Patient: Marlene Yun  MRN: [id number]  YOB: 1975  Date of evaluation: 5/28/2025    Procedure Summary       Date: 05/28/25 Room / Location: [facility] ENDO 03 / [facility] ENDOSCOPY    Anesthesia Start: 1114 Anesthesia Stop: 1154    Procedures:       COLONOSCOPY (Lower GI Region)      ESOPHAGOGASTRODUODENOSCOPY (Upper GI Region)      ESOPHAGOGASTRODUODENOSCOPY BIOPSY (Upper GI Region)      ESOPHAGOGASTRODUODENOSCOPY DILATION BALLOON (Upper GI Region)      COLONOSCOPY BIOPSY (Lower GI Region) Diagnosis:       Irritable bowel syndrome with constipation      Nausea and vomiting, unspecified vomiting type      (Irritable bowel syndrome with constipation [K58.1])      (Nausea and vomiting, unspecified vomiting type [R11.2])    Surgeons: Layton Aldana MD Responsible Provider: Trinity Gonzáles MD    Anesthesia Type: MAC ASA Status: 2            Anesthesia Type: MAC    Cj Phase I: Cj Score: 10    Cj Phase II: Cj Score: 10    Anesthesia Post Evaluation    Patient location during evaluation: PACU  Patient participation: complete - patient participated  Level of consciousness: awake  Airway patency: patent  Nausea & Vomiting: no vomiting and no nausea  Cardiovascular status: hemodynamically stable  Respiratory status: acceptable  Hydration status: stable  Pain management: adequate    No notable events documented.

## 2025-06-06 ENCOUNTER — HOSPITAL ENCOUNTER (OUTPATIENT)
Facility: HOSPITAL | Age: 50
Discharge: HOME | End: 2025-06-08
Payer: COMMERCIAL

## 2025-06-06 DIAGNOSIS — M79.661 PAIN IN RIGHT LOWER LEG: ICD-10-CM

## 2025-06-06 PROCEDURE — 93922 UPR/L XTREMITY ART 2 LEVELS: CPT

## 2025-06-07 LAB
VAS LEFT ABI: 1.1
VAS LEFT ARM BP: 109 MMHG
VAS LEFT DORSALIS PEDIS BP: 122 MMHG
VAS LEFT PTA BP: 122 MMHG
VAS LEFT TBI: 0.83
VAS LEFT TOE PRESSURE: 92 MMHG
VAS RIGHT ABI: 1.14
VAS RIGHT ARM BP: 111 MMHG
VAS RIGHT DORSALIS PEDIS BP: 105 MMHG
VAS RIGHT PTA BP: 126 MMHG
VAS RIGHT TBI: 0.97
VAS RIGHT TOE PRESSURE: 108 MMHG

## 2025-06-07 PROCEDURE — 93922 UPR/L XTREMITY ART 2 LEVELS: CPT | Performed by: SURGERY

## 2025-06-11 ENCOUNTER — TELEPHONE (OUTPATIENT)
Age: 50
End: 2025-06-11

## 2025-06-24 ENCOUNTER — TELEMEDICINE (OUTPATIENT)
Age: 50
End: 2025-06-24
Payer: COMMERCIAL

## 2025-06-24 DIAGNOSIS — R26.9 ABNORMALITY OF GAIT AND MOBILITY: ICD-10-CM

## 2025-06-24 DIAGNOSIS — R53.1 WEAKNESS: ICD-10-CM

## 2025-06-24 DIAGNOSIS — G35 RELAPSING REMITTING MULTIPLE SCLEROSIS (HCC): Primary | ICD-10-CM

## 2025-06-24 DIAGNOSIS — M62.838 MUSCLE SPASTICITY: ICD-10-CM

## 2025-06-24 PROBLEM — M79.661 PAIN IN RIGHT LOWER LEG: Status: ACTIVE | Noted: 2025-06-24

## 2025-06-24 PROCEDURE — 99214 OFFICE O/P EST MOD 30 MIN: CPT | Performed by: NURSE PRACTITIONER

## 2025-06-24 RX ORDER — TERBINAFINE HYDROCHLORIDE 250 MG/1
250 TABLET ORAL DAILY
COMMUNITY
Start: 2025-05-13

## 2025-06-24 RX ORDER — BACLOFEN 20 MG/1
20 TABLET ORAL 3 TIMES DAILY PRN
Qty: 90 TABLET | Refills: 1 | Status: SHIPPED | OUTPATIENT
Start: 2025-06-24

## 2025-06-24 NOTE — PROGRESS NOTES
[facility]  [facility]UROLOGY [facility]INIC   [address]   [place] [address]   [phone] Office   [phone] Fax           Date:  25     Name:  GRIFFIN CHIANG  :  1975  MRN:  [id number]     PCP:  Mohit Shafer DO    Griffin Chiang is a 49 y.o. female who was seen by synchronous (real-time) audio-video technology on 2025 for Multiple Sclerosis    Subjective:   At this point, she has finally been able to start the Kesimpta. Her first dose was just this past Friday. It has been approved and her maintenance prescription is already scheduled for delivery through [facility]. She has been able to  the right foot more in the last couple of days. Her boyfriend had also noted the change. She is not dragging the leg and the weakness is better. She still has some mild balance issues. She does have the fatigue but this does not seem as bad. The only real issue she has had with the Kesimpta is that she did develop a headache which is not as prominent than it was when she first did the injection. The morning after the injection she woke up and felt like she was not able to move that well but this resolved within hours. The only other thing thing is that her blood sugars seem to have been a little higher than normal.      Recap from LV:  RRMS continues to be associated with balance and mobility issues and weakness. This seems a bit worse since her recent exacerbation despite treatment with the IV steroids.  Unfortunately, she still has not been able to start a DMT due to insurance denial of the Kesimpta and issues with getting it through the patient assistance program. We discussed her care moving forward and the medications that would be covered by her insurance. Will try to start her on Betaseron. Purpose and potential side effects were discussed and she has verbalized understanding. She does have a recent CBC and CMP

## 2025-07-07 ENCOUNTER — OFFICE VISIT (OUTPATIENT)
Age: 50
End: 2025-07-07
Payer: COMMERCIAL

## 2025-07-07 VITALS
OXYGEN SATURATION: 96 % | WEIGHT: 152.2 LBS | HEIGHT: 67 IN | HEART RATE: 83 BPM | DIASTOLIC BLOOD PRESSURE: 72 MMHG | BODY MASS INDEX: 23.89 KG/M2 | TEMPERATURE: 97.8 F | SYSTOLIC BLOOD PRESSURE: 111 MMHG | RESPIRATION RATE: 16 BRPM

## 2025-07-07 DIAGNOSIS — E11.65 TYPE 2 DIABETES MELLITUS WITH HYPERGLYCEMIA, WITH LONG-TERM CURRENT USE OF INSULIN (HCC): ICD-10-CM

## 2025-07-07 DIAGNOSIS — E11.65 TYPE 2 DIABETES MELLITUS WITH HYPERGLYCEMIA, WITH LONG-TERM CURRENT USE OF INSULIN (HCC): Primary | ICD-10-CM

## 2025-07-07 DIAGNOSIS — Z79.4 TYPE 2 DIABETES MELLITUS WITH HYPERGLYCEMIA, WITH LONG-TERM CURRENT USE OF INSULIN (HCC): ICD-10-CM

## 2025-07-07 DIAGNOSIS — Z79.4 TYPE 2 DIABETES MELLITUS WITH HYPERGLYCEMIA, WITH LONG-TERM CURRENT USE OF INSULIN (HCC): Primary | ICD-10-CM

## 2025-07-07 LAB
GLUCOSE, POC: 145 MG/DL
HBA1C MFR BLD: 6 %

## 2025-07-07 PROCEDURE — 83036 HEMOGLOBIN GLYCOSYLATED A1C: CPT | Performed by: STUDENT IN AN ORGANIZED HEALTH CARE EDUCATION/TRAINING PROGRAM

## 2025-07-07 PROCEDURE — 3044F HG A1C LEVEL LT 7.0%: CPT | Performed by: STUDENT IN AN ORGANIZED HEALTH CARE EDUCATION/TRAINING PROGRAM

## 2025-07-07 PROCEDURE — 82962 GLUCOSE BLOOD TEST: CPT | Performed by: STUDENT IN AN ORGANIZED HEALTH CARE EDUCATION/TRAINING PROGRAM

## 2025-07-07 PROCEDURE — 99213 OFFICE O/P EST LOW 20 MIN: CPT | Performed by: STUDENT IN AN ORGANIZED HEALTH CARE EDUCATION/TRAINING PROGRAM

## 2025-07-07 RX ORDER — FOLIC ACID 1 MG/1
1 TABLET ORAL DAILY
COMMUNITY

## 2025-07-07 NOTE — PROGRESS NOTES
Have you been to the ER, urgent care clinic since your last visit?  Hospitalized since your last visit?   NO    Have you seen or consulted any other health care providers outside our system since your last visit?   YES - When: approximately 2 months ago.  Where and Why: [facility] and Dr. Ocampo-foot specialist.      “Have you had a diabetic eye exam?”    NO     No diabetic eye exam on file       A1C 6.0    Chief Complaint   Patient presents with    Establish Care    Diabetes     /72 (BP Site: Left Upper Arm, Patient Position: Sitting, BP Cuff Size: Medium Adult)   Pulse 83   Temp 97.8 °F (36.6 °C) (Temporal)   Resp 16   Ht 1.702 m (5' 7\")   Wt 69 kg (152 lb 3.2 oz)   SpO2 96%   BMI 23.84 kg/m²

## 2025-07-07 NOTE — PROGRESS NOTES
[facility]                                                                          Patient Information  Date:2025  Name : Marlene Yun 49 y.o.     YOB: 1975         Referred by: Mohit Shafer DO       Chief Complaint   Patient presents with    Establish Care    Diabetes       History of Present Illness: Marleen Yun is a 49 y.o. female with MS,  type 2 DM who presented to follow up     : establishing care with me, was following Dr Arce ; doing well otherwise     3-2025 presents for follow up. Reports doing well. Awaiting ms medication     24 presents for follow up.      Type 2 diabetes mellitus    Glucometer reading:  Results for orders placed or performed in visit on 25   AMB POC HEMOGLOBIN A1C   Result Value Ref Range    Hemoglobin A1C, POC 6.0 %   AMB POC GLUCOSE BLOOD, BY GLUCOSE MONITORING DEVICE   Result Value Ref Range    Glucose,  MG/DL       · Diagnosis:  · Family history of diabetes Mellitus : father, his mom,  · Current treatment: Metformin 500 mg BID  · Past treatment: None   · Glucose checks forgot Meter   · Hyperglycemia: yes   · Hypoglycemia: no   · Meals per day: 3  ---Breakfast :no   ----Lunch :no  ----Dinner: tacos-meat, cheese ,lettuce, 3 of them, water   -----Snacks: protein pudding , protein shakes   -----Drinks: water   · Exercise: none   Steroids:yes steroids for MS  · DM related hospitalizations:   No    Smokin/2 PPD   Family history of coronary artery disease/stroke:  [id number]  Complications of DM:  · CAD: no  · CVA: no  · PVD: no  · Amputations: no   · Retinopathy: 4 months ago   · Gastropathy: no  · Nephropathy: no  · Neuropathy: no   Sees podiatrist:    Medications:  · Statin: no  · ACE-I: no  · ASA: no    · Diabetes education:no     Of note patient reports heat intolerance.  Also reports weight gain   Past Medical History:   Diagnosis Date    Anxiety     Diabetes mellitus (HCC)

## 2025-07-10 ENCOUNTER — HOSPITAL ENCOUNTER (INPATIENT)
Facility: HOSPITAL | Age: 50
LOS: 1 days | Discharge: HOME | DRG: 043 | End: 2025-07-11
Payer: COMMERCIAL

## 2025-07-10 ENCOUNTER — APPOINTMENT (OUTPATIENT)
Facility: HOSPITAL | Age: 50
DRG: 043 | End: 2025-07-10
Payer: COMMERCIAL

## 2025-07-10 DIAGNOSIS — M54.50 LUMBAR PAIN: ICD-10-CM

## 2025-07-10 DIAGNOSIS — G35 MULTIPLE SCLEROSIS EXACERBATION (HCC): Primary | ICD-10-CM

## 2025-07-10 LAB
ALBUMIN SERPL-MCNC: 3.8 G/DL (ref 3.5–5)
ALBUMIN/GLOB SERPL: 1.1 (ref 1.1–2.2)
ALP SERPL-CCNC: 103 U/L (ref 45–117)
ALT SERPL-CCNC: 25 U/L (ref 12–78)
ANION GAP SERPL CALC-SCNC: 6 MMOL/L (ref 2–12)
APPEARANCE UR: ABNORMAL
AST SERPL W P-5'-P-CCNC: 10 U/L (ref 15–37)
BACTERIA URNS QL MICRO: NEGATIVE /HPF
BASOPHILS # BLD: 0 K/UL (ref 0–0.1)
BASOPHILS NFR BLD: 0 % (ref 0–1)
BILIRUB SERPL-MCNC: 0.2 MG/DL (ref 0.2–1)
BILIRUB UR QL: NEGATIVE
BUN SERPL-MCNC: 12 MG/DL (ref 6–20)
BUN/CREAT SERPL: 16 (ref 12–20)
CA-I BLD-MCNC: 10 MG/DL (ref 8.5–10.1)
CHLORIDE SERPL-SCNC: 108 MMOL/L (ref 97–108)
CO2 SERPL-SCNC: 26 MMOL/L (ref 21–32)
COLOR UR: ABNORMAL
CREAT SERPL-MCNC: 0.75 MG/DL (ref 0.55–1.02)
CRP SERPL-MCNC: <0.29 MG/DL (ref 0–0.3)
DIFFERENTIAL METHOD BLD: ABNORMAL
EOSINOPHIL # BLD: 0.84 K/UL (ref 0–0.4)
EOSINOPHIL NFR BLD: 4 % (ref 0–7)
EPITH CASTS URNS QL MICRO: ABNORMAL /LPF
ERYTHROCYTE [DISTWIDTH] IN BLOOD BY AUTOMATED COUNT: 13.1 % (ref 11.5–14.5)
GLOBULIN SER CALC-MCNC: 3.4 G/DL (ref 2–4)
GLUCOSE BLD STRIP.AUTO-MCNC: 109 MG/DL (ref 65–100)
GLUCOSE BLD STRIP.AUTO-MCNC: 118 MG/DL (ref 65–100)
GLUCOSE BLD STRIP.AUTO-MCNC: 131 MG/DL (ref 65–100)
GLUCOSE SERPL-MCNC: 110 MG/DL (ref 65–100)
GLUCOSE UR STRIP.AUTO-MCNC: NEGATIVE MG/DL
HCT VFR BLD AUTO: 42.5 % (ref 35–47)
HGB BLD-MCNC: 14.1 G/DL (ref 11.5–16)
HGB UR QL STRIP: NEGATIVE
IMM GRANULOCYTES # BLD AUTO: 0 K/UL
IMM GRANULOCYTES NFR BLD AUTO: 0 %
KETONES UR QL STRIP.AUTO: NEGATIVE MG/DL
LEUKOCYTE ESTERASE UR QL STRIP.AUTO: NEGATIVE
LYMPHOCYTES # BLD: 4.39 K/UL (ref 0.8–3.5)
LYMPHOCYTES NFR BLD: 21 % (ref 12–49)
MCH RBC QN AUTO: 28.8 PG (ref 26–34)
MCHC RBC AUTO-ENTMCNC: 33.2 G/DL (ref 30–36.5)
MCV RBC AUTO: 86.9 FL (ref 80–99)
MONOCYTES # BLD: 1.46 K/UL (ref 0–1)
MONOCYTES NFR BLD: 7 % (ref 5–13)
MUCOUS THREADS URNS QL MICRO: ABNORMAL /LPF
NEUTS BAND NFR BLD MANUAL: 1 % (ref 0–6)
NEUTS SEG # BLD: 14.21 K/UL (ref 1.8–8)
NEUTS SEG NFR BLD: 67 % (ref 32–75)
NITRITE UR QL STRIP.AUTO: NEGATIVE
NRBC # BLD: 0 K/UL (ref 0–0.01)
NRBC BLD-RTO: 0 PER 100 WBC
PERFORMED BY:: ABNORMAL
PERIPHERAL SMEAR, MD REVIEW: NORMAL
PH UR STRIP: 5 (ref 5–8)
PLATELET # BLD AUTO: 301 K/UL (ref 150–400)
PMV BLD AUTO: 11.4 FL (ref 8.9–12.9)
POTASSIUM SERPL-SCNC: 3.8 MMOL/L (ref 3.5–5.1)
PROCALCITONIN SERPL-MCNC: <0.05 NG/ML
PROT SERPL-MCNC: 7.2 G/DL (ref 6.4–8.2)
PROT UR STRIP-MCNC: NEGATIVE MG/DL
RBC # BLD AUTO: 4.89 M/UL (ref 3.8–5.2)
RBC #/AREA URNS HPF: ABNORMAL /HPF (ref 0–5)
RBC MORPH BLD: ABNORMAL
SODIUM SERPL-SCNC: 140 MMOL/L (ref 136–145)
SP GR UR REFRACTOMETRY: 1.01 (ref 1–1.03)
URINE CULTURE IF INDICATED: ABNORMAL
UROBILINOGEN UR QL STRIP.AUTO: 0.1 EU/DL (ref 0.1–1)
WBC # BLD AUTO: 20.9 K/UL (ref 3.6–11)
WBC URNS QL MICRO: ABNORMAL /HPF (ref 0–4)

## 2025-07-10 PROCEDURE — 93005 ELECTROCARDIOGRAM TRACING: CPT | Performed by: PHYSICIAN ASSISTANT

## 2025-07-10 PROCEDURE — 71045 X-RAY EXAM CHEST 1 VIEW: CPT

## 2025-07-10 PROCEDURE — 2500000003 HC RX 250 WO HCPCS

## 2025-07-10 PROCEDURE — 36415 COLL VENOUS BLD VENIPUNCTURE: CPT

## 2025-07-10 PROCEDURE — 6370000000 HC RX 637 (ALT 250 FOR IP)

## 2025-07-10 PROCEDURE — 86140 C-REACTIVE PROTEIN: CPT

## 2025-07-10 PROCEDURE — 70450 CT HEAD/BRAIN W/O DYE: CPT

## 2025-07-10 PROCEDURE — 82962 GLUCOSE BLOOD TEST: CPT

## 2025-07-10 PROCEDURE — 99285 EMERGENCY DEPT VISIT HI MDM: CPT

## 2025-07-10 PROCEDURE — 84484 ASSAY OF TROPONIN QUANT: CPT

## 2025-07-10 PROCEDURE — A9577 INJ MULTIHANCE: HCPCS

## 2025-07-10 PROCEDURE — 72156 MRI NECK SPINE W/O & W/DYE: CPT

## 2025-07-10 PROCEDURE — 93005 ELECTROCARDIOGRAM TRACING: CPT

## 2025-07-10 PROCEDURE — 1100000000 HC RM PRIVATE

## 2025-07-10 PROCEDURE — 87040 BLOOD CULTURE FOR BACTERIA: CPT

## 2025-07-10 PROCEDURE — 80053 COMPREHEN METABOLIC PANEL: CPT

## 2025-07-10 PROCEDURE — 85025 COMPLETE CBC W/AUTO DIFF WBC: CPT

## 2025-07-10 PROCEDURE — 84145 PROCALCITONIN (PCT): CPT

## 2025-07-10 PROCEDURE — 6360000004 HC RX CONTRAST MEDICATION

## 2025-07-10 PROCEDURE — 70553 MRI BRAIN STEM W/O & W/DYE: CPT

## 2025-07-10 PROCEDURE — 81001 URINALYSIS AUTO W/SCOPE: CPT

## 2025-07-10 RX ORDER — LANOLIN ALCOHOL/MO/W.PET/CERES
1000 CREAM (GRAM) TOPICAL DAILY
Status: DISCONTINUED | OUTPATIENT
Start: 2025-07-10 | End: 2025-07-11 | Stop reason: HOSPADM

## 2025-07-10 RX ORDER — ACETAMINOPHEN 325 MG/1
650 TABLET ORAL EVERY 6 HOURS PRN
Status: DISCONTINUED | OUTPATIENT
Start: 2025-07-10 | End: 2025-07-11 | Stop reason: HOSPADM

## 2025-07-10 RX ORDER — ACETAMINOPHEN 650 MG/1
650 SUPPOSITORY RECTAL EVERY 6 HOURS PRN
Status: DISCONTINUED | OUTPATIENT
Start: 2025-07-10 | End: 2025-07-11 | Stop reason: HOSPADM

## 2025-07-10 RX ORDER — MAGNESIUM SULFATE IN WATER 40 MG/ML
2000 INJECTION, SOLUTION INTRAVENOUS PRN
Status: DISCONTINUED | OUTPATIENT
Start: 2025-07-10 | End: 2025-07-11 | Stop reason: HOSPADM

## 2025-07-10 RX ORDER — ONDANSETRON 4 MG/1
4 TABLET, ORALLY DISINTEGRATING ORAL EVERY 8 HOURS PRN
Status: DISCONTINUED | OUTPATIENT
Start: 2025-07-10 | End: 2025-07-11 | Stop reason: HOSPADM

## 2025-07-10 RX ORDER — LORAZEPAM 1 MG/1
1 TABLET ORAL PRN
Status: COMPLETED | OUTPATIENT
Start: 2025-07-10 | End: 2025-07-10

## 2025-07-10 RX ORDER — SODIUM CHLORIDE 0.9 % (FLUSH) 0.9 %
5-40 SYRINGE (ML) INJECTION EVERY 12 HOURS SCHEDULED
Status: DISCONTINUED | OUTPATIENT
Start: 2025-07-10 | End: 2025-07-11 | Stop reason: HOSPADM

## 2025-07-10 RX ORDER — SODIUM CHLORIDE 0.9 % (FLUSH) 0.9 %
5-40 SYRINGE (ML) INJECTION PRN
Status: DISCONTINUED | OUTPATIENT
Start: 2025-07-10 | End: 2025-07-11 | Stop reason: HOSPADM

## 2025-07-10 RX ORDER — SODIUM CHLORIDE 9 MG/ML
INJECTION, SOLUTION INTRAVENOUS PRN
Status: DISCONTINUED | OUTPATIENT
Start: 2025-07-10 | End: 2025-07-11 | Stop reason: HOSPADM

## 2025-07-10 RX ORDER — BACLOFEN 10 MG/1
20 TABLET ORAL 3 TIMES DAILY
Status: DISCONTINUED | OUTPATIENT
Start: 2025-07-10 | End: 2025-07-11 | Stop reason: HOSPADM

## 2025-07-10 RX ORDER — ENOXAPARIN SODIUM 100 MG/ML
40 INJECTION SUBCUTANEOUS DAILY
Status: DISCONTINUED | OUTPATIENT
Start: 2025-07-10 | End: 2025-07-11 | Stop reason: HOSPADM

## 2025-07-10 RX ORDER — INSULIN LISPRO 100 [IU]/ML
0-4 INJECTION, SOLUTION INTRAVENOUS; SUBCUTANEOUS
Status: DISCONTINUED | OUTPATIENT
Start: 2025-07-10 | End: 2025-07-11 | Stop reason: HOSPADM

## 2025-07-10 RX ORDER — POTASSIUM CHLORIDE 7.45 MG/ML
10 INJECTION INTRAVENOUS PRN
Status: DISCONTINUED | OUTPATIENT
Start: 2025-07-10 | End: 2025-07-11 | Stop reason: HOSPADM

## 2025-07-10 RX ORDER — POLYETHYLENE GLYCOL 3350 17 G/17G
17 POWDER, FOR SOLUTION ORAL DAILY PRN
Status: DISCONTINUED | OUTPATIENT
Start: 2025-07-10 | End: 2025-07-11 | Stop reason: HOSPADM

## 2025-07-10 RX ORDER — GLUCAGON 1 MG/ML
1 KIT INJECTION PRN
Status: DISCONTINUED | OUTPATIENT
Start: 2025-07-10 | End: 2025-07-11 | Stop reason: HOSPADM

## 2025-07-10 RX ORDER — ONDANSETRON 2 MG/ML
4 INJECTION INTRAMUSCULAR; INTRAVENOUS EVERY 6 HOURS PRN
Status: DISCONTINUED | OUTPATIENT
Start: 2025-07-10 | End: 2025-07-11 | Stop reason: HOSPADM

## 2025-07-10 RX ORDER — POTASSIUM CHLORIDE 1500 MG/1
40 TABLET, EXTENDED RELEASE ORAL PRN
Status: DISCONTINUED | OUTPATIENT
Start: 2025-07-10 | End: 2025-07-11 | Stop reason: HOSPADM

## 2025-07-10 RX ORDER — DEXTROSE MONOHYDRATE 100 MG/ML
INJECTION, SOLUTION INTRAVENOUS CONTINUOUS PRN
Status: DISCONTINUED | OUTPATIENT
Start: 2025-07-10 | End: 2025-07-11 | Stop reason: HOSPADM

## 2025-07-10 RX ORDER — VITAMIN B COMPLEX
1000 TABLET ORAL DAILY
Status: DISCONTINUED | OUTPATIENT
Start: 2025-07-10 | End: 2025-07-11 | Stop reason: HOSPADM

## 2025-07-10 RX ORDER — FOLIC ACID 1 MG/1
1 TABLET ORAL DAILY
Status: DISCONTINUED | OUTPATIENT
Start: 2025-07-10 | End: 2025-07-11 | Stop reason: HOSPADM

## 2025-07-10 RX ADMIN — GADOBENATE DIMEGLUMINE 14 ML: 529 INJECTION, SOLUTION INTRAVENOUS at 16:10

## 2025-07-10 RX ADMIN — BACLOFEN 20 MG: 10 TABLET ORAL at 16:54

## 2025-07-10 RX ADMIN — BACLOFEN 20 MG: 10 TABLET ORAL at 20:27

## 2025-07-10 RX ADMIN — Medication 1000 UNITS: at 14:13

## 2025-07-10 RX ADMIN — SODIUM CHLORIDE, PRESERVATIVE FREE 10 ML: 5 INJECTION INTRAVENOUS at 20:27

## 2025-07-10 RX ADMIN — LORAZEPAM 1 MG: 1 TABLET ORAL at 14:13

## 2025-07-10 RX ADMIN — FOLIC ACID 1 MG: 1 TABLET ORAL at 14:13

## 2025-07-10 ASSESSMENT — PAIN - FUNCTIONAL ASSESSMENT: PAIN_FUNCTIONAL_ASSESSMENT: 0-10

## 2025-07-10 ASSESSMENT — PAIN SCALES - GENERAL: PAINLEVEL_OUTOF10: 0

## 2025-07-10 NOTE — H&P
History & Physical    Primary Care Provider: Mohit Shafer DO  Source of Information: Patient/family     Chief complaint:   Chief Complaint   Patient presents with    Headache    Multiple Sclerosis        History of Presenting Illness:   Marlene Yun is a 49 y.o. female with multiple medical problems including multiple sclerosis, likely, fibromyalgia, diabetes mellitus, anxiety, persistent leukocytosis with new onset left-sided weakness.  Per patient she is ambulatory with rollator.  She has history of MS and.  For the past 2 weeks she has been experiencing headache, for past few days left-sided weakness and lower back pain worse with bending.  Initially she experiences right-sided weakness.  In the ED patient's vital signs were stable.  Lab workup significant for persistent leukocytosis.  CT head unremarkable for acute pathology but revealed old concern for demyelination as previously seen on MR 2024.  Teleneurology consulted, concern for acute MS flare, MRI head and C-spine with and without contrast ordered.       Review of Systems:  Pertinent items are noted in the History of Present Illness.     Past Medical History:   Diagnosis Date    Anxiety     Diabetes mellitus (HCC)     Fibromyalgia     Currently seeing a Neurologist to confirm diagnosis, MS, right arm pain    Ill-defined condition     Patient reports increased WBC's- has appointment with Oncologist    Migraines     MS (multiple sclerosis) (HCC)         Past Surgical History:   Procedure Laterality Date    ABDOMINAL ADHESION SURGERY  2025    CHOLECYSTECTOMY      COLONOSCOPY N/A 5/28/2025    COLONOSCOPY performed by Layton Aldana MD at [facility] ENDOSCOPY    COLONOSCOPY N/A 5/28/2025    COLONOSCOPY BIOPSY performed by Layton Aldana MD at [facility] ENDOSCOPY    FOOT SURGERY  June 2022    4th toe    GYN      hysterectomy    ORTHOPEDIC SURGERY      toe, bilateral big toes    OVARY REMOVAL Left     january 2022    OVARY REMOVAL Right     PARTIAL

## 2025-07-10 NOTE — ED PROVIDER NOTES
Given 7/10/25 1413)   gadobenate dimeglumine (MULTIHANCE) injection 14 mL (14 mLs IntraVENous Given 7/10/25 1610)       CONSULTS: See ED Course/MDM for further details.  IP CONSULT TO TELE-NEUROLOGY   PROCEDURES   Unless otherwise noted above, none  Procedures    SEPSIS REASSESSMENT & CRITICAL CARE TIME   SEPSIS REASSESSMENT: Patient does NOT meet Sepsis criteria after ED workup    Patient does not meet Critical Care Time, 0 minutes  CLINICAL IMPRESSIONS     1. Multiple sclerosis exacerbation (HCC)    2. Lumbar pain       SDOH/DISPOSITION/PLAN   Social Determinants affecting Treatment Plan: None    DISPOSITION Admitted 07/10/2025 12:28:23 PM             Admit Note: Pt is being admitted by Hospitalist . The results of their tests and reason(s) for their admission have been discussed with pt and/or available family. They convey agreement and understanding for the need to be admitted and for the admission diagnosis.     PATIENT REFERRED TO:  No follow-up provider specified.      DISCHARGE MEDICATIONS:     Medication List        CONTINUE taking these medications      BD Pen Needle Valerie 2nd Gen 32G X 4 MM Misc  Generic drug: Insulin Pen Needle            ASK your doctor about these medications      Allegra Allergy 180 MG tablet  Generic drug: fexofenadine     ALPRAZolam 1 MG tablet  Commonly known as: XANAX     baclofen 20 MG tablet  Commonly known as: LIORESAL  Take 1 tablet by mouth 3 times daily as needed (muscle spasticity)     Cholecalciferol 50 MCG (2000 UT) Tabs     cyanocobalamin 1000 MCG tablet     estradiol 0.1 MG/GM vaginal cream  Commonly known as: ESTRACE     folic acid 1 MG tablet  Commonly known as: FOLVITE     ibuprofen 600 MG tablet  Commonly known as: ADVIL;MOTRIN  Take 1 tablet by mouth every 6 hours as needed for Pain     insulin lispro (1 Unit Dial) 100 UNIT/ML Sopn  Commonly known as: HUMALOG/ADMELOG     Kesimpta 20 MG/0.4ML Soaj  Generic drug: Ofatumumab  Inject 20 mg into the skin every 30 days

## 2025-07-10 NOTE — PROGRESS NOTES
4 Eyes Skin Assessment     NAME:  Marlene Yun  YOB: 1975  MEDICAL RECORD NUMBER:  [id number]    The patient is being assessed for  Admission    I agree that at least one RN has performed a thorough Head to Toe Skin Assessment on the patient. ALL assessment sites listed below have been assessed.      Areas assessed by both nurses:    Head, Face, Ears, Shoulders, Back, Chest, Arms, Elbows, Hands, Sacrum. Buttock, Coccyx, Ischium, Legs. Feet and Heels, and Under Medical Devices         Does the Patient have a Wound? No noted wound(s)       Mario Prevention initiated by RN: No  Wound Care Orders initiated by RN: No    Pressure Injury (Stage 1,2,3,4, Unstageable, DTI, NWPT, and Complex wounds) if present, place Wound referral order by RN under : No    New Ostomies, if present place, Ostomy referral order under : No     Nurse 1 eSignature: Electronically signed by Ernestina Naranjo RN on 7/10/25 at 5:29 PM EDT    **SHARE this note so that the co-signing nurse can place an eSignature**    Nurse 2 eSignature: {Esignature:[id number]}

## 2025-07-10 NOTE — ED TRIAGE NOTES
Pt ambulatory to triage with rollator, reports having hx of MS and recently started DMT approx 3 weeks ago, pt concerned that she is having an MS flare up. States she has had a headache x the past 2 weeks. Reports generally having R sided weakness however is now having L sided weakness as well onset middle of last week. Reports lower back pain as well, worse with bending movements.     GCS 15.

## 2025-07-10 NOTE — ED NOTES
LDA's:   Peripheral IV 07/10/25 Left Antecubital (Active)     Active Central Lines:                          Active Wounds:    Active Elizabeth's:    Active Feeding Tubes:      Administered Medications:   Medications   sodium chloride flush 0.9 % injection 5-40 mL (has no administration in time range)   sodium chloride flush 0.9 % injection 5-40 mL (has no administration in time range)   0.9 % sodium chloride infusion (has no administration in time range)   potassium chloride (KLOR-CON M) extended release tablet 40 mEq (has no administration in time range)     Or   potassium bicarb-citric acid (EFFER-K) effervescent tablet 40 mEq (has no administration in time range)     Or   potassium chloride 10 mEq/100 mL IVPB (Peripheral Line) (has no administration in time range)   magnesium sulfate 2000 mg in 50 mL IVPB premix (has no administration in time range)   enoxaparin (LOVENOX) injection 40 mg (40 mg SubCUTAneous Not Given 7/10/25 1414)   ondansetron (ZOFRAN-ODT) disintegrating tablet 4 mg (has no administration in time range)     Or   ondansetron (ZOFRAN) injection 4 mg (has no administration in time range)   polyethylene glycol (GLYCOLAX) packet 17 g (has no administration in time range)   acetaminophen (TYLENOL) tablet 650 mg (has no administration in time range)     Or   acetaminophen (TYLENOL) suppository 650 mg (has no administration in time range)   folic acid (FOLVITE) tablet 1 mg (1 mg Oral Given 7/10/25 1413)   insulin lispro (HUMALOG,ADMELOG) injection vial 0-4 Units ( SubCUTAneous Not Given 7/10/25 1417)   glucose chewable tablet 16 g (has no administration in time range)   dextrose bolus 10% 125 mL (has no administration in time range)     Or   dextrose bolus 10% 250 mL (has no administration in time range)   glucagon injection 1 mg (has no administration in time range)   dextrose 10 % infusion (has no administration in time range)   Vitamin D (CHOLECALCIFEROL) tablet 1,000 Units (1,000 Units Oral Given

## 2025-07-10 NOTE — CARE COORDINATION
07/10/25 1457   Service Assessment   Patient Orientation Alert and Oriented   Cognition Alert   History Provided By Patient   Primary Caregiver Self   Accompanied By/Relationship Pt alone.   Support Systems Spouse/Significant Other;Children   Patient's Healthcare Decision Maker is: Legal Next of Kin   PCP Verified by CM Yes  (Mohit Shafer - seen 3 mos ago.)   Last Visit to PCP Within last 3 months   Prior Functional Level Independent in ADLs/IADLs;Assistance with the following:;Mobility  (Cane/rollator/scooter.)   Current Functional Level Independent in ADLs/IADLs;Assistance with the following:;Mobility  (Cane/rollator/scooter.)   Can patient return to prior living arrangement Yes   Ability to make needs known: Good   Family able to assist with home care needs: Yes   Would you like for me to discuss the discharge plan with any other family members/significant others, and if so, who? Yes  ((BF) Richard Lugo)   Financial Resources Medicaid   Community Resources None   CM/SW Referral Other (see comment)  (None)   Social/Functional History   Lives With Significant other   Type of Home House   Home Layout One level;Performs ADL's on one level   Home Access Stairs to enter with rails   Entrance Stairs - Number of Steps 5   Bathroom Shower/Tub Tub/Shower unit   Bathroom Toilet Standard   Bathroom Equipment None   Bathroom Accessibility Accessible   Home Equipment Cane;Rollator;Electric scooter   Receives Help From Family   Prior Level of Assist for ADLs Independent   Prior Level of Assist for Homemaking Independent   Homemaking Responsibilities Yes   Ambulation Assistance Needs assistance  (Cane/rollator/scooter.)   Prior Level of Assist for Transfers Independent   Active  No   Occupation On disability   Discharge Planning   Type of Residence House   Living Arrangements Spouse/Significant Other   Current Services Prior To Admission None   Potential Assistance Needed N/A   DME Ordered? No   Potential Assistance

## 2025-07-11 VITALS
HEART RATE: 72 BPM | RESPIRATION RATE: 16 BRPM | HEIGHT: 67 IN | TEMPERATURE: 99 F | BODY MASS INDEX: 23.86 KG/M2 | DIASTOLIC BLOOD PRESSURE: 77 MMHG | SYSTOLIC BLOOD PRESSURE: 107 MMHG | WEIGHT: 152 LBS | OXYGEN SATURATION: 95 %

## 2025-07-11 PROBLEM — M79.7 FIBROMYALGIA: Chronic | Status: ACTIVE | Noted: 2025-07-11

## 2025-07-11 PROBLEM — Z86.2 H/O LEUKOCYTOSIS: Chronic | Status: ACTIVE | Noted: 2025-07-11

## 2025-07-11 PROBLEM — G81.11 RIGHT SPASTIC HEMIPLEGIA (HCC): Status: ACTIVE | Noted: 2025-07-11

## 2025-07-11 PROBLEM — F41.9 ANXIETY: Chronic | Status: ACTIVE | Noted: 2025-07-11

## 2025-07-11 PROBLEM — E11.9 DIABETES TYPE 2, CONTROLLED (HCC): Chronic | Status: ACTIVE | Noted: 2025-07-11

## 2025-07-11 PROBLEM — R51.9 HEADACHE, UNSPECIFIED HEADACHE TYPE: Status: ACTIVE | Noted: 2025-07-11

## 2025-07-11 PROBLEM — T88.7XXA MEDICATION SIDE EFFECT: Status: ACTIVE | Noted: 2025-07-11

## 2025-07-11 LAB
ANION GAP SERPL CALC-SCNC: 3 MMOL/L (ref 2–12)
BASOPHILS # BLD: 0.08 K/UL (ref 0–0.1)
BASOPHILS NFR BLD: 0.6 % (ref 0–1)
BUN SERPL-MCNC: 14 MG/DL (ref 6–20)
BUN/CREAT SERPL: 15 (ref 12–20)
CA-I BLD-MCNC: 9.5 MG/DL (ref 8.5–10.1)
CHLORIDE SERPL-SCNC: 109 MMOL/L (ref 97–108)
CO2 SERPL-SCNC: 28 MMOL/L (ref 21–32)
CREAT SERPL-MCNC: 0.91 MG/DL (ref 0.55–1.02)
DIFFERENTIAL METHOD BLD: ABNORMAL
EKG ATRIAL RATE: 76 BPM
EKG ATRIAL RATE: 78 BPM
EKG DIAGNOSIS: NORMAL
EKG DIAGNOSIS: NORMAL
EKG P AXIS: 48 DEGREES
EKG P AXIS: 56 DEGREES
EKG P-R INTERVAL: 150 MS
EKG P-R INTERVAL: 172 MS
EKG Q-T INTERVAL: 378 MS
EKG Q-T INTERVAL: 384 MS
EKG QRS DURATION: 128 MS
EKG QRS DURATION: 130 MS
EKG QTC CALCULATION (BAZETT): 425 MS
EKG QTC CALCULATION (BAZETT): 437 MS
EKG R AXIS: 77 DEGREES
EKG R AXIS: 89 DEGREES
EKG T AXIS: 42 DEGREES
EKG T AXIS: 59 DEGREES
EKG VENTRICULAR RATE: 76 BPM
EKG VENTRICULAR RATE: 78 BPM
EOSINOPHIL # BLD: 0.36 K/UL (ref 0–0.4)
EOSINOPHIL NFR BLD: 2.9 % (ref 0–7)
ERYTHROCYTE [DISTWIDTH] IN BLOOD BY AUTOMATED COUNT: 13.1 % (ref 11.5–14.5)
GLUCOSE BLD STRIP.AUTO-MCNC: 104 MG/DL (ref 65–100)
GLUCOSE BLD STRIP.AUTO-MCNC: 159 MG/DL (ref 65–100)
GLUCOSE SERPL-MCNC: 97 MG/DL (ref 65–100)
HCT VFR BLD AUTO: 40.7 % (ref 35–47)
HGB BLD-MCNC: 13.6 G/DL (ref 11.5–16)
IMM GRANULOCYTES # BLD AUTO: 0.06 K/UL (ref 0–0.04)
IMM GRANULOCYTES NFR BLD AUTO: 0.5 % (ref 0–0.5)
LYMPHOCYTES # BLD: 4.57 K/UL (ref 0.8–3.5)
LYMPHOCYTES NFR BLD: 36.5 % (ref 12–49)
MAGNESIUM SERPL-MCNC: 2 MG/DL (ref 1.6–2.4)
MCH RBC QN AUTO: 29.1 PG (ref 26–34)
MCHC RBC AUTO-ENTMCNC: 33.4 G/DL (ref 30–36.5)
MCV RBC AUTO: 87 FL (ref 80–99)
MONOCYTES # BLD: 0.96 K/UL (ref 0–1)
MONOCYTES NFR BLD: 7.7 % (ref 5–13)
NEUTS SEG # BLD: 6.48 K/UL (ref 1.8–8)
NEUTS SEG NFR BLD: 51.8 % (ref 32–75)
NRBC # BLD: 0 K/UL (ref 0–0.01)
NRBC BLD-RTO: 0 PER 100 WBC
PERFORMED BY:: ABNORMAL
PERFORMED BY:: ABNORMAL
PHOSPHATE SERPL-MCNC: 4.6 MG/DL (ref 2.6–4.7)
PLATELET # BLD AUTO: 281 K/UL (ref 150–400)
PMV BLD AUTO: 11.5 FL (ref 8.9–12.9)
POTASSIUM SERPL-SCNC: 4.6 MMOL/L (ref 3.5–5.1)
RBC # BLD AUTO: 4.68 M/UL (ref 3.8–5.2)
SODIUM SERPL-SCNC: 140 MMOL/L (ref 136–145)
TROPONIN I SERPL HS-MCNC: <4 NG/L (ref 0–51)
TROPONIN I SERPL HS-MCNC: <4 NG/L (ref 0–51)
WBC # BLD AUTO: 12.5 K/UL (ref 3.6–11)

## 2025-07-11 PROCEDURE — 84100 ASSAY OF PHOSPHORUS: CPT

## 2025-07-11 PROCEDURE — 82962 GLUCOSE BLOOD TEST: CPT

## 2025-07-11 PROCEDURE — 6370000000 HC RX 637 (ALT 250 FOR IP): Performed by: NURSE PRACTITIONER

## 2025-07-11 PROCEDURE — 85025 COMPLETE CBC W/AUTO DIFF WBC: CPT

## 2025-07-11 PROCEDURE — 6360000002 HC RX W HCPCS

## 2025-07-11 PROCEDURE — 6370000000 HC RX 637 (ALT 250 FOR IP)

## 2025-07-11 PROCEDURE — 2500000003 HC RX 250 WO HCPCS

## 2025-07-11 PROCEDURE — 80048 BASIC METABOLIC PNL TOTAL CA: CPT

## 2025-07-11 PROCEDURE — 83735 ASSAY OF MAGNESIUM: CPT

## 2025-07-11 PROCEDURE — 36415 COLL VENOUS BLD VENIPUNCTURE: CPT

## 2025-07-11 RX ORDER — VITAMIN B COMPLEX
1000 TABLET ORAL DAILY
Qty: 30 TABLET | Refills: 1 | Status: SHIPPED | OUTPATIENT
Start: 2025-07-12 | End: 2025-09-10

## 2025-07-11 RX ORDER — IBUPROFEN 800 MG/1
800 TABLET, FILM COATED ORAL EVERY 8 HOURS PRN
Status: DISCONTINUED | OUTPATIENT
Start: 2025-07-11 | End: 2025-07-11 | Stop reason: HOSPADM

## 2025-07-11 RX ADMIN — ENOXAPARIN SODIUM 40 MG: 100 INJECTION SUBCUTANEOUS at 08:04

## 2025-07-11 RX ADMIN — BACLOFEN 20 MG: 10 TABLET ORAL at 13:13

## 2025-07-11 RX ADMIN — BACLOFEN 20 MG: 10 TABLET ORAL at 08:05

## 2025-07-11 RX ADMIN — SODIUM CHLORIDE, PRESERVATIVE FREE 10 ML: 5 INJECTION INTRAVENOUS at 08:07

## 2025-07-11 RX ADMIN — IBUPROFEN 800 MG: 800 TABLET, FILM COATED ORAL at 13:13

## 2025-07-11 RX ADMIN — Medication 1000 UNITS: at 08:04

## 2025-07-11 RX ADMIN — CYANOCOBALAMIN TAB 1000 MCG 1000 MCG: 1000 TAB at 08:05

## 2025-07-11 RX ADMIN — FOLIC ACID 1 MG: 1 TABLET ORAL at 08:04

## 2025-07-11 ASSESSMENT — PAIN SCALES - WONG BAKER: WONGBAKER_NUMERICALRESPONSE: NO HURT

## 2025-07-11 ASSESSMENT — ENCOUNTER SYMPTOMS
SHORTNESS OF BREATH: 0
VOMITING: 0
CHEST TIGHTNESS: 0
NAUSEA: 0

## 2025-07-11 ASSESSMENT — PAIN SCALES - GENERAL
PAINLEVEL_OUTOF10: 7
PAINLEVEL_OUTOF10: 0

## 2025-07-11 ASSESSMENT — PAIN DESCRIPTION - LOCATION: LOCATION: HEAD

## 2025-07-11 ASSESSMENT — PAIN DESCRIPTION - DESCRIPTORS: DESCRIPTORS: ACHING

## 2025-07-11 NOTE — CONSULTS
Name: Marlene Yun  : 1975  MRN: [id number]  CSN: [id number]    Consult Date:  2025  Referring Physician:  Izaiah Dixon*           [facility] TELE-NEUROLOGY CONSULTATION      IMPRESSION:    Weakness: Although this is not listed as a common side effect in the medication literature, it has been discussed among patients taking this medication that after the dose pre-existing symptoms can worsen.  It is likely that with a month between doses she is going to have minimal symptoms though it will be her experience and preferences that determine whether or not there is symptoms are worth the potential benefits.  Headache: Documented side effect of her medication  Multiple sclerosis: Stable    RECOMMENDATIONS:    The patient should return to her monthly injections of Kesimpta  The patient will discuss with her multiple sclerosis specialist whether she is having excessive side effects and needs a new medication  If the patient has any new neurologic symptoms or if her chronic symptoms worsen to the point of disability she should return immediately.    My impressions and recommendations were discussed with the patient.  I answered questions to the best of my ability until they expressed satisfaction.    There are no further neurological recommendations at this time.  If there are any changes in clinical history or examination please do not hesitate to call the teleneurology consultation service for routine reconsultation.  Thank you for allowing me to participate in the care of this patient.      ========================================  Reason for Consult  I have been asked to see this patient in neurological consultation to render advice and opinion regarding multiple sclerosis  This teleneurology consultation was undertaken with the patient located in [place] and the provider located in [place].      History of Present Illness  I reviewed the medical records. I have spoken with the patient

## 2025-07-11 NOTE — SIGNIFICANT EVENT
Chest pain:  Patient rates her chest pain 8/10 waxing and waning.  Described as substernal.  Chest x-ray revealed no acute process.  EKG without ischemic changes.  ANALI score of 0.  Troponin normal x 2.  Low yield for ischemic chest pain and will defer cardiac consultation for now.    Pt's ANALI Score =     ANALI Score Calculation (1 point for each):  - Age >= 65  - Aspirin use in the last 7 days   - At least 2 angina episodes within the last 24hrs  - ST changes of at least 0.5mm on admission EKG  - Elevated serum cardiac biomarkers  - Known Coronary Artery Disease (CAD) (coronary stenosis >= 50%)  - At least 3 risk factors for CAD:  Hypertension -> 140/90 or on antihypertensives, Cigarette smoking, HDL < 40, Diabetes, Family history of premature CAD (CAD in male first-degree relative, or father less than 55, or female first-degree relative or mother less than 65).    Score Interpretation:  % risk at 14 days of: all-cause mortality, new or recurrent MI, or severe recurrent ischemia requiring urgent revascularization.  Score of 0-1 = 4.7% risk  Score of 2 = 8.3% risk  Score of 3 = 13.2% risk  Score of 4 = 19.9% risk  Score of 5 = 26.2% risk  Score of 6-7 = at least 40.9% risk    Two or More Episodes of Severe Angina within 24 Hours: 0  Elevated Cardiac Markers: 0  ST Changes > 0.5 mm: 0  Aspirin Use in the Past 7 Days: 0  Age 65+: 0  3+ CAD Risk Factors: 0  CAD Stenosis >= 50%: 0  ANALI Risk Score (Calculated): 0

## 2025-07-11 NOTE — PROGRESS NOTES
Hospitalist Progress Note    NAME:   Marlene Yun   : 1975   MRN: [id number]     Date/Time: 2025 10:35 AM  Patient PCP: Mohit Shafer DO    Estimated discharge date: 24 hours  Barriers: Clinical improvement    Hospital course:  Marlene Yun is a 49 y.o. female with multiple medical problems including multiple sclerosis, likely, fibromyalgia, diabetes mellitus, anxiety, persistent leukocytosis with new onset left-sided weakness.  Per patient she is ambulatory with rollator.  She has history of MS and.  For the past 2 weeks she has been experiencing headache, for past few days left-sided weakness and lower back pain worse with bending.  Initially she experiences right-sided weakness.  In the ED patient's vital signs were stable.  Lab workup significant for persistent leukocytosis.  CT head unremarkable for acute pathology but revealed old concern for demyelination as previously seen on MR .  Teleneurology consulted, concern for acute MS flare, MRI head and C-spine with and without contrast ordered.       Assessment / Plan:  New onset left-sided weakness concern for multiple sclerosis flare  History of multiple sclerosis on immunotherapy  Patient generally experiences right-sided weakness, new onset left-sided weakness for past few days  CT head unremarkable for acute intracranial pathology.  Revealed small low-density in left frontal lobe white matter inferior to the left frontal horn and left parietal periventricular white matter which may represent small foci of demyelination, present on MR 2024  Teleneurology on board, advised admission for MS flare will follow recommendations  MRI brain and C-spine with and without contrast showed no acute intracranial mass, hemorrhage  or evidence of acute infarction   Follow-up blood culture   pending    CRP  <0.29 Pro roro <0.05  Most Home medications are in relation to multiple sclerosis, resumed  On baclofen for muscle spasms     Chronic

## 2025-07-11 NOTE — DISCHARGE SUMMARY
Discharge Summary    Name: Marlene Yun  [id number]  YOB: 1975 (Age: 49 y.o.)   Date of Admission: 7/10/2025  Date of Discharge: 7/11/2025  Attending Physician: Izaiah Dixon*    Discharge Diagnosis:   Principal Problem:    Multiple sclerosis exacerbation (HCC)  Active Problems:    Multiple sclerosis (HCC)    Diabetes type 2, controlled (HCC)    Anxiety    Fibromyalgia    H/O leukocytosis    Right spastic hemiplegia (HCC)    Headache, unspecified headache type    Medication side effect  Resolved Problems:    * No resolved hospital problems. *       Consultations:  IP CONSULT TO TELE-NEUROLOGY      Brief Admission History/Reason for Admission Per Ashlie Rico MD:   Marlene Yun is a 49 y.o. female with multiple medical problems including multiple sclerosis, likely, fibromyalgia, diabetes mellitus, anxiety, persistent leukocytosis with new onset left-sided weakness.  Per patient she is ambulatory with rollator.  She has history of MS and.  For the past 2 weeks she has been experiencing headache, for past few days left-sided weakness and lower back pain worse with bending.  Initially she experiences right-sided weakness.  In the ED patient's vital signs were stable.  Lab workup significant for persistent leukocytosis.  CT head unremarkable for acute pathology but revealed old concern for demyelination as previously seen on MR 2024.  Teleneurology consulted, concern for acute MS flare, MRI head and C-spine with and without contrast ordered.    Brief Hospital Course by Main Problems:   New onset left-sided weakness concern for multiple sclerosis flare  History of multiple sclerosis on immunotherapy  Patient generally experiences right-sided weakness, new onset left-sided weakness for past few days  CT head unremarkable for acute intracranial pathology.  Revealed small low-density in left frontal lobe white matter inferior to the left frontal horn and

## 2025-07-11 NOTE — PROGRESS NOTES
2302 patient complaining on and off chest pain, scale of 6-8. VS taken. Jaz Wyatt made aware, troponin, xray and EKG ordered.    EKG  showed: NSR right bundle branch block. Jaz Wyatt made aware.    2320 patient requested pillow, went to the bathroom and verbalized pain is subsiding. Will continue to monitor.    0100 trops <4, Jaz Wyatt made aware.

## 2025-07-13 LAB
BACTERIA SPEC CULT: NORMAL
BACTERIA SPEC CULT: NORMAL
Lab: NORMAL
Lab: NORMAL

## 2025-07-16 LAB
BACTERIA SPEC CULT: NORMAL
BACTERIA SPEC CULT: NORMAL
Lab: NORMAL
Lab: NORMAL

## 2025-07-24 ENCOUNTER — TELEPHONE (OUTPATIENT)
Age: 50
End: 2025-07-24

## 2025-07-24 NOTE — TELEPHONE ENCOUNTER
Spoke with pt and she stated her blood sugar was 89 the other day and the highest her blood sugar has been after eating is 159. Pt stated she had her A1C done by her PCP on Monday 7/21/25. A1C was 6.1 and lab has been uploaded. Pt is concerned that the [employer] is affecting her diabetes.

## 2025-07-24 NOTE — TELEPHONE ENCOUNTER
Patient called in and stated she went to hospital because her A1C went up again she thinks it is due to her new meds from provider she said the Trulicity is not responding and she has not had any new changes in her eating appetite she is actually losing weight. She would like a nurse to roor her back if possible

## 2025-07-24 NOTE — TELEPHONE ENCOUNTER
Spoke with pt to inform her of provider's message pt is still very concerned about her A1c and blood sugar levels are still elevated and that she has already informed her pcp and [employer] company about her concerns

## 2025-07-25 ENCOUNTER — TELEPHONE (OUTPATIENT)
Age: 50
End: 2025-07-25

## 2025-07-29 ENCOUNTER — TELEPHONE (OUTPATIENT)
Age: 50
End: 2025-07-29

## 2025-07-30 ENCOUNTER — TELEPHONE (OUTPATIENT)
Age: 50
End: 2025-07-30

## 2025-07-30 DIAGNOSIS — G35 RELAPSING REMITTING MULTIPLE SCLEROSIS (HCC): Primary | ICD-10-CM

## 2025-07-30 NOTE — TELEPHONE ENCOUNTER
Desirae London with [employer] called, and would like you to call her back if possible.  She is sad to hear you're leaving.  She wants to follow up with this patient's medication.  I didn't give any information out as I don't have her listed on the [facility].      I do see patient seems to be getting care going forward [facility].  I will let you decide if you want to disclose that info. To the [employer] rep.  She stated she has tried to reach the patient for 30 days.  Thanks.

## (undated) DEVICE — ESOPHAGEAL BALLOON DILATATION CATHETER: Brand: CRE FIXED WIRE

## (undated) DEVICE — CANISTER, RIGID, 3000CC: Brand: MEDLINE INDUSTRIES, INC.

## (undated) DEVICE — SNARE ENDOSCP DIA9MM SHTH DIA2.4MM CLD FOR POLYP EXACTO

## (undated) DEVICE — TRAY PREP DRY W/ PREM GLV 2 APPL 6 SPNG 2 UNDPD 1 OVERWRAP

## (undated) DEVICE — CATHETER,URETHRAL,REDRUBBER,STRL,16FR: Brand: MEDLINE

## (undated) DEVICE — SET ADMIN 16ML TBNG L100IN 2 Y INJ SITE IV PIGGY BK DISP (ORDER IN MULIPLES OF 48)

## (undated) DEVICE — CATHETER IV 22GA L1IN OD0.8382-0.9144MM ID0.6096-0.6858MM 382523

## (undated) DEVICE — GLOVE SURG SZ 8 L12IN FNGR THK79MIL GRN LTX FREE

## (undated) DEVICE — SUTURE MCRYL SZ 4-0 L27IN ABSRB UD L19MM PS-2 1/2 CIR PRIM Y426H

## (undated) DEVICE — SOLUTION IRRIG 500ML 0.9% SOD CHL USP POUR PLAS BTL

## (undated) DEVICE — SOUTHSIDE TURNOVER: Brand: MEDLINE INDUSTRIES, INC.

## (undated) DEVICE — DRAPE,REIN 53X77,STERILE: Brand: MEDLINE

## (undated) DEVICE — BASIC SINGLE BASIN-LF: Brand: MEDLINE INDUSTRIES, INC.

## (undated) DEVICE — SYRINGE INFL 60ML DISP ALLIANCE II

## (undated) DEVICE — CANNULA CUSH AD W/ 14FT TBG

## (undated) DEVICE — GLOVE SURG SZ 65 THK91MIL LTX FREE SYN POLYISOPRENE

## (undated) DEVICE — T-DRAPE,EXTREMITY, ULTRAGARD: Brand: MEDLINE

## (undated) DEVICE — INSTRMT SET WND CLSR SUT PASS --

## (undated) DEVICE — BLUNTFILL WITH FILTER: Brand: MONOJECT

## (undated) DEVICE — PAD POSITIONING WNG STD KIT W/BODY STRP LF DISP

## (undated) DEVICE — 3M™ CUROS™ DISINFECTING CAP FOR NEEDLELESS CONNECTORS 270/CARTON 20 CARTONS/CASE CFF1-270: Brand: CUROS™

## (undated) DEVICE — BLUNTFILL: Brand: MONOJECT

## (undated) DEVICE — PREP PAD BNS: Brand: CONVERTORS

## (undated) DEVICE — TISSUE RETRIEVAL SYSTEM: Brand: INZII RETRIEVAL SYSTEM

## (undated) DEVICE — INSUFFLATION NEEDLE: Brand: SURGINEEDLE

## (undated) DEVICE — IV STRT KT 3282] LSL INDUSTRIES INC]

## (undated) DEVICE — BITEBLOCK ENDOSCP 60FR MAXI WHT POLYETH STURDY W/ VELC WVN

## (undated) DEVICE — SUTURE SZ 0 27IN 5/8 CIR UR-6  TAPER PT VIOLET ABSRB VICRYL J603H

## (undated) DEVICE — LAPAROSCOPIC TROCAR SLEEVE/SINGLE USE: Brand: KII® OPTICAL ACCESS SYSTEM

## (undated) DEVICE — SOLIDIFIER FLD 2OZ 1500CC N DISINF IN BTL DISP SAFESORB

## (undated) DEVICE — DERMABOND SKIN ADH 0.7ML -- DERMABOND ADVANCED 12/BX

## (undated) DEVICE — SHEARS ENDOSCP L36CM DIA5MM ULTRASONIC CRV TIP W/ ADV

## (undated) DEVICE — ELECTRODE,RADIOTRANSLUCENT,FOAM,3PK: Brand: MEDLINE

## (undated) DEVICE — SPONGE GZ W4XL4IN COT 12 PLY TYP VII WVN C FLD DSGN

## (undated) DEVICE — GLOVE ORANGE PI 7 1/2   MSG9075

## (undated) DEVICE — POWDER HEMOSTAT GEL 3.0GR -- SURGICEL

## (undated) DEVICE — COVER LT HNDL BLU PLAS

## (undated) DEVICE — TROCAR: Brand: KII® SLEEVE

## (undated) DEVICE — SOL INJ SOD CL 0.9% 250ML BG --

## (undated) DEVICE — SYRINGE MED 5ML STD CLR PLAS LUERLOCK TIP N CTRL DISP

## (undated) DEVICE — 1200 GUARD II KIT W/5MM TUBE W/O VAC TUBE: Brand: GUARDIAN

## (undated) DEVICE — SYRINGE MEDICAL 3ML CLEAR PLASTIC STANDARD NON CONTROL LUERLOCK TIP DISPOSABLE

## (undated) DEVICE — SOLUTION IRRIG 1000ML 0.9% SOD CHL USP POUR PLAS BTL

## (undated) DEVICE — BLADE ASSEMB CLP HAIR FINE --

## (undated) DEVICE — KIT COLON W/ 1.1OZ LUB AND 2 END

## (undated) DEVICE — GLOVE SURG SZ 65 L12IN FNGR THK79MIL GRN LTX FREE

## (undated) DEVICE — TROCAR: Brand: KII FIOS FIRST ENTRY

## (undated) DEVICE — SET GRAV CK VLV NEEDLESS ST 3 GANGED 4WAY STPCOCK HI FLO 10

## (undated) DEVICE — GYN LAPAROSCOPY-SFMC: Brand: MEDLINE INDUSTRIES, INC.

## (undated) DEVICE — APPLICATOR SEAL FLOSEAL 5MM+ --

## (undated) DEVICE — CONTAINER SPEC 20 ML LID NEUT BUFF FORMALIN 10 % POLYPR STS

## (undated) DEVICE — MINOR EXTREMITY PACK: Brand: MEDLINE INDUSTRIES, INC.

## (undated) DEVICE — EVAC SMOKE SEECLEAR XCL -- SEE CLEAR